# Patient Record
Sex: FEMALE | Race: WHITE | NOT HISPANIC OR LATINO | Employment: OTHER | URBAN - METROPOLITAN AREA
[De-identification: names, ages, dates, MRNs, and addresses within clinical notes are randomized per-mention and may not be internally consistent; named-entity substitution may affect disease eponyms.]

---

## 2023-02-24 LAB
ANION GAP IN SER/PLAS: 16 MMOL/L (ref 10–20)
CALCIUM (MG/DL) IN SER/PLAS: 9.4 MG/DL (ref 8.6–10.3)
CARBON DIOXIDE, TOTAL (MMOL/L) IN SER/PLAS: 27 MMOL/L (ref 21–32)
CHLORIDE (MMOL/L) IN SER/PLAS: 98 MMOL/L (ref 98–107)
CREATININE (MG/DL) IN SER/PLAS: 1.31 MG/DL (ref 0.5–1.05)
GFR FEMALE: 41 ML/MIN/1.73M2
GLUCOSE (MG/DL) IN SER/PLAS: 283 MG/DL (ref 74–99)
POTASSIUM (MMOL/L) IN SER/PLAS: 4.4 MMOL/L (ref 3.5–5.3)
SODIUM (MMOL/L) IN SER/PLAS: 137 MMOL/L (ref 136–145)
THYROTROPIN (MIU/L) IN SER/PLAS BY DETECTION LIMIT <= 0.05 MIU/L: 0.96 MIU/L (ref 0.44–3.98)
UREA NITROGEN (MG/DL) IN SER/PLAS: 24 MG/DL (ref 6–23)

## 2023-02-25 LAB
ESTIMATED AVERAGE GLUCOSE FOR HBA1C: 203 MG/DL
HEMOGLOBIN A1C/HEMOGLOBIN TOTAL IN BLOOD: 8.7 %

## 2023-02-28 LAB
THYROGLOBULIN AB (IU/ML) IN SER/PLAS: <0.9 IU/ML (ref 0–4)
THYROGLOBULIN LC-MS/MS: ABNORMAL NG/ML (ref 1.3–31.8)
THYROGLOBULIN: 0.2 NG/ML (ref 1.3–31.8)

## 2023-05-30 LAB
ANION GAP IN SER/PLAS: 16 MMOL/L (ref 10–20)
CALCIUM (MG/DL) IN SER/PLAS: 9.7 MG/DL (ref 8.6–10.3)
CARBON DIOXIDE, TOTAL (MMOL/L) IN SER/PLAS: 26 MMOL/L (ref 21–32)
CHLORIDE (MMOL/L) IN SER/PLAS: 101 MMOL/L (ref 98–107)
CHOLESTEROL (MG/DL) IN SER/PLAS: 134 MG/DL (ref 0–199)
CHOLESTEROL IN HDL (MG/DL) IN SER/PLAS: 46.1 MG/DL
CHOLESTEROL/HDL RATIO: 2.9
CREATININE (MG/DL) IN SER/PLAS: 1.27 MG/DL (ref 0.5–1.05)
ESTIMATED AVERAGE GLUCOSE FOR HBA1C: 160 MG/DL
GFR FEMALE: 42 ML/MIN/1.73M2
GLUCOSE (MG/DL) IN SER/PLAS: 188 MG/DL (ref 74–99)
HEMOGLOBIN A1C/HEMOGLOBIN TOTAL IN BLOOD: 7.2 %
LDL: 65 MG/DL (ref 0–99)
POTASSIUM (MMOL/L) IN SER/PLAS: 4.7 MMOL/L (ref 3.5–5.3)
SEDIMENTATION RATE, ERYTHROCYTE: 6 MM/H (ref 0–30)
SODIUM (MMOL/L) IN SER/PLAS: 138 MMOL/L (ref 136–145)
TRIGLYCERIDE (MG/DL) IN SER/PLAS: 116 MG/DL (ref 0–149)
UREA NITROGEN (MG/DL) IN SER/PLAS: 27 MG/DL (ref 6–23)
VLDL: 23 MG/DL (ref 0–40)

## 2023-05-31 ENCOUNTER — APPOINTMENT (OUTPATIENT)
Dept: PRIMARY CARE | Facility: CLINIC | Age: 83
End: 2023-05-31
Payer: MEDICARE

## 2023-06-12 LAB — URATE (MG/DL) IN SER/PLAS: 8.8 MG/DL (ref 2.3–6.7)

## 2023-06-21 ENCOUNTER — OFFICE VISIT (OUTPATIENT)
Dept: PRIMARY CARE | Facility: CLINIC | Age: 83
End: 2023-06-21
Payer: MEDICARE

## 2023-06-21 VITALS
HEIGHT: 68 IN | WEIGHT: 272 LBS | HEART RATE: 98 BPM | BODY MASS INDEX: 41.22 KG/M2 | RESPIRATION RATE: 16 BRPM | DIASTOLIC BLOOD PRESSURE: 68 MMHG | SYSTOLIC BLOOD PRESSURE: 124 MMHG | OXYGEN SATURATION: 96 %

## 2023-06-21 DIAGNOSIS — E78.5 HYPERLIPIDEMIA, UNSPECIFIED HYPERLIPIDEMIA TYPE: ICD-10-CM

## 2023-06-21 DIAGNOSIS — E11.9 DIABETES MELLITUS TYPE 2 WITHOUT RETINOPATHY (MULTI): ICD-10-CM

## 2023-06-21 DIAGNOSIS — E79.0 HYPERURICEMIA: ICD-10-CM

## 2023-06-21 DIAGNOSIS — I10 PRIMARY HYPERTENSION: Primary | ICD-10-CM

## 2023-06-21 DIAGNOSIS — I48.19 PERSISTENT ATRIAL FIBRILLATION (MULTI): ICD-10-CM

## 2023-06-21 DIAGNOSIS — E03.9 ACQUIRED HYPOTHYROIDISM: ICD-10-CM

## 2023-06-21 PROBLEM — H02.59 FLOPPY EYELID SYNDROME OF BOTH EYES: Status: ACTIVE | Noted: 2023-06-21

## 2023-06-21 PROBLEM — C73 THYROID CANCER (MULTI): Status: ACTIVE | Noted: 2023-06-21

## 2023-06-21 PROBLEM — K64.4 HEMORRHOIDS, EXTERNAL: Status: ACTIVE | Noted: 2023-06-21

## 2023-06-21 PROBLEM — R09.81 NASAL CONGESTION: Status: ACTIVE | Noted: 2023-06-21

## 2023-06-21 PROBLEM — H52.31 ANISOMETROPIA: Status: ACTIVE | Noted: 2023-06-21

## 2023-06-21 PROBLEM — K62.5 BLEEDING PER RECTUM: Status: ACTIVE | Noted: 2023-06-21

## 2023-06-21 PROBLEM — R06.09 DOE (DYSPNEA ON EXERTION): Status: ACTIVE | Noted: 2023-06-21

## 2023-06-21 PROBLEM — R11.0 DAILY NAUSEA: Status: ACTIVE | Noted: 2023-06-21

## 2023-06-21 PROBLEM — R14.0 ABDOMINAL BLOATING: Status: ACTIVE | Noted: 2023-06-21

## 2023-06-21 PROBLEM — G47.33 SLEEP APNEA, OBSTRUCTIVE: Status: ACTIVE | Noted: 2023-06-21

## 2023-06-21 PROBLEM — R53.1 WEAKNESS GENERALIZED: Status: ACTIVE | Noted: 2023-06-21

## 2023-06-21 PROBLEM — J44.9 CHRONIC OBSTRUCTIVE PULMONARY DISEASE (MULTI): Status: ACTIVE | Noted: 2023-06-21

## 2023-06-21 PROBLEM — H20.9 ANTERIOR UVEITIS: Status: ACTIVE | Noted: 2023-06-21

## 2023-06-21 PROBLEM — R60.9 EDEMA: Status: ACTIVE | Noted: 2023-06-21

## 2023-06-21 PROBLEM — I50.30 (HFPEF) HEART FAILURE WITH PRESERVED EJECTION FRACTION (MULTI): Status: ACTIVE | Noted: 2023-06-21

## 2023-06-21 PROBLEM — I48.91 ATRIAL FIBRILLATION (MULTI): Status: ACTIVE | Noted: 2023-06-21

## 2023-06-21 PROBLEM — K52.9 CHRONIC DIARRHEA OF UNKNOWN ORIGIN: Status: ACTIVE | Noted: 2023-06-21

## 2023-06-21 PROBLEM — H26.9 CATARACT OF RIGHT EYE: Status: ACTIVE | Noted: 2023-06-21

## 2023-06-21 PROBLEM — E66.01 SEVERE OBESITY (BMI >= 40) (MULTI): Status: ACTIVE | Noted: 2023-06-21

## 2023-06-21 PROBLEM — M25.569 JOINT PAIN, KNEE: Status: ACTIVE | Noted: 2023-06-21

## 2023-06-21 PROBLEM — R53.83 FATIGUE: Status: ACTIVE | Noted: 2023-06-21

## 2023-06-21 PROBLEM — I87.399 CHRONIC VENOUS HYPERTENSION WITH COMPLICATION: Status: ACTIVE | Noted: 2023-06-21

## 2023-06-21 PROBLEM — M19.90 ARTHRITIS: Status: ACTIVE | Noted: 2023-06-21

## 2023-06-21 PROBLEM — H18.20 CORNEAL EDEMA OF RIGHT EYE: Status: ACTIVE | Noted: 2023-06-21

## 2023-06-21 PROBLEM — R10.84 GENERALIZED ABDOMINAL PAIN: Status: ACTIVE | Noted: 2023-06-21

## 2023-06-21 PROBLEM — Z96.1 PSEUDOPHAKIA OF BOTH EYES: Status: ACTIVE | Noted: 2023-06-21

## 2023-06-21 PROBLEM — H57.04 PERSISTENT MYDRIASIS: Status: ACTIVE | Noted: 2023-06-21

## 2023-06-21 PROBLEM — R19.8 ALTERNATING CONSTIPATION AND DIARRHEA: Status: ACTIVE | Noted: 2023-06-21

## 2023-06-21 PROBLEM — K64.8 PROLAPSED INTERNAL HEMORRHOIDS: Status: ACTIVE | Noted: 2023-06-21

## 2023-06-21 PROCEDURE — 1159F MED LIST DOCD IN RCRD: CPT | Performed by: INTERNAL MEDICINE

## 2023-06-21 PROCEDURE — 3074F SYST BP LT 130 MM HG: CPT | Performed by: INTERNAL MEDICINE

## 2023-06-21 PROCEDURE — 99213 OFFICE O/P EST LOW 20 MIN: CPT | Performed by: INTERNAL MEDICINE

## 2023-06-21 PROCEDURE — 1036F TOBACCO NON-USER: CPT | Performed by: INTERNAL MEDICINE

## 2023-06-21 PROCEDURE — 3078F DIAST BP <80 MM HG: CPT | Performed by: INTERNAL MEDICINE

## 2023-06-21 RX ORDER — METFORMIN HYDROCHLORIDE 500 MG/1
3 TABLET, EXTENDED RELEASE ORAL DAILY
COMMUNITY
Start: 2013-06-03 | End: 2024-05-28 | Stop reason: SDUPTHER

## 2023-06-21 RX ORDER — BLOOD-GLUCOSE METER
EACH MISCELLANEOUS
COMMUNITY
Start: 2021-03-24 | End: 2024-05-28 | Stop reason: SDUPTHER

## 2023-06-21 RX ORDER — GLIMEPIRIDE 1 MG/1
1 TABLET ORAL DAILY
COMMUNITY
End: 2024-04-09 | Stop reason: SDUPTHER

## 2023-06-21 RX ORDER — SIMVASTATIN 40 MG/1
1 TABLET, FILM COATED ORAL DAILY
COMMUNITY
Start: 2012-10-08 | End: 2023-06-21 | Stop reason: SDUPTHER

## 2023-06-21 RX ORDER — SIMVASTATIN 40 MG/1
40 TABLET, FILM COATED ORAL DAILY
Qty: 90 TABLET | Refills: 3 | Status: SHIPPED | OUTPATIENT
Start: 2023-06-21

## 2023-06-21 RX ORDER — TORSEMIDE 20 MG/1
10 TABLET ORAL DAILY
COMMUNITY
End: 2024-04-30

## 2023-06-21 RX ORDER — LISINOPRIL 20 MG/1
1 TABLET ORAL DAILY
COMMUNITY

## 2023-06-21 RX ORDER — LEVOTHYROXINE SODIUM 150 UG/1
150 TABLET ORAL DAILY
COMMUNITY
Start: 2013-01-31 | End: 2023-09-19 | Stop reason: SDUPTHER

## 2023-06-21 RX ORDER — DICLOFENAC SODIUM 10 MG/G
GEL TOPICAL
COMMUNITY
Start: 2022-09-03 | End: 2023-08-01 | Stop reason: ALTCHOICE

## 2023-06-21 RX ORDER — ALLOPURINOL 100 MG/1
100 TABLET ORAL 2 TIMES DAILY
COMMUNITY
Start: 2023-06-16 | End: 2023-08-01 | Stop reason: ALTCHOICE

## 2023-06-21 RX ORDER — CITALOPRAM 20 MG/1
1 TABLET, FILM COATED ORAL DAILY
COMMUNITY
Start: 2018-03-14 | End: 2023-12-19 | Stop reason: SDUPTHER

## 2023-06-21 RX ORDER — VERAPAMIL HYDROCHLORIDE 240 MG/1
1 TABLET, FILM COATED, EXTENDED RELEASE ORAL DAILY
COMMUNITY
Start: 2012-07-09

## 2023-06-21 RX ORDER — CELECOXIB 200 MG/1
1 CAPSULE ORAL DAILY
COMMUNITY
Start: 2014-11-03 | End: 2023-10-03 | Stop reason: SDUPTHER

## 2023-06-21 RX ORDER — METOPROLOL SUCCINATE 50 MG/1
50 TABLET, EXTENDED RELEASE ORAL DAILY
COMMUNITY
Start: 2013-01-15

## 2023-06-21 RX ORDER — HYDRALAZINE HYDROCHLORIDE 50 MG/1
50 TABLET, FILM COATED ORAL 2 TIMES DAILY
COMMUNITY
End: 2023-06-21 | Stop reason: SDUPTHER

## 2023-06-21 RX ORDER — EXENATIDE 2 MG/.85ML
INJECTION, SUSPENSION, EXTENDED RELEASE SUBCUTANEOUS
COMMUNITY
Start: 2019-10-14 | End: 2023-10-17 | Stop reason: SDUPTHER

## 2023-06-21 RX ORDER — WARFARIN SODIUM 5 MG/1
TABLET ORAL
COMMUNITY
Start: 2019-04-06

## 2023-06-21 RX ORDER — PSYLLIUM SEED
PACKET (EA) ORAL
COMMUNITY

## 2023-06-21 RX ORDER — HYDRALAZINE HYDROCHLORIDE 50 MG/1
50 TABLET, FILM COATED ORAL 2 TIMES DAILY
Qty: 180 TABLET | Refills: 3 | Status: SHIPPED | OUTPATIENT
Start: 2023-06-21

## 2023-06-21 ASSESSMENT — ENCOUNTER SYMPTOMS
VOMITING: 0
CONSTIPATION: 0
NAUSEA: 0
DIARRHEA: 0
MYALGIAS: 0
CHILLS: 0
COUGH: 0
SHORTNESS OF BREATH: 0
DIAPHORESIS: 0
JOINT SWELLING: 0
FEVER: 0

## 2023-06-21 ASSESSMENT — PATIENT HEALTH QUESTIONNAIRE - PHQ9
2. FEELING DOWN, DEPRESSED OR HOPELESS: NOT AT ALL
1. LITTLE INTEREST OR PLEASURE IN DOING THINGS: NOT AT ALL
SUM OF ALL RESPONSES TO PHQ9 QUESTIONS 1 AND 2: 0

## 2023-06-21 NOTE — PATIENT INSTRUCTIONS
REFILLS ARE SENT IN FOR YOU    2.  PLEASE ASK CONGESTIVE HEART FAILURE DOCTOR WHY YOU STOPPED LISINOPRIL, SINCE THIS CAN HELP CONGESTIVE HEART, HELP SLOW KIDNEY ISSUES, AND PREVENT STROKES.    3.  LABS ARE ORDERED AND IN THE COMPUTER FOR BEFORE NEXT VISIT    4.  PLEASE CALL IF OTHER REFILLS ARE NEEDED    5.  I SUSPECT THAT YOUR TOE PAIN IS FROM HAMMERTOE MORE SO THAN GOUT, BUT WE WILL SEE FROM THE INTRA-OPERATIVE TEST WHETHER THERE IS GOUT IN THERE    6.  PLEASE TRY TO GET LAB FOR WARFARIN EVERY OTHER MONTH    7.  4 MONTH FOLLOW UP

## 2023-06-21 NOTE — PROGRESS NOTES
"Subjective   Promise Perry is a 83 y.o. female who presents for NEW PT ESTABLISH   POSSIBLE KNEE REPLACEMENT L KNEE AUGUST   DIABETIC    HX OF THYROID CANCER THYROID REMOVED YRS AGO   PT SAYS PREVIOUS PCP WOULD SEE HER EVERY 4 MONTHS WOULD ORDER LABS PRIOR TO APPT HAS COPY OF  LABS THAT WERE ORDERED   NEEDED ORDER FOR STANDING INR        HPI   DOCTOR RETIRED    NO ACUTE PROBLEMS    HAS TWO CARDIOLOGISTS, ONE CHF, ONE AFIB    DR. UMAÑA FOR CHF/CARD, DR. FRANCISCA LUCIA EP/AFIB, HAVE NO DEVICE    HAS HAD A TOE PROBLEM, VASCULAR DOCTOR DR. 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   Respiratory:  Negative for cough and shortness of breath.    Cardiovascular:  Negative for chest pain and leg swelling.   Gastrointestinal:  Negative for constipation, diarrhea, nausea and vomiting.   Musculoskeletal:  Negative for joint swelling and myalgias.        PER HPI       Objective   /68   Pulse 98   Resp 16   Ht 1.727 m (5' 8\")   Wt 123 kg (272 lb)   SpO2 96%   BMI 41.36 kg/m²     Physical Exam  Vitals reviewed.   Constitutional:       General: She is not in acute distress.     Appearance: She is obese. She is not ill-appearing.   Cardiovascular:      Rate and Rhythm: Normal rate and regular rhythm.      Pulses: Normal pulses.      Heart sounds:      No gallop.   Pulmonary:      Breath sounds: Normal breath sounds. No wheezing, rhonchi or rales.   Abdominal:      General: Abdomen is flat. Bowel sounds are normal.      Palpations: Abdomen is soft.      Tenderness: There is no guarding or rebound.   Musculoskeletal:      Right lower leg: No edema.      Left " lower leg: No edema.      Comments: 5TH TOE COOL TO TOUCH, RED, NO SWELLING, REPORTEDLY A HAMMER TOE?         Assessment/Plan   Problem List Items Addressed This Visit       Atrial fibrillation (CMS/[facility])    Relevant Medications    metoprolol succinate XL (Toprol-XL) 50 mg 24 hr tablet    verapamil SR (Calan-SR) 240 mg ER tablet    Other Relevant Orders    Protime-INR    Diabetes mellitus type 2 without retinopathy (CMS/[facility])    Relevant Orders    Hemoglobin A1C    Hypertension - Primary    Relevant Medications    hydrALAZINE (Apresoline) 50 mg tablet    Hypothyroidism    Relevant Orders    TSH with reflex to Free T4 if abnormal     Other Visit Diagnoses       Hyperlipidemia, unspecified hyperlipidemia type        Relevant Medications    simvastatin (Zocor) 40 mg tablet    Other Relevant Orders    Comprehensive Metabolic Panel    CBC    Hyperuricemia        Relevant Orders    Uric Acid          Patient Instructions    REFILLS ARE SENT IN FOR YOU    2.  PLEASE ASK CONGESTIVE HEART FAILURE DOCTOR WHY YOU STOPPED LISINOPRIL, SINCE THIS CAN HELP CONGESTIVE HEART, HELP SLOW KIDNEY ISSUES, AND PREVENT STROKES.    3.  LABS ARE ORDERED AND IN THE COMPUTER FOR BEFORE NEXT VISIT    4.  PLEASE CALL IF OTHER REFILLS ARE NEEDED    5.  I SUSPECT THAT YOUR TOE PAIN IS FROM HAMMERTOE MORE SO THAN GOUT, BUT WE WILL SEE FROM THE INTRA-OPERATIVE TEST WHETHER THERE IS GOUT IN THERE    6.  PLEASE TRY TO GET LAB FOR WARFARIN EVERY OTHER MONTH    7.  4 MONTH FOLLOW UP

## 2023-06-23 ENCOUNTER — HOSPITAL ENCOUNTER (OUTPATIENT)
Dept: DATA CONVERSION | Facility: HOSPITAL | Age: 83
End: 2023-06-23
Attending: INTERNAL MEDICINE | Admitting: INTERNAL MEDICINE
Payer: MEDICARE

## 2023-06-23 ENCOUNTER — TELEPHONE (OUTPATIENT)
Dept: PRIMARY CARE | Facility: CLINIC | Age: 83
End: 2023-06-23
Payer: MEDICARE

## 2023-06-23 DIAGNOSIS — N18.9 CHRONIC KIDNEY DISEASE, UNSPECIFIED: ICD-10-CM

## 2023-06-23 DIAGNOSIS — Z00.6 ENCOUNTER FOR EXAMINATION FOR NORMAL COMPARISON AND CONTROL IN CLINICAL RESEARCH PROGRAM: ICD-10-CM

## 2023-06-23 LAB
ANION GAP IN SER/PLAS: 14 MMOL/L (ref 10–20)
BASOPHILS (10*3/UL) IN BLOOD BY AUTOMATED COUNT: 0.06 X10E9/L (ref 0–0.1)
BASOPHILS/100 LEUKOCYTES IN BLOOD BY AUTOMATED COUNT: 1 % (ref 0–2)
CALCIUM (MG/DL) IN SER/PLAS: 9.5 MG/DL (ref 8.6–10.6)
CARBON DIOXIDE, TOTAL (MMOL/L) IN SER/PLAS: 27 MMOL/L (ref 21–32)
CHLORIDE (MMOL/L) IN SER/PLAS: 102 MMOL/L (ref 98–107)
CREATININE (MG/DL) IN SER/PLAS: 1.1 MG/DL (ref 0.5–1.05)
EOSINOPHILS (10*3/UL) IN BLOOD BY AUTOMATED COUNT: 0.12 X10E9/L (ref 0–0.4)
EOSINOPHILS/100 LEUKOCYTES IN BLOOD BY AUTOMATED COUNT: 1.9 % (ref 0–6)
ERYTHROCYTE DISTRIBUTION WIDTH (RATIO) BY AUTOMATED COUNT: 13.9 % (ref 11.5–14.5)
ERYTHROCYTE MEAN CORPUSCULAR HEMOGLOBIN CONCENTRATION (G/DL) BY AUTOMATED: 31.4 G/DL (ref 32–36)
ERYTHROCYTE MEAN CORPUSCULAR VOLUME (FL) BY AUTOMATED COUNT: 91 FL (ref 80–100)
ERYTHROCYTES (10*6/UL) IN BLOOD BY AUTOMATED COUNT: 4.23 X10E12/L (ref 4–5.2)
GFR FEMALE: 50 ML/MIN/1.73M2
GLUCOSE (MG/DL) IN SER/PLAS: 223 MG/DL (ref 74–99)
HEMATOCRIT (%) IN BLOOD BY AUTOMATED COUNT: 38.5 % (ref 36–46)
HEMOGLOBIN (G/DL) IN BLOOD: 12.1 G/DL (ref 12–16)
IMMATURE GRANULOCYTES/100 LEUKOCYTES IN BLOOD BY AUTOMATED COUNT: 0.3 % (ref 0–0.9)
LEUKOCYTES (10*3/UL) IN BLOOD BY AUTOMATED COUNT: 6.3 X10E9/L (ref 4.4–11.3)
LYMPHOCYTES (10*3/UL) IN BLOOD BY AUTOMATED COUNT: 0.81 X10E9/L (ref 0.8–3)
LYMPHOCYTES/100 LEUKOCYTES IN BLOOD BY AUTOMATED COUNT: 12.9 % (ref 13–44)
MONOCYTES (10*3/UL) IN BLOOD BY AUTOMATED COUNT: 0.48 X10E9/L (ref 0.05–0.8)
MONOCYTES/100 LEUKOCYTES IN BLOOD BY AUTOMATED COUNT: 7.7 % (ref 2–10)
NEUTROPHILS (10*3/UL) IN BLOOD BY AUTOMATED COUNT: 4.77 X10E9/L (ref 1.6–5.5)
NEUTROPHILS/100 LEUKOCYTES IN BLOOD BY AUTOMATED COUNT: 76.2 % (ref 40–80)
NRBC (PER 100 WBCS) BY AUTOMATED COUNT: 0 /100 WBC (ref 0–0)
PLATELETS (10*3/UL) IN BLOOD AUTOMATED COUNT: 210 X10E9/L (ref 150–450)
POTASSIUM (MMOL/L) IN SER/PLAS: 4.4 MMOL/L (ref 3.5–5.3)
SODIUM (MMOL/L) IN SER/PLAS: 139 MMOL/L (ref 136–145)
UREA NITROGEN (MG/DL) IN SER/PLAS: 23 MG/DL (ref 6–23)

## 2023-06-23 NOTE — TELEPHONE ENCOUNTER
Pt is a newer Pt of Dr. Patino.  He wanted her to find out when she went off Lisinopril and why.  Pt states she stopped the medication in January of 2022.  The reason is because she was experiencing low BP (97/62) and her doctor felt that medication was the least harmful to stop taking.  Pt also reports that she is currently participating in a study for  for chronic kidney disease.  They want her to have Dr. Patino order labs to check the protein loss in her urine.  If you have any questions or need additional information,  please call her. [phone]

## 2023-06-26 DIAGNOSIS — N18.31 STAGE 3A CHRONIC KIDNEY DISEASE (MULTI): Primary | ICD-10-CM

## 2023-07-21 ENCOUNTER — LAB (OUTPATIENT)
Dept: LAB | Facility: LAB | Age: 83
End: 2023-07-21
Payer: MEDICARE

## 2023-07-21 DIAGNOSIS — N18.31 STAGE 3A CHRONIC KIDNEY DISEASE (MULTI): ICD-10-CM

## 2023-07-21 DIAGNOSIS — I48.19 PERSISTENT ATRIAL FIBRILLATION (MULTI): ICD-10-CM

## 2023-07-21 LAB
INR IN PPP BY COAGULATION ASSAY: 2.3 (ref 0.9–1.1)
PROTHROMBIN TIME (PT) IN PPP BY COAGULATION ASSAY: 26.2 SEC (ref 9.8–12.8)

## 2023-07-21 PROCEDURE — 82570 ASSAY OF URINE CREATININE: CPT

## 2023-07-21 PROCEDURE — 85610 PROTHROMBIN TIME: CPT

## 2023-07-21 PROCEDURE — 36415 COLL VENOUS BLD VENIPUNCTURE: CPT

## 2023-07-21 PROCEDURE — 84156 ASSAY OF PROTEIN URINE: CPT

## 2023-07-22 LAB
CREATININE (MG/DL) IN URINE: 230 MG/DL (ref 20–320)
PROTEIN (MG/DL) IN URINE: 27 MG/DL (ref 5–24)
PROTEIN/CREATININE (MG/MG) IN URINE: 0.12 MG/MG CREAT (ref 0–0.17)

## 2023-08-01 ENCOUNTER — OFFICE VISIT (OUTPATIENT)
Dept: PRIMARY CARE | Facility: CLINIC | Age: 83
End: 2023-08-01
Payer: MEDICARE

## 2023-08-01 VITALS
OXYGEN SATURATION: 96 % | HEIGHT: 68 IN | RESPIRATION RATE: 16 BRPM | HEART RATE: 96 BPM | WEIGHT: 273 LBS | SYSTOLIC BLOOD PRESSURE: 130 MMHG | DIASTOLIC BLOOD PRESSURE: 74 MMHG | BODY MASS INDEX: 41.37 KG/M2

## 2023-08-01 DIAGNOSIS — E11.9 DIABETES MELLITUS TYPE 2 WITHOUT RETINOPATHY (MULTI): ICD-10-CM

## 2023-08-01 DIAGNOSIS — Z01.818 PRE-OPERATIVE EXAMINATION FOR INTERNAL MEDICINE: Primary | ICD-10-CM

## 2023-08-01 DIAGNOSIS — I10 PRIMARY HYPERTENSION: ICD-10-CM

## 2023-08-01 DIAGNOSIS — G47.33 SLEEP APNEA, OBSTRUCTIVE: ICD-10-CM

## 2023-08-01 DIAGNOSIS — J44.9 CHRONIC OBSTRUCTIVE PULMONARY DISEASE, UNSPECIFIED COPD TYPE (MULTI): ICD-10-CM

## 2023-08-01 DIAGNOSIS — I48.91 ATRIAL FIBRILLATION, UNSPECIFIED TYPE (MULTI): ICD-10-CM

## 2023-08-01 DIAGNOSIS — I50.30 HEART FAILURE WITH PRESERVED EJECTION FRACTION, UNSPECIFIED HF CHRONICITY (MULTI): ICD-10-CM

## 2023-08-01 DIAGNOSIS — M25.562 ARTHRALGIA OF LEFT KNEE: ICD-10-CM

## 2023-08-01 DIAGNOSIS — E66.01 SEVERE OBESITY (BMI >= 40) (MULTI): ICD-10-CM

## 2023-08-01 PROCEDURE — 1126F AMNT PAIN NOTED NONE PRSNT: CPT | Performed by: INTERNAL MEDICINE

## 2023-08-01 PROCEDURE — 3075F SYST BP GE 130 - 139MM HG: CPT | Performed by: INTERNAL MEDICINE

## 2023-08-01 PROCEDURE — 1036F TOBACCO NON-USER: CPT | Performed by: INTERNAL MEDICINE

## 2023-08-01 PROCEDURE — 1159F MED LIST DOCD IN RCRD: CPT | Performed by: INTERNAL MEDICINE

## 2023-08-01 PROCEDURE — 3078F DIAST BP <80 MM HG: CPT | Performed by: INTERNAL MEDICINE

## 2023-08-01 PROCEDURE — 99214 OFFICE O/P EST MOD 30 MIN: CPT | Performed by: INTERNAL MEDICINE

## 2023-08-01 RX ORDER — TRAMADOL HYDROCHLORIDE 50 MG/1
100 TABLET ORAL EVERY 8 HOURS PRN
Qty: 36 TABLET | Refills: 0 | Status: SHIPPED | OUTPATIENT
Start: 2023-08-01 | End: 2023-08-07

## 2023-08-01 ASSESSMENT — ENCOUNTER SYMPTOMS
DIARRHEA: 0
VOMITING: 0
FEVER: 0
NAUSEA: 0
COUGH: 0
MYALGIAS: 0
SHORTNESS OF BREATH: 0
CONSTIPATION: 0
CHILLS: 0
JOINT SWELLING: 0
DIAPHORESIS: 0

## 2023-08-01 NOTE — PATIENT INSTRUCTIONS
NEED TO TAKE LISINOPRIL, HYDRALAZINE, METOPROLOL, AND VERAPAMIL ON THE DAY OF SURGERY WITH A SIP OF WATER.  ALL OTHER MEDS CAN BE SKIPPED THAT DAY OF SURGERY    2.  STOP CELEBREX NOW IN PREPARATION FOR SURGERY.  A SMALL SCRIPT FOR TRAMADOL PAIN MEDICATION IS ORDERED FOR YOU IN THE EVENT CELEBREX DISCONTINUATION RESULTS IN UNACCEPTABLE PRE-OPERATIVE PAIN.    3.  I RECOMMEND THAT YOU RECEIVE A DUONEB NEBULIZER TREATMENT IN THE PRE-OP HOLDING AREA SO AS TO PREVENT ANY COPD/WHEEZING DURING THE INDUCTION OF ANESTHESIA    4.  DO NOT TAKE ANY GLIMEPIRIDE ON THE DAY OF SURGERY JUST TO BE CAREFUL.    5.  YOU ARE CONSIDERED A MODERATE RISK FOR COMPLICATION FROM THE PROPOSED ORTHOPEDIC SURGERY, BUT I AGREE WITH PROCEEDING IN ORDER TO RESTORE FULL AMBULATORY CAPACITY AND EXERCISE ABILITY.    6.  STOP COUMADIN 5 DAYS PRIOR TO SURGERY AND IT CAN BE RESUMED THE DAY AFTER SURGERY IF OK WITH THE SURGEON    7.  FOLLOW UP ROUTINELY POST OP OR AS NEEDED.    8.  PRE-ADMISSION TESTING TO BE DONE AT THE HOSPITAL, ASK IF THEY NEED AN UPDATED EKG, OR IF THE EKG DONE RECENTLY BY CARDIOLOGY WILL BE ADEQUATE.    **PATIENT IS A HIGHER RISK AIRWAY WITH LARGE NECK, OBESITY, AND KNOWN OBSTRUCTIVE SLEEP APNEA**

## 2023-08-01 NOTE — PROGRESS NOTES
"Subjective   Promise Perry is a 83 y.o. female who presents for med clearance L toal knee Dr. Conte 8/14   PAT on thurs      HPI   Dr. Cardiology DR. VARELA WHO WILL DO CARDIOLOGY CLEARANCE    HEART STATUS HAS BEEN STABLE, DENIES CHEST PAINS    SEE. DR. AGUILERA FOR AFIB    GET SHORT OF BREATH EASILY FROM COPD, AND ASTHMA    DURING COVID WAS GOING TO DOCTOR AT MAIN CAMP[facility] FOR PULMONARY    NOT USING INHALERS CURRENTLY, SOME WHEEZING AND SHORTNESS OF BREATH    PAT TO BE DONE ON THURSDAY.      Review of Systems   Constitutional:  Negative for chills, diaphoresis and fever.   Respiratory:  Negative for cough and shortness of breath.    Cardiovascular:  Negative for chest pain and leg swelling.   Gastrointestinal:  Negative for constipation, diarrhea, nausea and vomiting.   Musculoskeletal:  Negative for joint swelling and myalgias.       Objective   /74   Pulse 96   Resp 16   Ht 1.727 m (5' 8\")   Wt 124 kg (273 lb)   SpO2 96%   BMI 41.51 kg/m²     Physical Exam  Vitals reviewed.   Constitutional:       General: She is not in acute distress.     Appearance: She is obese. She is not ill-appearing.   Cardiovascular:      Rate and Rhythm: Normal rate and regular rhythm.      Pulses: Normal pulses.      Heart sounds:      No gallop.   Pulmonary:      Breath sounds: Normal breath sounds. No wheezing, rhonchi or rales.   Abdominal:      General: Abdomen is flat. Bowel sounds are normal.      Palpations: Abdomen is soft.      Tenderness: There is no guarding or rebound.   Musculoskeletal:      Right lower leg: No edema.      Left lower leg: No edema.         Assessment/Plan   Problem List Items Addressed This Visit       (HFpEF) heart failure with preserved ejection fraction (CMS/HCC)    Atrial fibrillation (CMS/HCC)    Chronic obstructive pulmonary disease (CMS/HCC)    Diabetes mellitus type 2 without retinopathy (CMS/HCC)    Hypertension    Joint pain, knee    Relevant Medications    traMADol (Ultram) 50 mg tablet "    Severe obesity (BMI >= 40) (CMS/[facility])    Sleep apnea, obstructive    Pre-operative examination for internal medicine - Primary     Patient Instructions    NEED TO TAKE LISINOPRIL, HYDRALAZINE, METOPROLOL, AND VERAPAMIL ON THE DAY OF SURGERY WITH A SIP OF WATER.  ALL OTHER MEDS CAN BE SKIPPED THAT DAY OF SURGERY    2.  STOP CELEBREX NOW IN PREPARATION FOR SURGERY.  A SMALL SCRIPT FOR TRAMADOL PAIN MEDICATION IS ORDERED FOR YOU IN THE EVENT CELEBREX DISCONTINUATION RESULTS IN UNACCEPTABLE PRE-OPERATIVE PAIN.    3.  I RECOMMEND THAT YOU RECEIVE A DUONEB NEBULIZER TREATMENT IN THE PRE-OP HOLDING AREA SO AS TO PREVENT ANY COPD/WHEEZING DURING THE INDUCTION OF ANESTHESIA    4.  DO NOT TAKE ANY GLIMEPIRIDE ON THE DAY OF SURGERY JUST TO BE CAREFUL.    5.  YOU ARE CONSIDERED A MODERATE RISK FOR COMPLICATION FROM THE PROPOSED ORTHOPEDIC SURGERY, BUT I AGREE WITH PROCEEDING IN ORDER TO RESTORE FULL AMBULATORY CAPACITY AND EXERCISE ABILITY.    6.  STOP COUMADIN 5 DAYS PRIOR TO SURGERY AND IT CAN BE RESUMED THE DAY AFTER SURGERY IF OK WITH THE SURGEON    7.  FOLLOW UP ROUTINELY POST OP OR AS NEEDED.    8.  PRE-ADMISSION TESTING TO BE DONE AT THE HOSPITAL, ASK IF THEY NEED AN UPDATED EKG, OR IF THE EKG DONE RECENTLY BY CARDIOLOGY WILL BE ADEQUATE.    **PATIENT IS A HIGHER RISK AIRWAY WITH LARGE NECK, OBESITY, AND KNOWN OBSTRUCTIVE SLEEP APNEA**

## 2023-08-03 LAB
ACTIVATED PARTIAL THROMBOPLASTIN TIME IN PPP BY COAGULATION ASSAY: 31 SEC (ref 27–38)
ALANINE AMINOTRANSFERASE (SGPT) (U/L) IN SER/PLAS: 11 U/L (ref 7–45)
ALBUMIN (G/DL) IN SER/PLAS: 4.2 G/DL (ref 3.4–5)
ALKALINE PHOSPHATASE (U/L) IN SER/PLAS: 64 U/L (ref 33–136)
ANION GAP IN SER/PLAS: 13 MMOL/L (ref 10–20)
APPEARANCE, URINE: ABNORMAL
ASPARTATE AMINOTRANSFERASE (SGOT) (U/L) IN SER/PLAS: 13 U/L (ref 9–39)
BASOPHILS (10*3/UL) IN BLOOD BY AUTOMATED COUNT: 0.05 X10E9/L (ref 0–0.1)
BASOPHILS/100 LEUKOCYTES IN BLOOD BY AUTOMATED COUNT: 0.6 % (ref 0–2)
BILIRUBIN TOTAL (MG/DL) IN SER/PLAS: 0.7 MG/DL (ref 0–1.2)
BILIRUBIN, URINE: NEGATIVE
BLOOD, URINE: NEGATIVE
CALCIUM (MG/DL) IN SER/PLAS: 9.6 MG/DL (ref 8.6–10.3)
CARBON DIOXIDE, TOTAL (MMOL/L) IN SER/PLAS: 29 MMOL/L (ref 21–32)
CHLORIDE (MMOL/L) IN SER/PLAS: 99 MMOL/L (ref 98–107)
COLOR, URINE: ABNORMAL
CREATININE (MG/DL) IN SER/PLAS: 1.32 MG/DL (ref 0.5–1.05)
EOSINOPHILS (10*3/UL) IN BLOOD BY AUTOMATED COUNT: 0.18 X10E9/L (ref 0–0.4)
EOSINOPHILS/100 LEUKOCYTES IN BLOOD BY AUTOMATED COUNT: 2.1 % (ref 0–6)
ERYTHROCYTE DISTRIBUTION WIDTH (RATIO) BY AUTOMATED COUNT: 13.9 % (ref 11.5–14.5)
ERYTHROCYTE MEAN CORPUSCULAR HEMOGLOBIN CONCENTRATION (G/DL) BY AUTOMATED: 31.4 G/DL (ref 32–36)
ERYTHROCYTE MEAN CORPUSCULAR VOLUME (FL) BY AUTOMATED COUNT: 91 FL (ref 80–100)
ERYTHROCYTES (10*6/UL) IN BLOOD BY AUTOMATED COUNT: 4.39 X10E12/L (ref 4–5.2)
ESTIMATED AVERAGE GLUCOSE FOR HBA1C: 157 MG/DL
GFR FEMALE: 40 ML/MIN/1.73M2
GLUCOSE (MG/DL) IN SER/PLAS: 216 MG/DL (ref 74–99)
GLUCOSE, URINE: NEGATIVE MG/DL
HEMATOCRIT (%) IN BLOOD BY AUTOMATED COUNT: 39.8 % (ref 36–46)
HEMOGLOBIN (G/DL) IN BLOOD: 12.5 G/DL (ref 12–16)
HEMOGLOBIN A1C/HEMOGLOBIN TOTAL IN BLOOD: 7.1 %
HYALINE CASTS, URINE: ABNORMAL /LPF
IMMATURE GRANULOCYTES/100 LEUKOCYTES IN BLOOD BY AUTOMATED COUNT: 0.5 % (ref 0–0.9)
INR IN PPP BY COAGULATION ASSAY: 1.6 (ref 0.9–1.1)
KETONES, URINE: NEGATIVE MG/DL
LEUKOCYTE ESTERASE, URINE: ABNORMAL
LEUKOCYTES (10*3/UL) IN BLOOD BY AUTOMATED COUNT: 8.5 X10E9/L (ref 4.4–11.3)
LYMPHOCYTES (10*3/UL) IN BLOOD BY AUTOMATED COUNT: 1.35 X10E9/L (ref 0.8–3)
LYMPHOCYTES/100 LEUKOCYTES IN BLOOD BY AUTOMATED COUNT: 16 % (ref 13–44)
MONOCYTES (10*3/UL) IN BLOOD BY AUTOMATED COUNT: 0.75 X10E9/L (ref 0.05–0.8)
MONOCYTES/100 LEUKOCYTES IN BLOOD BY AUTOMATED COUNT: 8.9 % (ref 2–10)
NEUTROPHILS (10*3/UL) IN BLOOD BY AUTOMATED COUNT: 6.09 X10E9/L (ref 1.6–5.5)
NEUTROPHILS/100 LEUKOCYTES IN BLOOD BY AUTOMATED COUNT: 71.9 % (ref 40–80)
NITRITE, URINE: NEGATIVE
NRBC (PER 100 WBCS) BY AUTOMATED COUNT: 0 /100 WBC (ref 0–0)
PH, URINE: 5 (ref 5–8)
PLATELETS (10*3/UL) IN BLOOD AUTOMATED COUNT: 228 X10E9/L (ref 150–450)
POTASSIUM (MMOL/L) IN SER/PLAS: 5 MMOL/L (ref 3.5–5.3)
PROTEIN TOTAL: 6.9 G/DL (ref 6.4–8.2)
PROTEIN, URINE: ABNORMAL MG/DL
PROTHROMBIN TIME (PT) IN PPP BY COAGULATION ASSAY: 18.2 SEC (ref 9.8–12.8)
RBC, URINE: ABNORMAL /HPF (ref 0–5)
SODIUM (MMOL/L) IN SER/PLAS: 136 MMOL/L (ref 136–145)
SPECIFIC GRAVITY, URINE: 1.02 (ref 1–1.03)
SQUAMOUS EPITHELIAL CELLS, URINE: 10 /HPF
UREA NITROGEN (MG/DL) IN SER/PLAS: 27 MG/DL (ref 6–23)
UROBILINOGEN, URINE: <2 MG/DL (ref 0–1.9)
WBC, URINE: ABNORMAL /HPF (ref 0–5)

## 2023-08-06 LAB — URINE CULTURE: NORMAL

## 2023-08-18 ENCOUNTER — NURSING HOME VISIT (OUTPATIENT)
Dept: POST ACUTE CARE | Facility: EXTERNAL LOCATION | Age: 83
End: 2023-08-18
Payer: MEDICARE

## 2023-08-18 DIAGNOSIS — D62 ANEMIA DUE TO ACUTE BLOOD LOSS: ICD-10-CM

## 2023-08-18 DIAGNOSIS — I48.91 ATRIAL FIBRILLATION, UNSPECIFIED TYPE (MULTI): Primary | ICD-10-CM

## 2023-08-18 DIAGNOSIS — G47.33 SLEEP APNEA, OBSTRUCTIVE: ICD-10-CM

## 2023-08-18 DIAGNOSIS — Z96.652 HISTORY OF TOTAL KNEE REPLACEMENT, LEFT: ICD-10-CM

## 2023-08-18 PROBLEM — R14.0 ABDOMINAL BLOATING: Status: RESOLVED | Noted: 2023-06-21 | Resolved: 2023-08-18

## 2023-08-18 PROBLEM — K64.4 HEMORRHOIDS, EXTERNAL: Status: RESOLVED | Noted: 2023-06-21 | Resolved: 2023-08-18

## 2023-08-18 PROBLEM — K62.5 BLEEDING PER RECTUM: Status: RESOLVED | Noted: 2023-06-21 | Resolved: 2023-08-18

## 2023-08-18 PROBLEM — R53.83 FATIGUE: Status: RESOLVED | Noted: 2023-06-21 | Resolved: 2023-08-18

## 2023-08-18 PROBLEM — R11.0 DAILY NAUSEA: Status: RESOLVED | Noted: 2023-06-21 | Resolved: 2023-08-18

## 2023-08-18 PROBLEM — R06.09 DOE (DYSPNEA ON EXERTION): Status: RESOLVED | Noted: 2023-06-21 | Resolved: 2023-08-18

## 2023-08-18 PROBLEM — M25.569 JOINT PAIN, KNEE: Status: RESOLVED | Noted: 2023-06-21 | Resolved: 2023-08-18

## 2023-08-18 PROBLEM — R19.8 ALTERNATING CONSTIPATION AND DIARRHEA: Status: RESOLVED | Noted: 2023-06-21 | Resolved: 2023-08-18

## 2023-08-18 PROBLEM — Z01.818 PRE-OPERATIVE EXAMINATION FOR INTERNAL MEDICINE: Status: RESOLVED | Noted: 2023-08-01 | Resolved: 2023-08-18

## 2023-08-18 PROBLEM — R09.81 NASAL CONGESTION: Status: RESOLVED | Noted: 2023-06-21 | Resolved: 2023-08-18

## 2023-08-18 PROBLEM — R10.84 GENERALIZED ABDOMINAL PAIN: Status: RESOLVED | Noted: 2023-06-21 | Resolved: 2023-08-18

## 2023-08-18 PROBLEM — R60.9 EDEMA: Status: RESOLVED | Noted: 2023-06-21 | Resolved: 2023-08-18

## 2023-08-18 PROBLEM — R53.1 WEAKNESS GENERALIZED: Status: RESOLVED | Noted: 2023-06-21 | Resolved: 2023-08-18

## 2023-08-18 PROCEDURE — 99306 1ST NF CARE HIGH MDM 50: CPT | Performed by: FAMILY MEDICINE

## 2023-08-18 NOTE — LETTER
Patient: Promise Perry  : 1940    Encounter Date: 2023    Visit  Note   Subjective  Promise Perry is a 83 y.o. female who is being seen and evaluated for multiple medical problems. Nursing notes, vital signs, and labs were reviewed in the local facility chart.    HPI this 33-year-old female underwent an elective left total knee replacement on 2023.  There were no complications other than some minor blood loss during the procedure.  Her warfarin was placed on hold and she was put on a Lovenox bridge.  She comes to this facility on 2023 for PT and OT  Objective  There were no vitals taken for this visit.  Physical Exam  Constitutional:       Appearance: Normal appearance.   HENT:      Head: Normocephalic.   Eyes:      Conjunctiva/sclera: Conjunctivae normal.   Cardiovascular:      Rate and Rhythm: Normal rate and regular rhythm.   Pulmonary:      Effort: Pulmonary effort is normal.      Breath sounds: Normal breath sounds.   Musculoskeletal:      Cervical back: Neck supple.      Comments: The left knee incision is covered with a dressing but there is no seepage on the dressing and the surrounding area is pink without bruising or erythema.   Skin:     General: Skin is warm and dry.   Neurological:      Mental Status: She is alert.       Assessment/Plan  This 83-year-old female with progressive severe degenerative osteoarthritis underwent an elective total knee replacement on 2023 by the orthopedic surgeon Dr. Bond.  There were no complications during the surgery.  Her warfarin was held and she has a Lovenox bridge which we will continue until her INR comes back up to 2.0.  We will be checking this daily.  We will follow-up with a CBC in 1 week to ensure stability of her blood counts.  Her wound dressing will be removed per orthopedist orders on 2023 and she will follow-up with her surgeon in 2 weeks for suture removal.  She is weightbearing as tolerated.  She received PT and OT  while here and plans to return home where she lives independently.  Pain is currently being well managed with intermittent oxycodone  Problem List Items Addressed This Visit       Atrial fibrillation (CMS/HCC) - Primary     Rate is currently controlled and she is euvolemic.  She is on a Lovenox bridge while we are resuming her warfarin.  We will continue the Lovenox until her warfarin which is an INR of 2.0 and then will be discontinued.           Sleep apnea, obstructive     She is currently managed with CPAP which we will continue while she is here         History of total knee replacement, left          Electronically Signed By: Nick Barclay MD   8/18/23  5:43 PM

## 2023-08-18 NOTE — ASSESSMENT & PLAN NOTE
This has been therapeutic with Synthroid supplementation.  Her most recent TSH was therapeutic 3 months ago

## 2023-08-18 NOTE — ASSESSMENT & PLAN NOTE
Rate is currently controlled and she is euvolemic.  She is on a Lovenox bridge while we are resuming her warfarin.  We will continue the Lovenox until her warfarin which is an INR of 2.0 and then will be discontinued.

## 2023-08-18 NOTE — PROGRESS NOTES
Visit  Note   Subjective   Promise Perry is a 83 y.o. female who is being seen and evaluated for multiple medical problems. Nursing notes, vital signs, and labs were reviewed in the local facility chart.    HPI this 33-year-old female underwent an elective left total knee replacement on 8/14/2023.  There were no complications other than some minor blood loss during the procedure.  Her warfarin was placed on hold and she was put on a Lovenox bridge.  She comes to this facility on 8/18/2023 for PT and OT  Objective   There were no vitals taken for this visit.  Physical Exam  Constitutional:       Appearance: Normal appearance.   HENT:      Head: Normocephalic.   Eyes:      Conjunctiva/sclera: Conjunctivae normal.   Cardiovascular:      Rate and Rhythm: Normal rate and regular rhythm.   Pulmonary:      Effort: Pulmonary effort is normal.      Breath sounds: Normal breath sounds.   Musculoskeletal:      Cervical back: Neck supple.      Comments: The left knee incision is covered with a dressing but there is no seepage on the dressing and the surrounding area is pink without bruising or erythema.   Skin:     General: Skin is warm and dry.   Neurological:      Mental Status: She is alert.       Assessment/Plan   This 83-year-old female with progressive severe degenerative osteoarthritis underwent an elective total knee replacement on 8/14/2023 by the orthopedic surgeon Dr. Bond.  There were no complications during the surgery.  Her warfarin was held and she has a Lovenox bridge which we will continue until her INR comes back up to 2.0.  We will be checking this daily.  We will follow-up with a CBC in 1 week to ensure stability of her blood counts.  Her wound dressing will be removed per orthopedist orders on 8/20/2023 and she will follow-up with her surgeon in 2 weeks for suture removal.  She is weightbearing as tolerated.  She received PT and OT while here and plans to return home where she lives independently.  Pain  is currently being well managed with intermittent oxycodone  Problem List Items Addressed This Visit       Atrial fibrillation (CMS/HCC) - Primary     Rate is currently controlled and she is euvolemic.  She is on a Lovenox bridge while we are resuming her warfarin.  We will continue the Lovenox until her warfarin which is an INR of 2.0 and then will be discontinued.           Sleep apnea, obstructive     She is currently managed with CPAP which we will continue while she is here         History of total knee replacement, left

## 2023-08-18 NOTE — ASSESSMENT & PLAN NOTE
This is controlled with Bydureon, glimepiride, metformin.  She will be covered with sliding scale insulin during the acute recovery.  This has been well controlled with a recent A1c of 7.1% earlier this month

## 2023-08-18 NOTE — ASSESSMENT & PLAN NOTE
Hemoglobin dropped to 10.5 at the time of discharge which is a mild decrease.  We will check this again in 1 week to ensure it does not continue to drop

## 2023-09-05 RX ORDER — POLYETHYLENE GLYCOL 3350 17 G/17G
17 POWDER, FOR SOLUTION ORAL 2 TIMES DAILY
COMMUNITY
Start: 2023-08-16

## 2023-09-05 RX ORDER — DOCUSATE SODIUM 100 MG/1
CAPSULE, LIQUID FILLED ORAL 2 TIMES DAILY
COMMUNITY
Start: 2023-08-16

## 2023-09-05 RX ORDER — REPAGLINIDE 1 MG/1
TABLET ORAL
COMMUNITY
Start: 2022-12-19 | End: 2023-09-19 | Stop reason: ALTCHOICE

## 2023-09-05 RX ORDER — METHYLPREDNISOLONE 4 MG/1
TABLET ORAL
COMMUNITY
Start: 2023-02-07 | End: 2023-09-19 | Stop reason: ALTCHOICE

## 2023-09-05 RX ORDER — INSULIN LISPRO 100 [IU]/ML
INJECTION, SOLUTION INTRAVENOUS; SUBCUTANEOUS
COMMUNITY
Start: 2023-08-16 | End: 2023-09-19 | Stop reason: ALTCHOICE

## 2023-09-05 RX ORDER — ALLOPURINOL 100 MG/1
1 TABLET ORAL 2 TIMES DAILY
COMMUNITY
Start: 2023-06-13 | End: 2023-11-02 | Stop reason: SDUPTHER

## 2023-09-05 RX ORDER — ACETAMINOPHEN 325 MG/1
TABLET ORAL EVERY 6 HOURS PRN
COMMUNITY
Start: 2019-09-18

## 2023-09-05 RX ORDER — CYCLOBENZAPRINE HCL 10 MG
10 TABLET ORAL 3 TIMES DAILY PRN
COMMUNITY
Start: 2023-08-16 | End: 2023-11-07 | Stop reason: ALTCHOICE

## 2023-09-05 RX ORDER — CHLORHEXIDINE GLUCONATE ORAL RINSE 1.2 MG/ML
SOLUTION DENTAL
COMMUNITY
Start: 2023-07-31 | End: 2023-10-11 | Stop reason: ALTCHOICE

## 2023-09-05 RX ORDER — COVID-19 MOLECULAR TEST ASSAY
KIT MISCELLANEOUS
COMMUNITY
Start: 2022-10-04 | End: 2023-10-11 | Stop reason: ALTCHOICE

## 2023-09-05 RX ORDER — NIRMATRELVIR AND RITONAVIR 150-100 MG
KIT ORAL
COMMUNITY
Start: 2022-10-04 | End: 2023-09-19 | Stop reason: ALTCHOICE

## 2023-09-18 ENCOUNTER — PATIENT OUTREACH (OUTPATIENT)
Dept: CARE COORDINATION | Facility: CLINIC | Age: 83
End: 2023-09-18
Payer: MEDICARE

## 2023-09-18 ENCOUNTER — DOCUMENTATION (OUTPATIENT)
Dept: PRIMARY CARE | Facility: CLINIC | Age: 83
End: 2023-09-18
Payer: MEDICARE

## 2023-09-18 NOTE — PROGRESS NOTES
Discharge Facility: [facility]  Discharge Diagnosis: Status post knee replacement  Admission Date: 8/18/2023  Discharge Date: 9/14/2023    Pt was admitted to [facility] 8/15-8/18/2023    PCP Appointment Date: 9/19/2023 1420    Specialist Appointment Date:   -Cardiology 10/11/2023 1040  -Orthopedics 10/12/2023 1115    Hospital Encounter and Summary: Linked     See discharge assessment below for further details    Engagement  Call Start Time: 1027 (9/18/2023 10:28 AM)    Medications  Medications reviewed with patient/caregiver?: Yes (new prescription reviewed; flexeril) (9/18/2023 10:28 AM)  Is the patient having any side effects they believe may be caused by any medication additions or changes?: No (9/18/2023 10:28 AM)  Does the patient have all medications ordered at discharge?: Yes (9/18/2023 10:28 AM)  Care Management Interventions: No intervention needed (9/18/2023 10:28 AM)  Is the patient taking all medications as directed (includes completed medication regime)?: Yes (9/18/2023 10:28 AM)    Appointments  Does the patient have a primary care provider?: Yes (9/18/2023 10:28 AM)  Care Management Interventions: Verified appointment date/time/provider (9/18/2023 10:28 AM)  Has the patient kept scheduled appointments due by today?: Yes (9/18/2023 10:28 AM)    Self Management  Has home health visited the patient within 72 hours of discharge?: Not applicable (9/18/2023 10:28 AM)    Patient Teaching  Does the patient have access to their discharge instructions?: Yes (9/18/2023 10:28 AM)  What is the patient's perception of their health status since discharge?: Improving (9/18/2023 10:28 AM)  Is the patient/caregiver able to teach back the hierarchy of who to call/visit for symptoms/problems? PCP, Specialist, Home Health nurse, Urgent Care, ED, 911: Yes (9/18/2023 10:28 AM)    Wrap Up  Wrap Up Additional Comments: Pt was admitted to [facility] 8/15-8/18 after having a left total knee arthroplasty on 8/14.  Pt was then discharged to [facility]. Pt discharged from SNF 9/14 back home. Pt reports feeling better since being home. Pt states she is set up to go to outpatient physical therapy at [facility]. Pt reports no questions regarding discharge instructions. Pt discharged with oral flexeril and reports adequate pain control. Pt states she is able to get around well without assistance. Pt scheduled for PCP follow up tomorrow 9/19 1420. Pt encouraged to call if questions or needs arise. (9/18/2023 10:28 AM)  Call End Time: 1035 (9/18/2023 10:28 AM)

## 2023-09-19 ENCOUNTER — OFFICE VISIT (OUTPATIENT)
Dept: PRIMARY CARE | Facility: CLINIC | Age: 83
End: 2023-09-19
Payer: MEDICARE

## 2023-09-19 VITALS
SYSTOLIC BLOOD PRESSURE: 128 MMHG | BODY MASS INDEX: 41.63 KG/M2 | HEART RATE: 86 BPM | DIASTOLIC BLOOD PRESSURE: 74 MMHG | RESPIRATION RATE: 16 BRPM | WEIGHT: 273.8 LBS

## 2023-09-19 DIAGNOSIS — I50.30 HEART FAILURE WITH PRESERVED EJECTION FRACTION, UNSPECIFIED HF CHRONICITY (MULTI): ICD-10-CM

## 2023-09-19 DIAGNOSIS — I48.91 ATRIAL FIBRILLATION, UNSPECIFIED TYPE (MULTI): ICD-10-CM

## 2023-09-19 DIAGNOSIS — E03.9 HYPOTHYROIDISM, UNSPECIFIED TYPE: ICD-10-CM

## 2023-09-19 DIAGNOSIS — I10 PRIMARY HYPERTENSION: ICD-10-CM

## 2023-09-19 DIAGNOSIS — J44.9 CHRONIC OBSTRUCTIVE PULMONARY DISEASE, UNSPECIFIED COPD TYPE (MULTI): ICD-10-CM

## 2023-09-19 DIAGNOSIS — Z96.652 HISTORY OF TOTAL KNEE REPLACEMENT, LEFT: Primary | ICD-10-CM

## 2023-09-19 DIAGNOSIS — E66.01 SEVERE OBESITY (BMI >= 40) (MULTI): ICD-10-CM

## 2023-09-19 PROCEDURE — 1159F MED LIST DOCD IN RCRD: CPT | Performed by: INTERNAL MEDICINE

## 2023-09-19 PROCEDURE — 3078F DIAST BP <80 MM HG: CPT | Performed by: INTERNAL MEDICINE

## 2023-09-19 PROCEDURE — 1125F AMNT PAIN NOTED PAIN PRSNT: CPT | Performed by: INTERNAL MEDICINE

## 2023-09-19 PROCEDURE — 1036F TOBACCO NON-USER: CPT | Performed by: INTERNAL MEDICINE

## 2023-09-19 PROCEDURE — 3074F SYST BP LT 130 MM HG: CPT | Performed by: INTERNAL MEDICINE

## 2023-09-19 PROCEDURE — 99496 TRANSJ CARE MGMT HIGH F2F 7D: CPT | Performed by: INTERNAL MEDICINE

## 2023-09-19 RX ORDER — LEVOTHYROXINE SODIUM 150 UG/1
150 TABLET ORAL DAILY
Qty: 90 TABLET | Refills: 3 | Status: SHIPPED | OUTPATIENT
Start: 2023-09-19 | End: 2024-09-18

## 2023-09-19 NOTE — PATIENT INSTRUCTIONS
REFILL ON THYROID MEDICATION SENT TO [facility]    2.  PLEASE CALL IF YOU NEED REFILLS    3.  FOLLOW UP FOR THE MEDICARE ANNUAL AS PLANNED    4.  YOU APPEAR TO BE RECOVERING VERY WELL FROM SURGERY    5.  THE CARDIAC DANGERS OF CYCLOBENZAPRINE/FLEXERIL ARE GREATEST IN THOSE WHO TAKE IT REGULARLY AND FOR LONG PERIODS.  INTERMITTENT ADMINISTRATION IS UNLIKELY TO RESULT IN ANY SIGNIFICANT CARDIAC CONDUCTION DISTURBANCE

## 2023-09-19 NOTE — PROGRESS NOTES
Subjective   Promise Perry is a 83 y.o. female who presents for Hospital Follow-up (RT KNEE REPLACEMENT, PT ALSO CONCERNED DUE TO PHARMACIST'S WARNING ABOUT TAKING CYCLOBENZAPRINE DUE TO HEART CONDITIONS).    HPI   Take occasional flexeril, PHARMACIST WAS CONCERNED DUE TO CARDIAC HISTORY    SHE HAS BEEN TAKING AS NEEDED FLEXERIL FOR AWHILE RARELY FOR LEG SPASM    HAS AFIB    CHECKING WITH CARDIOLOGIST'S PHARMACIST     GLUCOSES BEEN OK OVERALL.     COUMADIN MONITORED MONTHLY WHEN ABLE MONTHLY  Review of Systems   Constitutional:  Negative for chills, diaphoresis and fever.   Respiratory:  Negative for cough and shortness of breath.    Cardiovascular:  Negative for chest pain and leg swelling.   Gastrointestinal:  Negative for constipation, diarrhea, nausea and vomiting.   Musculoskeletal:  Negative for joint swelling and myalgias.       Objective   /74   Pulse 86   Resp 16   Wt 124 kg (273 lb 12.8 oz)   BMI 41.63 kg/m²     Physical Exam  Vitals reviewed.   Constitutional:       General: She is not in acute distress.     Appearance: She is obese. She is not ill-appearing.   Cardiovascular:      Rate and Rhythm: Normal rate and regular rhythm.      Pulses: Normal pulses.      Heart sounds:      No gallop.   Pulmonary:      Breath sounds: Normal breath sounds. No wheezing, rhonchi or rales.   Abdominal:      General: Abdomen is flat. Bowel sounds are normal.      Palpations: Abdomen is soft.      Tenderness: There is no guarding or rebound.   Musculoskeletal:      Right lower leg: No edema.      Left lower leg: No edema.      Comments: Left arthroplasty incision is closed and dry       Patient Instructions    REFILL ON THYROID MEDICATION SENT TO [facility]    2.  PLEASE CALL IF YOU NEED REFILLS    3.  FOLLOW UP FOR THE MEDICARE ANNUAL AS PLANNED    4.  YOU APPEAR TO BE RECOVERING VERY WELL FROM SURGERY    5.  THE CARDIAC DANGERS OF CYCLOBENZAPRINE/FLEXERIL ARE GREATEST IN THOSE WHO TAKE IT REGULARLY AND FOR LONG  PERIODS.  INTERMITTENT ADMINISTRATION IS UNLIKELY TO RESULT IN ANY SIGNIFICANT CARDIAC CONDUCTION DISTURBANCE        Assessment/Plan   Problem List Items Addressed This Visit       (HFpEF) heart failure with preserved ejection fraction (CMS/HCC)    Atrial fibrillation (CMS/HCC)    Chronic obstructive pulmonary disease (CMS/HCC)    Hypertension    Hypothyroidism - Primary    Relevant Medications    levothyroxine (Synthroid, Levoxyl) 150 mcg tablet    Severe obesity (BMI >= 40) (CMS/HCC)    History of total knee replacement, left

## 2023-09-20 ENCOUNTER — TELEPHONE (OUTPATIENT)
Dept: PRIMARY CARE | Facility: CLINIC | Age: 83
End: 2023-09-20
Payer: MEDICARE

## 2023-09-22 ENCOUNTER — PATIENT OUTREACH (OUTPATIENT)
Dept: CARE COORDINATION | Facility: CLINIC | Age: 83
End: 2023-09-22
Payer: MEDICARE

## 2023-09-22 ASSESSMENT — ENCOUNTER SYMPTOMS
JOINT SWELLING: 0
MYALGIAS: 0
VOMITING: 0
COUGH: 0
CONSTIPATION: 0
SHORTNESS OF BREATH: 0
DIAPHORESIS: 0
CHILLS: 0
NAUSEA: 0
FEVER: 0
DIARRHEA: 0

## 2023-09-22 NOTE — PROGRESS NOTES
Call regarding appt. with PCP on 9/19/2023 after hospitalization.  At time of outreach call the patient feels as if their condition has improved since last visit.  Reviewed the PCP appointment with the pt and addressed any questions or concerns.  Pt reports she is back to work and working from home for a couple weeks. Pt states she is getting around the house better and that she is able to do everything she needs to do. Pt is going to outpt therapy three times a week.  Pt denies any questions, needs, or concerns at this time. Pt encouraged to call if questions arise.

## 2023-10-03 DIAGNOSIS — M19.90 ARTHRITIS: ICD-10-CM

## 2023-10-03 RX ORDER — CELECOXIB 200 MG/1
200 CAPSULE ORAL DAILY
Qty: 90 CAPSULE | Refills: 0 | Status: SHIPPED | OUTPATIENT
Start: 2023-10-03 | End: 2023-12-04

## 2023-10-03 NOTE — TELEPHONE ENCOUNTER
Refill 90 day supply w/Refills  to [facility] MAIL ORDER    -celecoxib (CeleBREX) 200 mg capsule 1XDAY

## 2023-10-10 ENCOUNTER — TELEPHONE (OUTPATIENT)
Dept: PRIMARY CARE | Facility: CLINIC | Age: 83
End: 2023-10-10
Payer: MEDICARE

## 2023-10-11 ENCOUNTER — OFFICE VISIT (OUTPATIENT)
Dept: CARDIOLOGY | Facility: CLINIC | Age: 83
End: 2023-10-11
Payer: MEDICARE

## 2023-10-11 VITALS
HEART RATE: 83 BPM | RESPIRATION RATE: 20 BRPM | SYSTOLIC BLOOD PRESSURE: 143 MMHG | WEIGHT: 275 LBS | DIASTOLIC BLOOD PRESSURE: 82 MMHG | BODY MASS INDEX: 41.68 KG/M2 | HEIGHT: 68 IN

## 2023-10-11 DIAGNOSIS — I87.393 CHRONIC VENOUS HYPERTENSION WITH COMPLICATION INVOLVING BOTH SIDES: Primary | ICD-10-CM

## 2023-10-11 PROCEDURE — 99213 OFFICE O/P EST LOW 20 MIN: CPT | Mod: PO | Performed by: INTERNAL MEDICINE

## 2023-10-11 PROCEDURE — 3077F SYST BP >= 140 MM HG: CPT | Performed by: INTERNAL MEDICINE

## 2023-10-11 PROCEDURE — 3079F DIAST BP 80-89 MM HG: CPT | Performed by: INTERNAL MEDICINE

## 2023-10-11 PROCEDURE — 1126F AMNT PAIN NOTED NONE PRSNT: CPT | Performed by: INTERNAL MEDICINE

## 2023-10-11 PROCEDURE — 1159F MED LIST DOCD IN RCRD: CPT | Performed by: INTERNAL MEDICINE

## 2023-10-11 PROCEDURE — 1036F TOBACCO NON-USER: CPT | Performed by: INTERNAL MEDICINE

## 2023-10-11 PROCEDURE — 99213 OFFICE O/P EST LOW 20 MIN: CPT | Performed by: INTERNAL MEDICINE

## 2023-10-11 ASSESSMENT — PAIN SCALES - GENERAL: PAINLEVEL: 0-NO PAIN

## 2023-10-11 ASSESSMENT — COLUMBIA-SUICIDE SEVERITY RATING SCALE - C-SSRS
6. HAVE YOU EVER DONE ANYTHING, STARTED TO DO ANYTHING, OR PREPARED TO DO ANYTHING TO END YOUR LIFE?: NO
1. IN THE PAST MONTH, HAVE YOU WISHED YOU WERE DEAD OR WISHED YOU COULD GO TO SLEEP AND NOT WAKE UP?: NO
2. HAVE YOU ACTUALLY HAD ANY THOUGHTS OF KILLING YOURSELF?: NO

## 2023-10-11 NOTE — PROGRESS NOTES
"OUTPATIENT FOLLOW-UP -  VASCULAR MEDICINE    DOS: 10/11/23  Last seen:    7/24/23    REQUESTING PHYSICIAN:  Dr. Thomas Patino    REASON FOR FOLLOW-UP:  here for follow up CVHTN     HISTORY OF PRESENT ILLNESS:     82 yo lady here for follow up CVHTN and skin changes. Reports \"got a new knee\". States this was 2 months ago. Did not use any compression for the past 2 month - was not applied during admission or rehab. Still doing PT. Has more swelling now.     REVIEW OF SYSTEMS:     weight is stable  No sores, ulcers, rashes, skin lesions  + edema, no calf pain    PHYSICAL EXAMINATION:   Gen: Appears well, NAD  Ext: 1-2+ brawny edema gerard, nontender  Skin: LDS and hemosiderin gerard  Pulses: WWP  Mood and affect appropriate    ADDITIONAL DATA:   no compression worn today  right calf:  48.0cm  left calf:  49.0cm     ASSESSMENT/PLAN:    here for follow up CVHTN and skin changes- discussed double tubigrip now - once decongested - return to the stocking. Elevate at night. Increase walking. Follow up 6 months - sooner for new issues.     "

## 2023-10-11 NOTE — PATIENT INSTRUCTIONS
ASSESSMENT/PLAN:    here for follow up CVHTN and skin changes- discussed double tubigrip now - once decongested - return to the stocking. Elevate at night. Increase walking. Follow up 6 months - sooner for new issues.

## 2023-10-12 ENCOUNTER — OFFICE VISIT (OUTPATIENT)
Dept: ORTHOPEDIC SURGERY | Facility: CLINIC | Age: 83
End: 2023-10-12
Payer: MEDICARE

## 2023-10-12 DIAGNOSIS — Z96.652 HISTORY OF TOTAL KNEE REPLACEMENT, LEFT: Primary | ICD-10-CM

## 2023-10-12 PROCEDURE — 1036F TOBACCO NON-USER: CPT | Performed by: ORTHOPAEDIC SURGERY

## 2023-10-12 PROCEDURE — 1126F AMNT PAIN NOTED NONE PRSNT: CPT | Performed by: ORTHOPAEDIC SURGERY

## 2023-10-12 PROCEDURE — 3079F DIAST BP 80-89 MM HG: CPT | Performed by: ORTHOPAEDIC SURGERY

## 2023-10-12 PROCEDURE — 3077F SYST BP >= 140 MM HG: CPT | Performed by: ORTHOPAEDIC SURGERY

## 2023-10-12 PROCEDURE — 99024 POSTOP FOLLOW-UP VISIT: CPT | Performed by: ORTHOPAEDIC SURGERY

## 2023-10-12 PROCEDURE — 1159F MED LIST DOCD IN RCRD: CPT | Performed by: ORTHOPAEDIC SURGERY

## 2023-10-12 NOTE — PROGRESS NOTES
History of Present Illness:  Patient returns today endorsing mild pain.  Patient notes improving functional status. She is doing very well with her PT.     Physical Examination:  Well healed incision   ROM: 0-100  No laxity to varus / valgus stress  + Plantar/dorsiflexion  Negative Connie test    Assessment:  Patient status post left total knee arthroplasty, doing well    Plan:  Discussed analgesics.  Reviewed range of motion, strength goals. Recommended PT for an additional month to help build strength and stability.   Discussed activities to avoid  Dental prophylaxis discussed.  Follow-up:  2 months     Alexander Cat PA-C     In a face to face encounter, I evaluated the patient and performed a physical examination, discussed pertinent diagnostic studies if indicated and discussed diagnosis and management strategies with both the patient and physician assistant / nurse practitioner.  I reviewed the PA/NP's note and agree with the documented findings and plan of care.        Enrique Bond MD

## 2023-10-16 DIAGNOSIS — E03.9 HYPOTHYROIDISM, UNSPECIFIED TYPE: ICD-10-CM

## 2023-10-16 DIAGNOSIS — E55.9 MILD VITAMIN D DEFICIENCY: ICD-10-CM

## 2023-10-16 DIAGNOSIS — D50.0 BLOOD LOSS ANEMIA: ICD-10-CM

## 2023-10-16 DIAGNOSIS — I48.20 CHRONIC ATRIAL FIBRILLATION (MULTI): Primary | ICD-10-CM

## 2023-10-16 DIAGNOSIS — E78.5 HYPERLIPIDEMIA, UNSPECIFIED HYPERLIPIDEMIA TYPE: ICD-10-CM

## 2023-10-16 DIAGNOSIS — E79.0 HYPERURICEMIA: ICD-10-CM

## 2023-10-17 ENCOUNTER — TELEPHONE (OUTPATIENT)
Dept: PRIMARY CARE | Facility: CLINIC | Age: 83
End: 2023-10-17
Payer: MEDICARE

## 2023-10-17 DIAGNOSIS — E11.9 DIABETES MELLITUS TYPE 2 WITHOUT RETINOPATHY (MULTI): ICD-10-CM

## 2023-10-17 RX ORDER — EXENATIDE 2 MG/.85ML
1 INJECTION, SUSPENSION, EXTENDED RELEASE SUBCUTANEOUS
Qty: 12 EACH | Refills: 1 | Status: SHIPPED | OUTPATIENT
Start: 2023-10-17 | End: 2024-03-25

## 2023-10-17 NOTE — TELEPHONE ENCOUNTER
FRANSICO NIEVES,  CAN YOU VERIFY THAT NAVIN HAS BEEN TAKING BYDUREON SINCE 2019, AND ASK HER IF SHE HAD A COMPLETE THYROIDECTOMY AS TREATMENT FOR THYROID CANCER?  ALSO IF SHE REMEMBERS WHEN THIS OCCURED AND WHAT TYPE OF CANCER THE THYROID CANCER WAS?  [id number]

## 2023-10-20 NOTE — TELEPHONE ENCOUNTER
Pt called back to advise she had the thyroid surgery on 9/25/2013.  The type of thyroid cancer is unknown.

## 2023-10-25 ENCOUNTER — LAB (OUTPATIENT)
Dept: LAB | Facility: LAB | Age: 83
End: 2023-10-25
Payer: MEDICARE

## 2023-10-25 DIAGNOSIS — I48.20 CHRONIC ATRIAL FIBRILLATION (MULTI): ICD-10-CM

## 2023-10-25 LAB
INR PPP: 3 (ref 0.9–1.1)
PROTHROMBIN TIME: 33.8 SECONDS (ref 9.8–12.8)

## 2023-10-25 PROCEDURE — 36415 COLL VENOUS BLD VENIPUNCTURE: CPT

## 2023-10-25 PROCEDURE — 85610 PROTHROMBIN TIME: CPT

## 2023-10-27 ENCOUNTER — PATIENT OUTREACH (OUTPATIENT)
Dept: CARE COORDINATION | Facility: CLINIC | Age: 83
End: 2023-10-27
Payer: MEDICARE

## 2023-10-27 NOTE — PROGRESS NOTES
Call placed regarding one month post discharge follow up call.  At time of outreach call the patient feels as if their condition has improved since initial visit with PCP or specialist. Pt reports she is on her way to therapy and that she has met all of her goal. Pt states she believes that she has 2 more therapy appts. Pt reports she has been to follow ups with Dr. Blank who is pleased with her progress.  Questions or concerns regarding recovery period addressed at this time.   Reviewed any PCP or specialists progress notes/labs/radiology reports if applicable and addressed any questions or concerns.  Verified next PCP appt 11/7/2023.  Pt denies any needs at this time. Pt encouraged to call if questions or needs arise.

## 2023-11-01 ENCOUNTER — LAB (OUTPATIENT)
Dept: LAB | Facility: LAB | Age: 83
End: 2023-11-01
Payer: MEDICARE

## 2023-11-01 DIAGNOSIS — E55.9 MILD VITAMIN D DEFICIENCY: ICD-10-CM

## 2023-11-01 DIAGNOSIS — E78.5 HYPERLIPIDEMIA, UNSPECIFIED HYPERLIPIDEMIA TYPE: ICD-10-CM

## 2023-11-01 DIAGNOSIS — E03.9 ACQUIRED HYPOTHYROIDISM: ICD-10-CM

## 2023-11-01 DIAGNOSIS — E79.0 HYPERURICEMIA: ICD-10-CM

## 2023-11-01 DIAGNOSIS — E11.9 DIABETES MELLITUS TYPE 2 WITHOUT RETINOPATHY (MULTI): ICD-10-CM

## 2023-11-01 LAB
25(OH)D3 SERPL-MCNC: 19 NG/ML (ref 30–100)
ALBUMIN SERPL BCP-MCNC: 4.4 G/DL (ref 3.4–5)
ALP SERPL-CCNC: 80 U/L (ref 33–136)
ALT SERPL W P-5'-P-CCNC: 10 U/L (ref 7–45)
ANION GAP SERPL CALC-SCNC: 13 MMOL/L (ref 10–20)
AST SERPL W P-5'-P-CCNC: 13 U/L (ref 9–39)
BILIRUB SERPL-MCNC: 0.5 MG/DL (ref 0–1.2)
BUN SERPL-MCNC: 26 MG/DL (ref 6–23)
CALCIUM SERPL-MCNC: 9.3 MG/DL (ref 8.6–10.3)
CHLORIDE SERPL-SCNC: 102 MMOL/L (ref 98–107)
CO2 SERPL-SCNC: 27 MMOL/L (ref 21–32)
CREAT SERPL-MCNC: 1.24 MG/DL (ref 0.5–1.05)
ERYTHROCYTE [DISTWIDTH] IN BLOOD BY AUTOMATED COUNT: 14.3 % (ref 11.5–14.5)
GFR SERPL CREATININE-BSD FRML MDRD: 44 ML/MIN/1.73M*2
GLUCOSE SERPL-MCNC: 163 MG/DL (ref 74–99)
HCT VFR BLD AUTO: 38.7 % (ref 36–46)
HGB BLD-MCNC: 11.6 G/DL (ref 12–16)
MCH RBC QN AUTO: 27.4 PG (ref 26–34)
MCHC RBC AUTO-ENTMCNC: 30 G/DL (ref 32–36)
MCV RBC AUTO: 92 FL (ref 80–100)
NRBC BLD-RTO: 0 /100 WBCS (ref 0–0)
PLATELET # BLD AUTO: 253 X10*3/UL (ref 150–450)
POTASSIUM SERPL-SCNC: 4.1 MMOL/L (ref 3.5–5.3)
PROT SERPL-MCNC: 7.3 G/DL (ref 6.4–8.2)
RBC # BLD AUTO: 4.23 X10*6/UL (ref 4–5.2)
SODIUM SERPL-SCNC: 138 MMOL/L (ref 136–145)
T4 FREE SERPL-MCNC: 1.1 NG/DL (ref 0.61–1.12)
TSH SERPL-ACNC: 4.47 MIU/L (ref 0.44–3.98)
URATE SERPL-MCNC: 8.8 MG/DL (ref 2.3–6.7)
VIT B12 SERPL-MCNC: 113 PG/ML (ref 211–911)
WBC # BLD AUTO: 8.7 X10*3/UL (ref 4.4–11.3)

## 2023-11-01 PROCEDURE — 80053 COMPREHEN METABOLIC PANEL: CPT

## 2023-11-01 PROCEDURE — 85027 COMPLETE CBC AUTOMATED: CPT

## 2023-11-01 PROCEDURE — 83036 HEMOGLOBIN GLYCOSYLATED A1C: CPT

## 2023-11-01 PROCEDURE — 84443 ASSAY THYROID STIM HORMONE: CPT

## 2023-11-01 PROCEDURE — 82306 VITAMIN D 25 HYDROXY: CPT

## 2023-11-01 PROCEDURE — 82607 VITAMIN B-12: CPT

## 2023-11-01 PROCEDURE — 84550 ASSAY OF BLOOD/URIC ACID: CPT

## 2023-11-01 PROCEDURE — 84439 ASSAY OF FREE THYROXINE: CPT

## 2023-11-01 PROCEDURE — 36415 COLL VENOUS BLD VENIPUNCTURE: CPT

## 2023-11-02 DIAGNOSIS — M10.9 GOUT, UNSPECIFIED CAUSE, UNSPECIFIED CHRONICITY, UNSPECIFIED SITE: Primary | ICD-10-CM

## 2023-11-02 DIAGNOSIS — E53.8 VITAMIN B 12 DEFICIENCY: ICD-10-CM

## 2023-11-02 LAB
EST. AVERAGE GLUCOSE BLD GHB EST-MCNC: 140 MG/DL
HBA1C MFR BLD: 6.5 %

## 2023-11-02 RX ORDER — ALLOPURINOL 300 MG/1
300 TABLET ORAL DAILY
Qty: 90 TABLET | Refills: 3 | Status: SHIPPED | OUTPATIENT
Start: 2023-11-02 | End: 2023-11-07 | Stop reason: SDUPTHER

## 2023-11-07 ENCOUNTER — OFFICE VISIT (OUTPATIENT)
Dept: PRIMARY CARE | Facility: CLINIC | Age: 83
End: 2023-11-07
Payer: MEDICARE

## 2023-11-07 VITALS
WEIGHT: 275 LBS | HEART RATE: 83 BPM | RESPIRATION RATE: 14 BRPM | HEIGHT: 68 IN | DIASTOLIC BLOOD PRESSURE: 82 MMHG | SYSTOLIC BLOOD PRESSURE: 130 MMHG | BODY MASS INDEX: 41.68 KG/M2 | OXYGEN SATURATION: 97 %

## 2023-11-07 DIAGNOSIS — E11.9 DIABETES MELLITUS TYPE 2 WITHOUT RETINOPATHY (MULTI): ICD-10-CM

## 2023-11-07 DIAGNOSIS — I48.91 ATRIAL FIBRILLATION, UNSPECIFIED TYPE (MULTI): ICD-10-CM

## 2023-11-07 DIAGNOSIS — Z00.00 ROUTINE GENERAL MEDICAL EXAMINATION AT HEALTH CARE FACILITY: ICD-10-CM

## 2023-11-07 DIAGNOSIS — J45.40 MODERATE PERSISTENT ASTHMA WITHOUT COMPLICATION (HHS-HCC): ICD-10-CM

## 2023-11-07 DIAGNOSIS — M10.9 GOUT, UNSPECIFIED CAUSE, UNSPECIFIED CHRONICITY, UNSPECIFIED SITE: ICD-10-CM

## 2023-11-07 DIAGNOSIS — Z78.0 ASYMPTOMATIC MENOPAUSAL STATE: ICD-10-CM

## 2023-11-07 DIAGNOSIS — Z00.00 MEDICARE ANNUAL WELLNESS VISIT, SUBSEQUENT: Primary | ICD-10-CM

## 2023-11-07 PROCEDURE — 3075F SYST BP GE 130 - 139MM HG: CPT | Performed by: INTERNAL MEDICINE

## 2023-11-07 PROCEDURE — 83036 HEMOGLOBIN GLYCOSYLATED A1C: CPT | Performed by: INTERNAL MEDICINE

## 2023-11-07 PROCEDURE — G0439 PPPS, SUBSEQ VISIT: HCPCS | Performed by: INTERNAL MEDICINE

## 2023-11-07 PROCEDURE — 3079F DIAST BP 80-89 MM HG: CPT | Performed by: INTERNAL MEDICINE

## 2023-11-07 PROCEDURE — 99213 OFFICE O/P EST LOW 20 MIN: CPT | Performed by: INTERNAL MEDICINE

## 2023-11-07 PROCEDURE — 1159F MED LIST DOCD IN RCRD: CPT | Performed by: INTERNAL MEDICINE

## 2023-11-07 PROCEDURE — 1126F AMNT PAIN NOTED NONE PRSNT: CPT | Performed by: INTERNAL MEDICINE

## 2023-11-07 PROCEDURE — 1170F FXNL STATUS ASSESSED: CPT | Performed by: INTERNAL MEDICINE

## 2023-11-07 PROCEDURE — 1036F TOBACCO NON-USER: CPT | Performed by: INTERNAL MEDICINE

## 2023-11-07 RX ORDER — ALBUTEROL SULFATE 90 UG/1
2 AEROSOL, METERED RESPIRATORY (INHALATION) EVERY 4 HOURS PRN
Qty: 6.7 G | Refills: 5 | Status: SHIPPED | OUTPATIENT
Start: 2023-11-07 | End: 2024-02-15 | Stop reason: ALTCHOICE

## 2023-11-07 RX ORDER — COLCHICINE 0.6 MG/1
0.6 TABLET ORAL DAILY
Qty: 90 TABLET | Refills: 3 | Status: SHIPPED | OUTPATIENT
Start: 2023-11-07 | End: 2024-05-28 | Stop reason: SINTOL

## 2023-11-07 RX ORDER — ALLOPURINOL 100 MG/1
200 TABLET ORAL DAILY
Qty: 180 TABLET | Refills: 3 | Status: SHIPPED | OUTPATIENT
Start: 2023-11-07 | End: 2024-05-28 | Stop reason: SINTOL

## 2023-11-07 ASSESSMENT — ENCOUNTER SYMPTOMS
SHORTNESS OF BREATH: 0
JOINT SWELLING: 1
NAUSEA: 0
MYALGIAS: 0
DIARRHEA: 0
FEVER: 0
CHILLS: 0
COUGH: 0
VOMITING: 0
DIAPHORESIS: 0
RHINORRHEA: 1
CONSTIPATION: 0
WHEEZING: 1

## 2023-11-07 ASSESSMENT — PATIENT HEALTH QUESTIONNAIRE - PHQ9
1. LITTLE INTEREST OR PLEASURE IN DOING THINGS: NOT AT ALL
SUM OF ALL RESPONSES TO PHQ9 QUESTIONS 1 AND 2: 0
2. FEELING DOWN, DEPRESSED OR HOPELESS: NOT AT ALL
2. FEELING DOWN, DEPRESSED OR HOPELESS: NOT AT ALL
1. LITTLE INTEREST OR PLEASURE IN DOING THINGS: NOT AT ALL
SUM OF ALL RESPONSES TO PHQ9 QUESTIONS 1 AND 2: 0

## 2023-11-07 ASSESSMENT — ACTIVITIES OF DAILY LIVING (ADL)
BATHING: INDEPENDENT
MANAGING_FINANCES: INDEPENDENT
DRESSING: INDEPENDENT
GROCERY_SHOPPING: INDEPENDENT
DOING_HOUSEWORK: INDEPENDENT
TAKING_MEDICATION: INDEPENDENT

## 2023-11-07 NOTE — PATIENT INSTRUCTIONS
" YOU CAN WAIT TO GET SHINGRIX IMMUNIZATIONS IN A FEW WEEKS ALONG WITH PREVNAR-20 PNEUMONIA IMMUNIZATION    2.  I SUSPECT THAT THE TRAVELLING ARTHRITIS YOU ARE EXPERIENCING IS ACTUALLY A NASTY FORM OF GOUT CALLED \"INTERCRITICAL\" GOUT - IT KEEPS COMING BACK ALMOST CONSTANTLY    3.  THE WHITE GROWTH UNDER THE SKIN ON YOUR LEFT MIDDLE FINGER IS A \"TOPHUS\" OR GROWTH OF PURE URIC ACID THAT CAN BE SEEN IN PEOPLE WHO HAVE SUFFERED FROM GOUT FOR A LONG TIME    4.  RECOMMEND START COLCHICINE 0.6 MG ONCE DAILY FOR 2 WEEKS, THEN START ALLOPURINOL 100MG TAKE 2 PZI=828 MG ONCE DAILY, AND STAY ON BOTH MEDS.    5.  AFTER BEING ON ALLOPURINOL AND COLCHICINE FOR 8 WEEKS, WILL RECHECK LABS TO MAKE SURE THEY AREN'T ADVERSELY AFFECTING THE KIDNEYS    6.  VITAMIND LEVEL IS LOW, RECOMMEND YOU START VITAMIN D3 5000 IU DAILY FOR 3 MONTHS, THEN GO DOWN TO 2000 IU DAILY PERMANENTLY THEREAFTER    7.  VITAMIN B12 IS VERY LOW, RECOMMEND YOU START VITAMIN B12 1000 MCG DAILY ORAL SUPPLEMENT.  IN 8 WEEKS, WILL RECHECK THE VITAMIN B12 LEVEL TO MAKE SURE IT COMES UP.  IF IT DOESN'T COME UP WITH THE PILL, THEN YOU WILL NEED INJECTION THERAPY MONTHLY    8.  YOU MAY TRY FLONASE NASAL SPRAY OVER THE COUNTER FOR A FEW MONTHS TO SEE IF THIS CAN HELP THE CHRONIC NOSE DRIPPING.  IF IT DOESN'T HELP, THEN JUST STOP THE FLONASE    9.  ALBUTEROL INHALER IS SENT TO PHARMACY FOR AS NEEDED USE FOR ASTHMA UNTIL YOU CAN GET TO SEE DR. MCGARRY    10 FOLLOW UP 3 MONTHS OR AS NEEDED    11.  YOU ARE OTHERWISE CAUGHT UP FROM A HEALTH SCREENING STANDPOINT FOR MEDICARE.    12 .  A BONE DENSITY TEST IS ORDERED DUE TO LOW VITAMIN D LEVEL, LAST ONE WAS IN 2019  "

## 2023-11-07 NOTE — PROGRESS NOTES
"Subjective   Reason for Visit: Promise Perry is an 83 y.o. female here for a Medicare Wellness visit.    HAD FLU SHOT AND COVID BOOSTER   ? PNEUMO AND SHINGLES PHARMACIST ADVISED NEEDED BUT TO WAIT SINCE SHE JUST HAD THE OTHER 2     Past Medical, Surgical, and Family History reviewed and updated in chart.    Reviewed all medications by prescribing practitioner or clinical pharmacist (such as prescriptions, OTCs, herbal therapies and supplements) and documented in the medical record.    HPI  ARTHRITIS ISSUES    MOVING AROUND HER HANDS, HAS HAD HOT SWOLLEN JOINTS AND THEY MOVE AROUND.    IT REALLY HURTS HER IN HER FOOT, INITIALLY THOUGHT IT WAS A BONE ISSUE, BUT WAS TOLD AFTER MRI THAT IT WAS ARTHRITIS    HAVE A RUNNY NOSE, CLEAR WATER, ALL THE TIME    HAS AN APPOINTMENT WITH DR. MALGORZATA PETERSON FOR ASTHMA/COPD, WANT INHALER TO USE AS NEEDED UNTIL CAN SEE HIM    LABS REVIEWED WITH PT, LOW VITD, VERY LOW B12, HIGH URIC ACID    STOPPED ALLOPURINOL AWHILE AGO BECAUSE DIDN'T THINK IT WAS DOING ANYTHING, WAS GIVEN BY ANOTHER PROVIDER      Patient Care Team:  Thomas Patino MD as PCP - General (Internal Medicine)  Chris Patel MD as PCP [facility] [place] [place] [facility] [facility]  Cecilia Lee RN as Care Manager (Case Management)     Review of Systems   Constitutional:  Negative for chills, diaphoresis and fever.   HENT:  Positive for rhinorrhea.    Respiratory:  Positive for wheezing. Negative for cough and shortness of breath.    Cardiovascular:  Negative for chest pain and leg swelling.   Gastrointestinal:  Negative for constipation, diarrhea, nausea and vomiting.   Musculoskeletal:  Positive for joint swelling. Negative for myalgias.        TRAVELLING ARTHRITIS       Objective   Vitals:  /82   Pulse 83   Resp 14   Ht 1.727 m (5' 8\")   Wt 125 kg (275 lb)   SpO2 97%   BMI 41.81 kg/m²       Physical Exam  Vitals reviewed.   Constitutional:       General: She is not in acute distress.     Appearance: She is obese. She " "is not ill-appearing.   Cardiovascular:      Rate and Rhythm: Normal rate and regular rhythm.      Pulses: Normal pulses.      Heart sounds:      No gallop.   Pulmonary:      Breath sounds: Normal breath sounds. No wheezing, rhonchi or rales.   Abdominal:      General: Abdomen is flat. Bowel sounds are normal.      Palpations: Abdomen is soft.      Tenderness: There is no guarding or rebound.   Musculoskeletal:      Right lower leg: No edema.      Left lower leg: No edema.      Comments: Left arthroplasty incision is closed and dry  THERE IS A TOPHUS ON THE MEDIAL ASPECT OF LEFT 3RD PIP OF HAND  RED AND TENDER BILATERAL 1ST AND 5TH MTP JOINTS         Assessment/Plan   Problem List Items Addressed This Visit       Atrial fibrillation (CMS/[facility])    Relevant Orders    Protime-INR    Diabetes mellitus type 2 without retinopathy (CMS/[facility])    Relevant Orders    POCT glycosylated hemoglobin (Hb A1C) manually resulted    Medicare annual wellness visit, subsequent - Primary     Other Visit Diagnoses       Asymptomatic menopausal state        Relevant Orders    XR DEXA bone density    Routine general medical examination at health care facility        Moderate persistent asthma without complication        Relevant Medications    albuterol (Proventil HFA) 90 mcg/actuation inhaler    Gout, unspecified cause, unspecified chronicity, unspecified site        Relevant Medications    allopurinol (Zyloprim) 100 mg tablet    colchicine 0.6 mg tablet          Patient Instructions    YOU CAN WAIT TO GET SHINGRIX IMMUNIZATIONS IN A FEW WEEKS ALONG WITH PREVNAR-20 PNEUMONIA IMMUNIZATION    2.  I SUSPECT THAT THE TRAVELLING ARTHRITIS YOU ARE EXPERIENCING IS ACTUALLY A NASTY FORM OF GOUT CALLED \"INTERCRITICAL\" GOUT - IT KEEPS COMING BACK ALMOST CONSTANTLY    3.  THE WHITE GROWTH UNDER THE SKIN ON YOUR LEFT MIDDLE FINGER IS A \"TOPHUS\" OR GROWTH OF PURE URIC ACID THAT CAN BE SEEN IN PEOPLE WHO HAVE SUFFERED FROM GOUT FOR A LONG TIME    4.  " RECOMMEND START COLCHICINE 0.6 MG ONCE DAILY FOR 2 WEEKS, THEN START ALLOPURINOL 100MG TAKE 2 UJE=703 MG ONCE DAILY, AND STAY ON BOTH MEDS.    5.  AFTER BEING ON ALLOPURINOL AND COLCHICINE FOR 8 WEEKS, WILL RECHECK LABS TO MAKE SURE THEY AREN'T ADVERSELY AFFECTING THE KIDNEYS    6.  VITAMIND LEVEL IS LOW, RECOMMEND YOU START VITAMIN D3 5000 IU DAILY FOR 3 MONTHS, THEN GO DOWN TO 2000 IU DAILY PERMANENTLY THEREAFTER    7.  VITAMIN B12 IS VERY LOW, RECOMMEND YOU START VITAMIN B12 1000 MCG DAILY ORAL SUPPLEMENT.  IN 8 WEEKS, WILL RECHECK THE VITAMIN B12 LEVEL TO MAKE SURE IT COMES UP.  IF IT DOESN'T COME UP WITH THE PILL, THEN YOU WILL NEED INJECTION THERAPY MONTHLY    8.  YOU MAY TRY FLONASE NASAL SPRAY OVER THE COUNTER FOR A FEW MONTHS TO SEE IF THIS CAN HELP THE CHRONIC NOSE DRIPPING.  IF IT DOESN'T HELP, THEN JUST STOP THE FLONASE    9.  ALBUTEROL INHALER IS SENT TO PHARMACY FOR AS NEEDED USE FOR ASTHMA UNTIL YOU CAN GET TO SEE DR. MCGARRY    10 FOLLOW UP 3 MONTHS OR AS NEEDED    11.  YOU ARE OTHERWISE CAUGHT UP FROM A HEALTH SCREENING STANDPOINT FOR MEDICARE.    12 .  A BONE DENSITY TEST IS ORDERED DUE TO LOW VITAMIN D LEVEL, LAST ONE WAS IN 2019

## 2023-11-13 ENCOUNTER — ANCILLARY PROCEDURE (OUTPATIENT)
Dept: RADIOLOGY | Facility: CLINIC | Age: 83
End: 2023-11-13
Payer: MEDICARE

## 2023-11-13 DIAGNOSIS — Z78.0 ASYMPTOMATIC MENOPAUSAL STATE: ICD-10-CM

## 2023-11-14 ENCOUNTER — APPOINTMENT (OUTPATIENT)
Dept: RADIOLOGY | Facility: CLINIC | Age: 83
End: 2023-11-14
Payer: MEDICARE

## 2023-11-15 ENCOUNTER — ANCILLARY PROCEDURE (OUTPATIENT)
Dept: RADIOLOGY | Facility: CLINIC | Age: 83
End: 2023-11-15
Payer: MEDICARE

## 2023-11-15 PROCEDURE — 77080 DXA BONE DENSITY AXIAL: CPT

## 2023-11-15 PROCEDURE — 77080 DXA BONE DENSITY AXIAL: CPT | Performed by: STUDENT IN AN ORGANIZED HEALTH CARE EDUCATION/TRAINING PROGRAM

## 2023-12-04 DIAGNOSIS — M19.90 ARTHRITIS: ICD-10-CM

## 2023-12-04 RX ORDER — CELECOXIB 200 MG/1
200 CAPSULE ORAL DAILY
Qty: 90 CAPSULE | Refills: 3 | Status: SHIPPED | OUTPATIENT
Start: 2023-12-04

## 2023-12-08 ENCOUNTER — PATIENT OUTREACH (OUTPATIENT)
Dept: CARE COORDINATION | Facility: CLINIC | Age: 83
End: 2023-12-08
Payer: MEDICARE

## 2023-12-08 NOTE — PROGRESS NOTES
Call placed regarding 90 day post discharge follow up call.  At time of outreach call the patient feels as if their condition has improved since initial visit with PCP or specialist. Pt reports she is moving around well and has no discomfort. Pt states she has all of her medication.   Pt denies any questions, needs, or concerns at this time. Pt encouraged to call if questions arise.    -Verified next PCP appt 2/13/2024 1300

## 2023-12-14 ENCOUNTER — OFFICE VISIT (OUTPATIENT)
Dept: ORTHOPEDIC SURGERY | Facility: CLINIC | Age: 83
End: 2023-12-14
Payer: MEDICARE

## 2023-12-14 DIAGNOSIS — Z96.652 S/P TOTAL KNEE ARTHROPLASTY, LEFT: Primary | ICD-10-CM

## 2023-12-14 PROCEDURE — 1126F AMNT PAIN NOTED NONE PRSNT: CPT | Performed by: ORTHOPAEDIC SURGERY

## 2023-12-14 PROCEDURE — 99213 OFFICE O/P EST LOW 20 MIN: CPT | Performed by: ORTHOPAEDIC SURGERY

## 2023-12-14 PROCEDURE — 1036F TOBACCO NON-USER: CPT | Performed by: ORTHOPAEDIC SURGERY

## 2023-12-14 PROCEDURE — 1159F MED LIST DOCD IN RCRD: CPT | Performed by: ORTHOPAEDIC SURGERY

## 2023-12-14 NOTE — PROGRESS NOTES
History of Present Illness:  Patient returns today endorsing minimal pain.  Patient notes improving functional status. Overall she is doing very well, PT has discharged her. She had a wonderful experience at [facility] rehab facility after her TKA and states she would highly recommend their services.     Physical Examination:  Well healed incision   ROM: 0-115  No laxity to varus / valgus stress  + Plantar/dorsiflexion  Negative Connie test    Assessment:  Patient status post left total knee arthroplasty, doing well    Plan:  Discussed improvement in strength, swelling over the course of the first year post op.  Discussed return to activities and activities to avoid.  Dental prophylaxis discussed.  Follow-up:  Not needed at this time    Alexander Cat PA-C      In a face to face encounter, I evaluated the patient and performed a physical examination, discussed pertinent diagnostic studies if indicated and discussed diagnosis and management strategies with both the patient and physician assistant / nurse practitioner.  I reviewed the PA/NP's note and agree with the documented findings and plan of care.        Enrique Bond MD'

## 2023-12-19 ENCOUNTER — TELEPHONE (OUTPATIENT)
Dept: PRIMARY CARE | Facility: CLINIC | Age: 83
End: 2023-12-19
Payer: MEDICARE

## 2023-12-19 DIAGNOSIS — F41.9 ANXIETY AND DEPRESSION: ICD-10-CM

## 2023-12-19 DIAGNOSIS — F32.A ANXIETY AND DEPRESSION: ICD-10-CM

## 2023-12-19 RX ORDER — CITALOPRAM 20 MG/1
20 TABLET, FILM COATED ORAL DAILY
Qty: 90 TABLET | Refills: 3 | Status: SHIPPED | OUTPATIENT
Start: 2023-12-19 | End: 2024-12-18

## 2023-12-19 NOTE — TELEPHONE ENCOUNTER
Refill 90 day supply w/Refills  to [facility]    -citalopram (CeleXA) 20 mg tablet 1XDAY

## 2024-01-04 ENCOUNTER — OFFICE VISIT (OUTPATIENT)
Dept: PULMONOLOGY | Facility: CLINIC | Age: 84
End: 2024-01-04
Payer: MEDICARE

## 2024-01-04 VITALS
WEIGHT: 260.8 LBS | HEIGHT: 68 IN | TEMPERATURE: 97.3 F | DIASTOLIC BLOOD PRESSURE: 74 MMHG | BODY MASS INDEX: 39.53 KG/M2 | SYSTOLIC BLOOD PRESSURE: 133 MMHG | OXYGEN SATURATION: 94 % | HEART RATE: 102 BPM | RESPIRATION RATE: 16 BRPM

## 2024-01-04 DIAGNOSIS — J42 CHRONIC BRONCHITIS, UNSPECIFIED CHRONIC BRONCHITIS TYPE (MULTI): ICD-10-CM

## 2024-01-04 DIAGNOSIS — E66.01 MORBID OBESITY (MULTI): ICD-10-CM

## 2024-01-04 DIAGNOSIS — R06.09 DYSPNEA ON EXERTION: Primary | ICD-10-CM

## 2024-01-04 DIAGNOSIS — R53.81 PHYSICAL DECONDITIONING: ICD-10-CM

## 2024-01-04 PROCEDURE — 1126F AMNT PAIN NOTED NONE PRSNT: CPT | Performed by: INTERNAL MEDICINE

## 2024-01-04 PROCEDURE — 99204 OFFICE O/P NEW MOD 45 MIN: CPT | Performed by: INTERNAL MEDICINE

## 2024-01-04 PROCEDURE — 1036F TOBACCO NON-USER: CPT | Performed by: INTERNAL MEDICINE

## 2024-01-04 PROCEDURE — 3075F SYST BP GE 130 - 139MM HG: CPT | Performed by: INTERNAL MEDICINE

## 2024-01-04 PROCEDURE — 1159F MED LIST DOCD IN RCRD: CPT | Performed by: INTERNAL MEDICINE

## 2024-01-04 PROCEDURE — 3078F DIAST BP <80 MM HG: CPT | Performed by: INTERNAL MEDICINE

## 2024-01-04 ASSESSMENT — ENCOUNTER SYMPTOMS
LOSS OF SENSATION IN FEET: 0
OCCASIONAL FEELINGS OF UNSTEADINESS: 1
DEPRESSION: 0

## 2024-01-04 ASSESSMENT — COLUMBIA-SUICIDE SEVERITY RATING SCALE - C-SSRS
1. IN THE PAST MONTH, HAVE YOU WISHED YOU WERE DEAD OR WISHED YOU COULD GO TO SLEEP AND NOT WAKE UP?: NO
6. HAVE YOU EVER DONE ANYTHING, STARTED TO DO ANYTHING, OR PREPARED TO DO ANYTHING TO END YOUR LIFE?: NO
2. HAVE YOU ACTUALLY HAD ANY THOUGHTS OF KILLING YOURSELF?: NO

## 2024-01-04 ASSESSMENT — PATIENT HEALTH QUESTIONNAIRE - PHQ9
1. LITTLE INTEREST OR PLEASURE IN DOING THINGS: NOT AT ALL
SUM OF ALL RESPONSES TO PHQ9 QUESTIONS 1 AND 2: 0
2. FEELING DOWN, DEPRESSED OR HOPELESS: NOT AT ALL

## 2024-01-04 NOTE — PROGRESS NOTES
Department of Medicine  Division of Pulmonary, Critical Care, and Sleep Medicine  Consultation  [facility] - [address], [address]    I was asked by Dr. Patino, to evaluate Ms. Promise Perry for chronic dyspnea on exertion. I have independently interviewed and examined the patient in the office and reviewed available records.    Physician HPI (1/4/2024):  Ms. Perry is a pleasant 83-year-old female with past medical history significant for chronic atrial fibrillation on Coumadin, HFpEF, obesity, reported history of COPD, lower extremity venous insufficiency and obstructive sleep apnea on CPAP who presented to the office today for evaluation of chronic dyspnea on exertion.    Promise reports shortness of breath with mild to moderate exertions around the home over the past year.  She described it as a feeling that chest is heavy and she is running out of air that she had to stop.  It would occur after walking for short distance such as walking from the parking garage to the office today.  Shortness of breath is not associated with chest pain, palpitation or diaphoresis.  She reports intermittent wheezing mainly in the morning with clear sputum production.  No orthopnea or PND.  She denies shortness of breath at rest.  She has had an excellent compliance with CPAP for sleep apnea at night.  She is following up with vascular medicine for lower extremity venous insufficiency.  She underwent total knee replacement in August 2023 for severe osteoarthritis.  She reports potential need of total knee replacement in the right knee as well in the near future.  She used to [place] [facility] where [place] was told to have asthma and COPD as she reports.  Records were not available to review today.    Promise denies past history of asthma in childhood or recurrent sinopulmonary infections.  She is a former smoker of half a pack of day of cigarettes between age [age].  No high risk occupational exposures  otherwise.  No family history of chronic pulmonary disease.  She is to have a dog at home.  No birds exposures or farm work.    Immunizations:  Immunization History   Administered Date(s) Administered    Flu vaccine, quadrivalent, high-dose, preservative free, age 65y+ (FLUZONE) 10/04/2021, 10/05/2023    Influenza, High Dose Seasonal, Preservative Free 12/06/2016, 10/18/2017, 09/07/2018    Influenza, Seasonal, Quadrivalent, Adjuvanted 11/17/2022    Influenza, Unspecified 09/09/2010, 10/08/2012, 09/09/2013, 09/08/2014    Influenza, seasonal, injectable 11/01/2015, 12/01/2016, 09/01/2017    Influenza, seasonal, injectable, preservative free 11/21/2015, 09/16/2020    Influenza, trivalent, adjuvanted 09/10/2019    Pfizer COVID-19 vaccine, Fall 2023, 12 years and older, (30mcg/0.3mL) 10/05/2023    Pfizer COVID-19 vaccine, bivalent, age 12 years and older (30 mcg/0.3 mL) 11/17/2022    Pfizer Gray Cap SARS-CoV-2 04/01/2022    Pfizer Purple Cap SARS-CoV-2 02/02/2021, 02/23/2021, 11/03/2021    Pneumococcal conjugate vaccine, 13-valent (PREVNAR 13) 09/10/2019    Pneumococcal polysaccharide vaccine, 23-valent, age 2 years and older (PNEUMOVAX 23) 09/16/2020    RSV, 60 Years And Older (AREXVY) 10/05/2023       Past Medical History:  Past Medical History:   Diagnosis Date    Chronic kidney disease, unspecified 07/21/2013    Chronic kidney disease    Chronic obstructive pulmonary disease, unspecified (CMS/HCC) 04/16/2018    Chronic obstructive pulmonary disease    Chronic obstructive pulmonary disease, unspecified (CMS/HCC) 06/18/2018    Chronic obstructive pulmonary disease    Chronic venous hypertension (idiopathic) with other complications of unspecified lower extremity 04/16/2018    Chronic venous hypertension with complication    Chronic venous hypertension (idiopathic) with other complications of unspecified lower extremity 06/18/2018    Chronic venous hypertension with complication    Diverticulosis of intestine, part  unspecified, without perforation or abscess without bleeding 07/21/2013    Diverticulosis    Edema, unspecified 04/16/2018    Edema    Edema, unspecified 06/18/2018    Edema    Essential (primary) hypertension 12/17/2022    Hypertension    Essential (primary) hypertension 07/21/2013    Hypertension    Gastro-esophageal reflux disease without esophagitis 07/21/2013    Esophageal reflux    Gastrointestinal hemorrhage, unspecified 07/21/2013    Gastrointestinal bleeding    Hyperlipidemia, unspecified 07/21/2013    Hyperlipidemia    Hypothyroidism, unspecified 04/16/2018    Hypothyroidism    Hypothyroidism, unspecified 06/18/2018    Hypothyroidism    Malignant neoplasm of thyroid gland (CMS/[facility]) 12/16/2022    Thyroid cancer    Obstructive sleep apnea (adult) (pediatric) 04/16/2018    Sleep apnea, obstructive    Obstructive sleep apnea (adult) (pediatric) 06/18/2018    Sleep apnea, obstructive    Other disorders of electrolyte and fluid balance, not elsewhere classified 04/16/2018    Electrolyte and fluid disorder    Other disorders of electrolyte and fluid balance, not elsewhere classified 06/18/2018    Electrolyte and fluid disorder    Other fatigue 04/16/2018    Fatigue    Other fatigue 06/18/2018    Fatigue    Other forms of dyspnea 04/16/2018    Dyspnea on effort    Other forms of dyspnea 06/18/2018    Dyspnea on effort    Pain in unspecified knee 06/18/2018    Joint pain, knee    Personal history of other specified conditions 04/10/2017    History of fatigue    Unspecified abdominal hernia without obstruction or gangrene     Hernia    Unspecified atrial fibrillation (CMS/[facility]) 12/16/2022    Atrial fibrillation    Unspecified diastolic (congestive) heart failure (CMS/[facility]) 10/18/2022    (HFpEF) heart failure with preserved ejection fraction    Unspecified osteoarthritis, unspecified site 04/16/2018    Arthritis    Unspecified osteoarthritis, unspecified site 06/18/2018    Arthritis       Past Surgical History:  Past  Surgical History:   Procedure Laterality Date    CATARACT EXTRACTION  03/12/2018    Cataract Extraction    COLONOSCOPY  08/28/2014    Complete Colonoscopy    CT ANGIO CORONARY ART WITH HEARTFLOW IF SCORE >30%  8/15/2017    CT HEART CORONARY ANGIOGRAM 8/15/2017 [facility]CILLARY LEGACY    ESOPHAGOGASTRODUODENOSCOPY  08/28/2014    Diagnostic Esophagogastroduodenoscopy    INCISIONAL BREAST BIOPSY  09/25/2013    Incisional Breast Biopsy    TOTAL THYROIDECTOMY  09/25/2013    Thyroid Surgery Total Thyroidectomy       Family History:  No family history on file.    Social History:  Social History     Tobacco Use    Smoking status: Former     Types: Cigarettes    Smokeless tobacco: Never   Substance Use Topics    Alcohol use: Not Currently     Comment: OCC WINE    Drug use: Not Currently        Occupational & Environmental History:   Did office work. Retired recently.  No high risk occupational exposures.     Medications:  Current Outpatient Medications   Medication Instructions    acetaminophen (Tylenol) 325 mg tablet oral, Every 6 hours PRN    albuterol (Proventil HFA) 90 mcg/actuation inhaler 2 puffs, inhalation, Every 4 hours PRN    allopurinol (ZYLOPRIM) 200 mg, oral, Daily    AmaryL 1 mg tablet 1 tablet, oral, Daily    blood sugar diagnostic (OneTouch Verio test strips) strip Daily RT    celecoxib (CELEBREX) 200 mg, oral, Daily    citalopram (CELEXA) 20 mg, oral, Daily    colchicine 0.6 mg, oral, Daily    docusate sodium (Colace) 100 mg capsule oral, 2 times daily    exenatide microspheres (Bydureon BCise) 2 mg/0.85 mL auto-injector 1 Syringe, subcutaneous, Weekly, once a week    hydrALAZINE (APRESOLINE) 50 mg, oral, 2 times daily    levothyroxine (SYNTHROID, LEVOXYL) 150 mcg, oral, Daily, Take 1 tablet daily for 6 days then 1/2 tab on 7th day    lisinopril 20 mg tablet 1 tablet, oral, Daily    metFORMIN XR (Glucophage-XR) 500 mg 24 hr tablet 3 tablets, oral, Daily    metoprolol succinate XL (Toprol-XL) 50 mg 24 hr tablet  "oral    polyethylene glycol (GLYCOLAX, MIRALAX) 17 g, oral, 2 times daily    psyllium (Metamucil, sugar,) powder oral    simvastatin (ZOCOR) 40 mg, oral, Daily    torsemide (DEMADEX) 10 mg, oral, Daily    verapamil SR (Calan-SR) 240 mg ER tablet 1 tablet, oral, Daily    warfarin (Coumadin) 5 mg tablet oral, 1 tab daily 1/2 tab sat none on sun         Drug Allergies/Intolerances:  Allergies   Allergen Reactions    Ceclor [Cefaclor] Unknown    Codeine Nausea Only    Lisinopril Other     severve hypotension    Losartan Unknown    Quinidine Unknown    Erythromycin Rash        Visit Vitals  Ht 1.727 m (5' 8\")   Wt 118 kg (260 lb 12.8 oz)   BMI 39.65 kg/m²   Smoking Status Former   BSA 2.38 m²        Physical Exam  Vitals and nursing note reviewed.   Constitutional:       General: She is not in acute distress.     Appearance: Normal appearance. She is obese.   HENT:      Head: Normocephalic and atraumatic.      Nose: Nose normal.      Mouth/Throat:      Mouth: Mucous membranes are moist.   Eyes:      Conjunctiva/sclera: Conjunctivae normal.   Cardiovascular:      Rate and Rhythm: Normal rate. Rhythm irregular.      Comments: LE edema bilaterally.   Pulmonary:      Effort: Pulmonary effort is normal. No respiratory distress.      Breath sounds: Normal breath sounds. No stridor. No wheezing or rhonchi.   Musculoskeletal:      Cervical back: Neck supple.   Skin:     General: Skin is warm and dry.      Coloration: Skin is not jaundiced.   Neurological:      General: No focal deficit present.      Mental Status: She is alert and oriented to person, place, and time. Mental status is at baseline.   Psychiatric:         Mood and Affect: Mood normal.         Behavior: Behavior normal.         Judgment: Judgment normal.            Chest Radiograph     XR chest 2 view 08/03/2023    Narrative  Interpreted By:  GUERDA WESLEY MD  MRN: [id number]  Patient Name: NAVIN CHUNG    STUDY:  TH CHEST 2 VIEW PA AND LAT;  8/3/2023 2:47 " pm    INDICATION:  copd/chf; pre-op .    COMPARISON:  Chest radiograph 12/07/2018    ACCESSION NUMBER(S):  [id number]    ORDERING CLINICIAN:  LINSEY HOOKS    FINDINGS:  PA and lateral radiographs of the chest were provided.    DEVICES:  None    CARDIOMEDIASTINAL SILHOUETTE:  Cardiomediastinal silhouette is stable in size and  configuration.Atherosclerotic calcifications of the aortic arch.    LUNGS:  Mild bilateral interstitial thickening in the perihilar and mid to  lower lung zones. Stable right-greater-than-left hilar prominence. No  pneumothorax. No pleural effusion. Left lower lobe subsegmental  atelectasis.    BONES:  No acute osseous changes.    Impression  1.  Findings compatible with vascular congestion.      Echocardiogram     Echocardiogram     Narrative  [facility], [address] [place] [address]  Tel [phone] and Fax [phone]    TRANSTHORACIC ECHOCARDIOGRAM REPORT      Patient Name:     NAVINCONNOR CHUNG Reading Physician:   [id number] Jewel Rodriguez MD  Study Date:       11/8/2022       Referring Physician: MAKAYLA LOFTON  MRN/PID:          [id number]        PCP:  Accession/Order#: [id number]    Department Location: [facility]  YOB: 1940       Fellow:  Gender:           F               Nurse:  Admit Date:                       Sonographer:         France Levin [facility]  Admission Status: Outpatient      Additional Staff:  Height:           172.72 cm       CC Report to:  Weight:           122.47 kg       Study Type:          Echocardiogram  BSA:              2.32 m2  Blood Pressure: 119 /68 mmHg    Diagnosis/ICD: I50.30-Unspecified diastolic (congestive) heart failure (CHF)  Indication:    HFpEF, HASKINS  Procedure/CPT: Echo Complete w Full [id number]    Patient History:  Pertinent History: HFpEF, HASKINS, COPD, ANGI, chronic A-fib.    Study Detail: The following Echo studies were performed: 2D, M-Mode, Doppler and  color flow.      PHYSICIAN INTERPRETATION:  Left  Ventricle: The left ventricular systolic function is normal, with an estimated ejection fraction of 55-60%. The patient is in atrial fibrillation which may influence the estimate of left ventricular function and transvalvular flows. There are no regional wall motion abnormalities. The left ventricular cavity size is normal. The left ventricular septal wall thickness is mildly increased. Left ventricular diastolic filling was indeterminate.  Left Atrium: The left atrium is severely dilated.  Right Ventricle: The right ventricle is normal in size. There is normal right ventricular global systolic function.  Right Atrium: The right atrium is moderately to severely dilated.  Aortic Valve: The aortic valve is trileaflet. There is mild aortic valve cusp calcification. There is trivial aortic valve regurgitation. The peak instantaneous gradient of the aortic valve is 5.7 mmHg. The mean gradient of the aortic valve is 3.9 mmHg.  Mitral Valve: The mitral valve is normal in structure. There is mild mitral annular calcification. There is trace to mild mitral valve regurgitation.  Tricuspid Valve: The tricuspid valve is structurally normal. There is mild tricuspid regurgitation. The Doppler estimated RVSP is mildly elevated at 36.6 mmHg.  Pulmonic Valve: The pulmonic valve is structurally normal. There is physiologic pulmonic valve regurgitation.  Pericardium: There is a small pericardial effusion.  Aorta: The aortic root is normal. There is mild dilatation of the ascending aorta.  Systemic Veins: The inferior vena cava appears to be of normal size. There is IVC inspiratory collapse greater than 50%.  In comparison to the previous echocardiogram(s): Compared with study from 10/22/2021, no significant change.      CONCLUSIONS:  1. Left ventricular systolic function is normal with a 55-60% estimated ejection fraction.  2. The left atrium is severely dilated.  3. The right atrium is moderately to severely dilated.  4. Mildly  elevated RVSP.  5. The patient is in atrial fibrillation which may influence the estimate of left ventricular function and transvalvular flows.    QUANTITATIVE DATA SUMMARY:  2D MEASUREMENTS:  Normal Ranges:  Ao Root d:     3.40 cm   (2.0-3.7cm)  LAs:           4.98 cm   (2.7-4.0cm)  IVSd:          1.18 cm   (0.6-1.1cm)  LVPWd:         0.99 cm   (0.6-1.1cm)  LVIDd:         5.04 cm   (3.9-5.9cm)  LVIDs:         2.91 cm  LV Mass Index: 88.7 g/m2  LV % FS        42.3 %    LA VOLUME:  Normal Ranges:  LA Vol A4C:        133.6 ml   (22+/-6mL/m2)  LA Vol A2C:        135.4 ml  LA Vol BP:         139.2 ml  LA Vol Index A4C:  57.5ml/m2  LA Vol Index A2C:  58.3 ml/m2  LA Vol Index BP:   59.9 ml/m2  LA Area A4C:       33.4 cm2  LA Area A2C:       34.8 cm2  LA Major Axis A4C: 7.1 cm  LA Major Axis A2C: 7.6 cm  LA Vol A4C:        127.7 ml  LA Vol A2C:        127.5 ml    AORTA MEASUREMENTS:  Normal Ranges:  Asc Ao, d: 3.80 cm (2.1-3.4cm)    LV SYSTOLIC FUNCTION BY 2D PLANIMETRY (MOD):  Normal Ranges:  EF-A4C View: 55.7 % (>=55%)  EF-A2C View: 47.5 %  EF-Biplane:  52.1 %    LV DIASTOLIC FUNCTION:  Normal Ranges:  MV Peak E:  1.09 m/s (0.7-1.2 m/s)  MV e'       0.11 m/s (>8.0)  E/e' Ratio: 10.35    (<8.0)    AORTIC VALVE:  Normal Ranges:  AoV Vmax:                1.19 m/s (<=1.7m/s)  AoV Peak P.7 mmHg (<20mmHg)  AoV Mean PG:             3.9 mmHg (1.7-11.5mmHg)  LVOT Max Blane:            0.88 m/s (<=1.1m/s)  AoV VTI:                 23.25 cm (18-25cm)  LVOT VTI:                14.47 cm  LVOT Diameter:           2.14 cm  (1.8-2.4cm)  AoV Area, VTI:           2.24 cm2 (2.5-5.5cm2)  AoV Area,Vmax:           2.66 cm2 (2.5-4.5cm2)  AoV Dimensionless Index: 0.62    RIGHT VENTRICLE:  RV 1   3.8 cm  RV 2   3.3 cm  RV 3   7.4 cm  TAPSE: 20.0 mm  RV s'  0.10 m/s    TRICUSPID VALVE/RVSP:  Normal Ranges:  Peak TR Velocity: 2.90 m/s  RV Syst Pressure: 36.6 mmHg (< 30mmHg)  IVC Diam:         2.10 cm    AORTA:  Asc Ao Diam 3.83  cm      [id number] Jewel Rodriguez MD  Electronically signed on 11/8/2022 at 12:24:29 PM      Chest CT Scan     No results found for this or any previous visit from the past 365 days.       Laboratory Studies     Lab Results   Component Value Date    WBC 8.7 11/01/2023    HGB 11.6 (L) 11/01/2023    HCT 38.7 11/01/2023    MCV 92 11/01/2023     11/01/2023      Lab Results   Component Value Date    GLUCOSE 163 (H) 11/01/2023    CALCIUM 9.3 11/01/2023     11/01/2023    K 4.1 11/01/2023    CO2 27 11/01/2023     11/01/2023    BUN 26 (H) 11/01/2023    CREATININE 1.24 (H) 11/01/2023      Lab Results   Component Value Date    ALT 10 11/01/2023    AST 13 11/01/2023    ALKPHOS 80 11/01/2023    BILITOT 0.5 11/01/2023        Protime   Date/Time Value Ref Range Status   10/25/2023 05:26 PM 33.8 (H) 9.8 - 12.8 seconds Final     INR   Date/Time Value Ref Range Status   10/25/2023 05:26 PM 3.0 (H) 0.9 - 1.1 Final       Assessment and Plan / Recommendations     Ms. Perry is a pleasant 83-year-old female with:    Chronic dyspnea on exertion - likely multifactorial in the setting of:  Deconditioning; Morbid obesity, LE edema/venous insufficiency, Osteoarthritis   Chronic Afib and HFpEF  COPD  Mild anemia    I reviewed her most recent CXR (August 2023), CT scan of chest (2013), and Echocardiogram (2022). No clear parenchymal lung disease noted on previous chest imaging. Lungs were clear on auscultation today.     Recommend:  -- CT scan of chest w/o Contrast  -- Complete PFTs with pre- and post bronchodilators spirometry  -- Use Albuterol as needed for now  -- Regular exercises  -- F/U in 1-2 months to review work up results.       Please excuse any misspellings or unintended errors related to the Dragon speech recognition software used to dictate this note.    Sharad Soto MD    01/04/2024

## 2024-01-15 ENCOUNTER — OFFICE VISIT (OUTPATIENT)
Dept: OPHTHALMOLOGY | Facility: CLINIC | Age: 84
End: 2024-01-15
Payer: MEDICARE

## 2024-01-15 ENCOUNTER — HOSPITAL ENCOUNTER (OUTPATIENT)
Dept: RADIOLOGY | Facility: HOSPITAL | Age: 84
Discharge: HOME | End: 2024-01-15
Payer: MEDICARE

## 2024-01-15 DIAGNOSIS — E11.9 CONTROLLED TYPE 2 DIABETES MELLITUS WITHOUT COMPLICATION, WITHOUT LONG-TERM CURRENT USE OF INSULIN (MULTI): Primary | ICD-10-CM

## 2024-01-15 DIAGNOSIS — Z96.1 PSEUDOPHAKIA OF BOTH EYES: ICD-10-CM

## 2024-01-15 DIAGNOSIS — H57.04 PERSISTENT MYDRIASIS: ICD-10-CM

## 2024-01-15 DIAGNOSIS — R06.09 DYSPNEA ON EXERTION: ICD-10-CM

## 2024-01-15 DIAGNOSIS — H35.3190 AGE-RELATED MACULAR DEGENERATION WITH CENTRAL GEOGRAPHIC ATROPHY: ICD-10-CM

## 2024-01-15 PROCEDURE — 71250 CT THORAX DX C-: CPT | Performed by: RADIOLOGY

## 2024-01-15 PROCEDURE — 99214 OFFICE O/P EST MOD 30 MIN: CPT | Performed by: OPHTHALMOLOGY

## 2024-01-15 PROCEDURE — 92134 CPTRZ OPH DX IMG PST SGM RTA: CPT | Mod: BILATERAL PROCEDURE | Performed by: OPHTHALMOLOGY

## 2024-01-15 PROCEDURE — 71250 CT THORAX DX C-: CPT

## 2024-01-15 ASSESSMENT — CONF VISUAL FIELD
OS_INFERIOR_NASAL_RESTRICTION: 0
OD_INFERIOR_NASAL_RESTRICTION: 0
OS_INFERIOR_TEMPORAL_RESTRICTION: 0
OD_SUPERIOR_NASAL_RESTRICTION: 0
OD_SUPERIOR_TEMPORAL_RESTRICTION: 0
OS_NORMAL: 1
OD_NORMAL: 1
METHOD: COUNTING FINGERS
OS_SUPERIOR_TEMPORAL_RESTRICTION: 0
OD_INFERIOR_TEMPORAL_RESTRICTION: 0
OS_SUPERIOR_NASAL_RESTRICTION: 0

## 2024-01-15 ASSESSMENT — REFRACTION_MANIFEST
OS_SPHERE: -0.25
OD_CYLINDER: -4.25
OD_ADD: +2.50
OS_CYLINDER: -3.50
OD_AXIS: 080
OS_ADD: +2.50
METHOD_AUTOREFRACTION: 1
OS_CYLINDER: -3.50
OS_AXIS: 082
OD_SPHERE: +2.00
OD_SPHERE: +1.00
OD_CYLINDER: -2.50
OD_ADD: +2.75
OD_AXIS: 080
OS_SPHERE: -0.50
OS_ADD: +2.75
OS_AXIS: 082

## 2024-01-15 ASSESSMENT — REFRACTION_WEARINGRX
OS_SPHERE: +0.25
SPECS_TYPE: LINE BI-FOCAL
OD_CYLINDER: -3.50
OD_ADD: +2.50
OS_AXIS: 080
OD_SPHERE: +1.25
OS_ADD: +2.50
OD_AXIS: 100
OS_CYLINDER: -3.75

## 2024-01-15 ASSESSMENT — CUP TO DISC RATIO
OD_RATIO: .3
OS_RATIO: .3

## 2024-01-15 ASSESSMENT — EXTERNAL EXAM - LEFT EYE: OS_EXAM: NORMAL

## 2024-01-15 ASSESSMENT — VISUAL ACUITY
METHOD: SNELLEN - LINEAR
OS_CC: 20/40-2
OS_CC: J1
OD_CC: J7
OD_CC: 20/60-1
CORRECTION_TYPE: GLASSES

## 2024-01-15 ASSESSMENT — PACHYMETRY
OS_CT(UM): 609
OD_CT(UM): 599

## 2024-01-15 ASSESSMENT — SLIT LAMP EXAM - LIDS
COMMENTS: 3+ DERMATOCHALASIS
COMMENTS: 3+ DERMATOCHALASIS

## 2024-01-15 ASSESSMENT — EXTERNAL EXAM - RIGHT EYE: OD_EXAM: NORMAL

## 2024-01-15 ASSESSMENT — TONOMETRY
OS_IOP_MMHG: 13
OD_IOP_MMHG: 12
IOP_METHOD: TONOPEN

## 2024-01-15 ASSESSMENT — ENCOUNTER SYMPTOMS: EYES NEGATIVE: 1

## 2024-01-15 NOTE — PROGRESS NOTES
Diabetes mellitus without ophthalmic manifestations~E11.9   No DR OU   Encouraged good blood sugar and blood pressure control         Persistent mydriasis~H57.04   Stable   Pt is asymptomatic          Dermatochalasis of right upper eyelid~H02.831   Dermatochalasis of left upper eyelid~H02.834  Evaluated previously by Dr. 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

## 2024-01-24 ENCOUNTER — LAB (OUTPATIENT)
Dept: LAB | Facility: LAB | Age: 84
End: 2024-01-24
Payer: MEDICARE

## 2024-01-24 ENCOUNTER — OFFICE VISIT (OUTPATIENT)
Dept: PRIMARY CARE | Facility: CLINIC | Age: 84
End: 2024-01-24
Payer: MEDICARE

## 2024-01-24 VITALS
SYSTOLIC BLOOD PRESSURE: 138 MMHG | HEIGHT: 68 IN | WEIGHT: 255.5 LBS | DIASTOLIC BLOOD PRESSURE: 62 MMHG | RESPIRATION RATE: 16 BRPM | OXYGEN SATURATION: 97 % | TEMPERATURE: 97.3 F | BODY MASS INDEX: 38.72 KG/M2 | HEART RATE: 82 BPM

## 2024-01-24 DIAGNOSIS — E11.9 CONTROLLED TYPE 2 DIABETES MELLITUS WITHOUT COMPLICATION, WITHOUT LONG-TERM CURRENT USE OF INSULIN (MULTI): ICD-10-CM

## 2024-01-24 DIAGNOSIS — R06.2 WHEEZING: ICD-10-CM

## 2024-01-24 DIAGNOSIS — I10 PRIMARY HYPERTENSION: Primary | ICD-10-CM

## 2024-01-24 DIAGNOSIS — E53.8 VITAMIN B 12 DEFICIENCY: ICD-10-CM

## 2024-01-24 DIAGNOSIS — R05.1 ACUTE COUGH: ICD-10-CM

## 2024-01-24 DIAGNOSIS — I48.91 ATRIAL FIBRILLATION, UNSPECIFIED TYPE (MULTI): ICD-10-CM

## 2024-01-24 DIAGNOSIS — M10.9 GOUT, UNSPECIFIED CAUSE, UNSPECIFIED CHRONICITY, UNSPECIFIED SITE: ICD-10-CM

## 2024-01-24 DIAGNOSIS — N18.32 STAGE 3B CHRONIC KIDNEY DISEASE (MULTI): ICD-10-CM

## 2024-01-24 DIAGNOSIS — E03.9 HYPOTHYROIDISM, UNSPECIFIED TYPE: ICD-10-CM

## 2024-01-24 DIAGNOSIS — G47.33 SLEEP APNEA, OBSTRUCTIVE: ICD-10-CM

## 2024-01-24 LAB
ALBUMIN SERPL BCP-MCNC: 4.1 G/DL (ref 3.4–5)
ALP SERPL-CCNC: 76 U/L (ref 33–136)
ALT SERPL W P-5'-P-CCNC: 13 U/L (ref 7–45)
ANION GAP SERPL CALC-SCNC: 15 MMOL/L (ref 10–20)
AST SERPL W P-5'-P-CCNC: 16 U/L (ref 9–39)
BILIRUB SERPL-MCNC: 0.7 MG/DL (ref 0–1.2)
BUN SERPL-MCNC: 24 MG/DL (ref 6–23)
CALCIUM SERPL-MCNC: 9.3 MG/DL (ref 8.6–10.3)
CHLORIDE SERPL-SCNC: 100 MMOL/L (ref 98–107)
CO2 SERPL-SCNC: 27 MMOL/L (ref 21–32)
CREAT SERPL-MCNC: 1.41 MG/DL (ref 0.5–1.05)
EGFRCR SERPLBLD CKD-EPI 2021: 37 ML/MIN/1.73M*2
ERYTHROCYTE [DISTWIDTH] IN BLOOD BY AUTOMATED COUNT: 17 % (ref 11.5–14.5)
GLUCOSE SERPL-MCNC: 200 MG/DL (ref 74–99)
HCT VFR BLD AUTO: 38.8 % (ref 36–46)
HGB BLD-MCNC: 11.8 G/DL (ref 12–16)
INR PPP: 3 (ref 0.9–1.1)
MCH RBC QN AUTO: 27.5 PG (ref 26–34)
MCHC RBC AUTO-ENTMCNC: 30.4 G/DL (ref 32–36)
MCV RBC AUTO: 90 FL (ref 80–100)
NRBC BLD-RTO: 0 /100 WBCS (ref 0–0)
PLATELET # BLD AUTO: 209 X10*3/UL (ref 150–450)
POTASSIUM SERPL-SCNC: 4.5 MMOL/L (ref 3.5–5.3)
PROT SERPL-MCNC: 6.7 G/DL (ref 6.4–8.2)
PROTHROMBIN TIME: 34.6 SECONDS (ref 9.8–12.8)
RBC # BLD AUTO: 4.29 X10*6/UL (ref 4–5.2)
SODIUM SERPL-SCNC: 137 MMOL/L (ref 136–145)
URATE SERPL-MCNC: 6.6 MG/DL (ref 2.3–6.7)
VIT B12 SERPL-MCNC: 1008 PG/ML (ref 211–911)
WBC # BLD AUTO: 6.4 X10*3/UL (ref 4.4–11.3)

## 2024-01-24 PROCEDURE — 84550 ASSAY OF BLOOD/URIC ACID: CPT

## 2024-01-24 PROCEDURE — 1159F MED LIST DOCD IN RCRD: CPT | Performed by: NURSE PRACTITIONER

## 2024-01-24 PROCEDURE — 82607 VITAMIN B-12: CPT

## 2024-01-24 PROCEDURE — 85610 PROTHROMBIN TIME: CPT

## 2024-01-24 PROCEDURE — 1126F AMNT PAIN NOTED NONE PRSNT: CPT | Performed by: NURSE PRACTITIONER

## 2024-01-24 PROCEDURE — 36415 COLL VENOUS BLD VENIPUNCTURE: CPT

## 2024-01-24 PROCEDURE — 99213 OFFICE O/P EST LOW 20 MIN: CPT | Performed by: NURSE PRACTITIONER

## 2024-01-24 PROCEDURE — 85027 COMPLETE CBC AUTOMATED: CPT

## 2024-01-24 PROCEDURE — 1160F RVW MEDS BY RX/DR IN RCRD: CPT | Performed by: NURSE PRACTITIONER

## 2024-01-24 PROCEDURE — 80053 COMPREHEN METABOLIC PANEL: CPT

## 2024-01-24 PROCEDURE — 3078F DIAST BP <80 MM HG: CPT | Performed by: NURSE PRACTITIONER

## 2024-01-24 PROCEDURE — 3075F SYST BP GE 130 - 139MM HG: CPT | Performed by: NURSE PRACTITIONER

## 2024-01-24 PROCEDURE — 1036F TOBACCO NON-USER: CPT | Performed by: NURSE PRACTITIONER

## 2024-01-24 PROCEDURE — 87637 SARSCOV2&INF A&B&RSV AMP PRB: CPT

## 2024-01-24 PROCEDURE — 1157F ADVNC CARE PLAN IN RCRD: CPT | Performed by: NURSE PRACTITIONER

## 2024-01-24 RX ORDER — AZITHROMYCIN 250 MG/1
TABLET, FILM COATED ORAL
Qty: 6 TABLET | Refills: 0 | Status: SHIPPED | OUTPATIENT
Start: 2024-01-24 | End: 2024-01-26 | Stop reason: ALTCHOICE

## 2024-01-24 RX ORDER — PREDNISONE 20 MG/1
40 TABLET ORAL DAILY
Qty: 10 TABLET | Refills: 0 | Status: SHIPPED | OUTPATIENT
Start: 2024-01-24 | End: 2024-01-29

## 2024-01-24 ASSESSMENT — ENCOUNTER SYMPTOMS
SINUS PRESSURE: 1
SINUS PAIN: 1
COUGH: 1

## 2024-01-24 NOTE — PROGRESS NOTES
"Subjective   Promise Perry is a 83 y.o. female who presents for Sick Visit.  Patient stated feeling sick since last Wednesday,had no fever, patient stated had pneumonia in the past has been a while. Reports she needs to update.  Patient stated has Covid and RSV vaccine. 1-2 months ago got the vaccine   Patient stated it feels more of bronchitis than a sinus infection.  Sees Dr. Fraser for shortness of breath pulmonary.         Review of Systems   HENT:  Positive for congestion, sinus pressure and sinus pain.    Respiratory:  Positive for cough.    All other systems reviewed and are negative.      Objective   Physical Exam  Vitals reviewed.   HENT:      Head: Normocephalic.   Cardiovascular:      Rate and Rhythm: Normal rate and regular rhythm.      Pulses: Normal pulses.      Heart sounds: Normal heart sounds.   Pulmonary:      Effort: Pulmonary effort is normal.      Breath sounds: Examination of the right-upper field reveals wheezing. Examination of the left-upper field reveals wheezing. Examination of the right-middle field reveals wheezing. Examination of the left-middle field reveals wheezing. Examination of the right-lower field reveals wheezing. Examination of the left-lower field reveals wheezing. Wheezing present.   Neurological:      General: No focal deficit present.      Mental Status: She is alert and oriented to person, place, and time. Mental status is at baseline.       /62 (BP Location: Left arm, Patient Position: Sitting)   Pulse 82   Temp 36.3 °C (97.3 °F)   Resp 16   Ht 1.727 m (5' 8\")   Wt 116 kg (255 lb 8 oz)   SpO2 97%   BMI 38.85 kg/m²       Assessment/Plan   Problem List Items Addressed This Visit       Atrial fibrillation (CMS/HCC)     Takes coumadin due to afib          Controlled type 2 diabetes mellitus without complication, without long-term current use of insulin (CMS/[facility])    Hypertension - Primary     Bp today normal on lisinopril, hydralazine, torsemide         " Hypothyroidism     Thyroid cancer s/pthyroidectomy   On levothyroxine         Sleep apnea, obstructive     Using CPAP regularly           Other Visit Diagnoses       Acute cough        Relevant Medications    predniSONE (Deltasone) 20 mg tablet    Other Relevant Orders    CBC (Completed)    Sars-CoV-2 and Influenza A/B PCR (Completed)    RSV PCR (Completed)    Wheezing        Relevant Medications    predniSONE (Deltasone) 20 mg tablet    Other Relevant Orders    Sars-CoV-2 and Influenza A/B PCR (Completed)    RSV PCR (Completed)    Stage 3b chronic kidney disease (CMS/HCC)        Relevant Orders    Basic metabolic panel

## 2024-01-25 LAB
FLUAV RNA RESP QL NAA+PROBE: NOT DETECTED
FLUBV RNA RESP QL NAA+PROBE: NOT DETECTED
RSV RNA RESP QL NAA+PROBE: NOT DETECTED
SARS-COV-2 RNA RESP QL NAA+PROBE: NOT DETECTED

## 2024-01-26 ENCOUNTER — APPOINTMENT (OUTPATIENT)
Dept: RESPIRATORY THERAPY | Facility: HOSPITAL | Age: 84
End: 2024-01-26
Payer: MEDICARE

## 2024-01-26 ENCOUNTER — TELEPHONE (OUTPATIENT)
Dept: PRIMARY CARE | Facility: CLINIC | Age: 84
End: 2024-01-26
Payer: MEDICARE

## 2024-01-26 DIAGNOSIS — I48.11 LONGSTANDING PERSISTENT ATRIAL FIBRILLATION (MULTI): ICD-10-CM

## 2024-01-26 DIAGNOSIS — J44.9 CHRONIC OBSTRUCTIVE PULMONARY DISEASE, UNSPECIFIED COPD TYPE (MULTI): Primary | ICD-10-CM

## 2024-01-26 DIAGNOSIS — J44.9 CHRONIC OBSTRUCTIVE PULMONARY DISEASE, UNSPECIFIED COPD TYPE (MULTI): ICD-10-CM

## 2024-01-26 RX ORDER — DOXYCYCLINE 100 MG/1
100 CAPSULE ORAL 2 TIMES DAILY
Qty: 14 CAPSULE | Refills: 0 | OUTPATIENT
Start: 2024-01-26 | End: 2024-02-02

## 2024-01-26 RX ORDER — DOXYCYCLINE 100 MG/1
100 CAPSULE ORAL 2 TIMES DAILY
Qty: 14 CAPSULE | Refills: 0 | Status: SHIPPED | OUTPATIENT
Start: 2024-01-26 | End: 2024-02-13 | Stop reason: ALTCHOICE

## 2024-01-26 RX ORDER — DOXYCYCLINE 100 MG/1
100 CAPSULE ORAL 2 TIMES DAILY
Qty: 14 CAPSULE | Refills: 0 | Status: SHIPPED | OUTPATIENT
Start: 2024-01-26 | End: 2024-01-26 | Stop reason: SDUPTHER

## 2024-01-26 NOTE — TELEPHONE ENCOUNTER
Barby from [facility] called for verification and clarification for the  Z-Pack was prescribed on 1/23.    Patient has on record Macrolide and Erythromycin allergies.    Patient does not recall the reaction she had with the allergic reactions.    Pharmacy needs to know if this was the intended medication before filling.    Please call her at [phone]

## 2024-02-06 ENCOUNTER — TELEPHONE (OUTPATIENT)
Dept: PRIMARY CARE | Facility: CLINIC | Age: 84
End: 2024-02-06
Payer: MEDICARE

## 2024-02-06 DIAGNOSIS — N18.32 STAGE 3B CHRONIC KIDNEY DISEASE (MULTI): ICD-10-CM

## 2024-02-06 DIAGNOSIS — I48.91 ATRIAL FIBRILLATION, UNSPECIFIED TYPE (MULTI): Primary | ICD-10-CM

## 2024-02-06 DIAGNOSIS — D64.9 ANEMIA, UNSPECIFIED TYPE: ICD-10-CM

## 2024-02-06 DIAGNOSIS — E11.9 CONTROLLED TYPE 2 DIABETES MELLITUS WITHOUT COMPLICATION, WITHOUT LONG-TERM CURRENT USE OF INSULIN (MULTI): ICD-10-CM

## 2024-02-11 ASSESSMENT — ENCOUNTER SYMPTOMS
HYPERTENSION: 1
SWEATS: 1
SHORTNESS OF BREATH: 1

## 2024-02-13 ENCOUNTER — OFFICE VISIT (OUTPATIENT)
Dept: PRIMARY CARE | Facility: CLINIC | Age: 84
End: 2024-02-13
Payer: MEDICARE

## 2024-02-13 VITALS
HEART RATE: 99 BPM | BODY MASS INDEX: 39.86 KG/M2 | RESPIRATION RATE: 14 BRPM | OXYGEN SATURATION: 96 % | DIASTOLIC BLOOD PRESSURE: 72 MMHG | WEIGHT: 263 LBS | HEIGHT: 68 IN | SYSTOLIC BLOOD PRESSURE: 134 MMHG

## 2024-02-13 DIAGNOSIS — L25.9 CONTACT DERMATITIS, UNSPECIFIED CONTACT DERMATITIS TYPE, UNSPECIFIED TRIGGER: Primary | ICD-10-CM

## 2024-02-13 DIAGNOSIS — E11.9 CONTROLLED TYPE 2 DIABETES MELLITUS WITHOUT COMPLICATION, WITHOUT LONG-TERM CURRENT USE OF INSULIN (MULTI): ICD-10-CM

## 2024-02-13 DIAGNOSIS — I73.9 PERIPHERAL VASCULAR DISEASE OF FOOT (CMS-HCC): ICD-10-CM

## 2024-02-13 DIAGNOSIS — I50.30 HEART FAILURE WITH PRESERVED EJECTION FRACTION, UNSPECIFIED HF CHRONICITY (MULTI): ICD-10-CM

## 2024-02-13 DIAGNOSIS — E11.22 TYPE 2 DIABETES MELLITUS WITH CHRONIC KIDNEY DISEASE, WITHOUT LONG-TERM CURRENT USE OF INSULIN, UNSPECIFIED CKD STAGE (MULTI): ICD-10-CM

## 2024-02-13 LAB — POC HEMOGLOBIN A1C: 6.3 % (ref 4.2–6.5)

## 2024-02-13 PROCEDURE — 1036F TOBACCO NON-USER: CPT | Performed by: INTERNAL MEDICINE

## 2024-02-13 PROCEDURE — 3075F SYST BP GE 130 - 139MM HG: CPT | Performed by: INTERNAL MEDICINE

## 2024-02-13 PROCEDURE — 1157F ADVNC CARE PLAN IN RCRD: CPT | Performed by: INTERNAL MEDICINE

## 2024-02-13 PROCEDURE — 3078F DIAST BP <80 MM HG: CPT | Performed by: INTERNAL MEDICINE

## 2024-02-13 PROCEDURE — 1126F AMNT PAIN NOTED NONE PRSNT: CPT | Performed by: INTERNAL MEDICINE

## 2024-02-13 PROCEDURE — 83036 HEMOGLOBIN GLYCOSYLATED A1C: CPT | Performed by: INTERNAL MEDICINE

## 2024-02-13 PROCEDURE — 1160F RVW MEDS BY RX/DR IN RCRD: CPT | Performed by: INTERNAL MEDICINE

## 2024-02-13 PROCEDURE — 99214 OFFICE O/P EST MOD 30 MIN: CPT | Performed by: INTERNAL MEDICINE

## 2024-02-13 PROCEDURE — 1159F MED LIST DOCD IN RCRD: CPT | Performed by: INTERNAL MEDICINE

## 2024-02-13 RX ORDER — TRIAMCINOLONE ACETONIDE 1 MG/G
CREAM TOPICAL 2 TIMES DAILY
Qty: 80 G | Refills: 1 | Status: SHIPPED | OUTPATIENT
Start: 2024-02-13

## 2024-02-13 ASSESSMENT — ENCOUNTER SYMPTOMS
HYPERTENSION: 1
SWEATS: 1

## 2024-02-13 NOTE — PROGRESS NOTES
"Subjective   Promise Perry is a 83 y.o. female who presents for follow up       Hypertension  This is a chronic problem. The current episode started more than 1 year ago. The problem has been waxing and waning since onset. The problem is controlled. Associated symptoms include malaise/fatigue, peripheral edema and sweats. Pertinent negatives include no chest pain or shortness of breath. Risk factors for coronary artery disease include diabetes mellitus, family history, obesity and sedentary lifestyle. There are no compliance problems.         Review of Systems   Constitutional:  Positive for malaise/fatigue. Negative for chills, diaphoresis and fever.   Respiratory:  Negative for cough and shortness of breath.    Cardiovascular:  Negative for chest pain and leg swelling.   Gastrointestinal:  Negative for constipation, diarrhea, nausea and vomiting.   Musculoskeletal:  Negative for joint swelling and myalgias.   Skin:  Positive for rash.     TRIED TO MAKE APPT WITH DERMATOLOGIST, IN JUNE, HAVE A RASH NOT ITCHY ON DISTAL ARMS.    STARTED VOLUNTEERING IN [employer], PLAY WITH THE PUPPIES AND CATS.  THE ONLY NEW THING.  NO NEW MEDS.  NOT EEN ALLERGIC TO ANIMALS BEFORE    SAW NP FOR BRONCHITIS, TESTED FOR EVERYTHING COVID/RSV, AND EVERYTHING WAS NEGATIVE.  WAS GIVEN SCRIPT FOR PREDNISONE AND ANTIBIOTIC, BUT WRONG ANTIBIOTIC.  FINALLY GOT THE CORRECT ABX, BUT BY THAT TIME FELT BETTER, SO DIDN'T TAKE THE DOXYCYCLINE.    DR. MCGARRY DID CT SCAN OF LUNGS, PLAN SUPPOSEDLY FOR FOLLOW UP IN 3 MONTHS TO TRACK POTENTIAL INFECTIOUS OR INFLAMMATORY NODULES      Objective   /72   Pulse 99   Resp 14   Ht 1.727 m (5' 8\")   Wt 119 kg (263 lb)   SpO2 96%   BMI 39.99 kg/m²     Physical Exam  Vitals reviewed.   Constitutional:       General: She is not in acute distress.     Appearance: She is obese. She is not ill-appearing.   Cardiovascular:      Rate and Rhythm: Normal rate and regular rhythm.      Pulses: Normal pulses. "      Heart sounds:      No gallop.   Pulmonary:      Breath sounds: Normal breath sounds. No wheezing, rhonchi or rales.   Abdominal:      General: Abdomen is flat. Bowel sounds are normal.      Palpations: Abdomen is soft.      Tenderness: There is no guarding or rebound.   Musculoskeletal:      Right lower leg: No edema.      Left lower leg: No edema.      Comments: Left arthroplasty incision is closed and dry  THERE IS A TOPHUS ON THE MEDIAL ASPECT OF LEFT 3RD PIP OF HAND  RED AND TENDER BILATERAL 1ST AND 5TH MTP JOINTS   Skin:     Findings: Rash (ERYTHEMATOUS PAPULAR RASH LMITED ONLY TO DISTAL ARMS/FOREARMS, NOT PRESENT ANYWHERE ELSE) present.         Assessment/Plan   Problem List Items Addressed This Visit       (HFpEF) heart failure with preserved ejection fraction (CMS/[facility])     STABLE WITH CURRENT MEDS         Controlled type 2 diabetes mellitus without complication, without long-term current use of insulin (CMS/[facility])    Relevant Orders    POCT glycosylated hemoglobin (Hb A1C) manually resulted    Peripheral vascular disease of foot (CMS/[facility])     STABLE AT PRESENT         Type 2 diabetes mellitus with chronic kidney disease, without long-term current use of insulin, unspecified CKD stage (CMS/[facility])     MONITOR WITH LABS          Other Visit Diagnoses       Contact dermatitis, unspecified contact dermatitis type, unspecified trigger    -  Primary    Relevant Medications    triamcinolone (Kenalog) 0.1 % cream          Patient Instructions    CT SCAN CHEST REVIEWED HAS A FEW SMALL NODULES THAT WILL NEED FOLLOW UP WITH FUTURE CT AS DIRECTED BY DR. MCGARRY.  THERE WERE COMMENT ABOUT A FEW KIDNEY CYSTS THAT COULD BE FOLLOWED UP IN THE SAME SCAN PER DR. MCGARRY    2.  A1C TODAY IS 6.3% = EXCELLENT DIABETIC CONTROL, CONTINUE PRESENT MEDS    3.  TRIAMCINOLONE CREAM TO BE APPLIED TO THE ARM RASH AS NEEDED.  PLEASE TRY EFFORTS TO AVOID CONTACT WITH THE ANIMALS AT THE SHELTER, IT IS POSSIBLE YOU ARE REACTING TO SOMETHING FROM THE  SHELTER ON THEIR FUR RATHER THAN THE ANIMALS THEMSELVES    4.  LABS INCLUDING PT/INR ARE ORDERED FOR 3RD WEEK IN FEBRUARY    5.  FOLLOW UP 3 MONTHS OR AS NEEDED.    6.  PLEASE CALL IF REFILLS ARE NEEDED.    7.  VITAMIN B12 LEVEL CAME UP NICELY WITH B12 PILL, YOU CAN REDUCE YOUR B12 SUPPLEMENT  MICROGRAM DAILY    8.  URIC ACID CAME DOWN FROM 8.8 TO 6.6 WITH LOWER DOSE ALLOPURINOL, RECOMMEND CONTINUE CURRENT DOSE ALLOPURINOL FOR NOW

## 2024-02-13 NOTE — PATIENT INSTRUCTIONS
CT SCAN CHEST REVIEWED HAS A FEW SMALL NODULES THAT WILL NEED FOLLOW UP WITH FUTURE CT AS DIRECTED BY DR. MCGARRY.  THERE WERE COMMENT ABOUT A FEW KIDNEY CYSTS THAT COULD BE FOLLOWED UP IN THE SAME SCAN PER DR. MCGARRY    2.  A1C TODAY IS 6.3% = EXCELLENT DIABETIC CONTROL, CONTINUE PRESENT MEDS    3.  TRIAMCINOLONE CREAM TO BE APPLIED TO THE ARM RASH AS NEEDED.  PLEASE TRY EFFORTS TO AVOID CONTACT WITH THE ANIMALS AT THE SHELTER, IT IS POSSIBLE YOU ARE REACTING TO SOMETHING FROM THE SHELTER ON THEIR FUR RATHER THAN THE ANIMALS THEMSELVES    4.  LABS INCLUDING PT/INR ARE ORDERED FOR 3RD WEEK IN FEBRUARY    5.  FOLLOW UP 3 MONTHS OR AS NEEDED.    6.  PLEASE CALL IF REFILLS ARE NEEDED.    7.  VITAMIN B12 LEVEL CAME UP NICELY WITH B12 PILL, YOU CAN REDUCE YOUR B12 SUPPLEMENT  MICROGRAM DAILY    8.  URIC ACID CAME DOWN FROM 8.8 TO 6.6 WITH LOWER DOSE ALLOPURINOL, RECOMMEND CONTINUE CURRENT DOSE ALLOPURINOL FOR NOW

## 2024-02-15 PROBLEM — C73 THYROID CANCER (MULTI): Status: RESOLVED | Noted: 2023-06-21 | Resolved: 2024-02-15

## 2024-02-15 PROBLEM — E11.22 TYPE 2 DIABETES MELLITUS WITH CHRONIC KIDNEY DISEASE, WITHOUT LONG-TERM CURRENT USE OF INSULIN, UNSPECIFIED CKD STAGE (MULTI): Status: ACTIVE | Noted: 2024-02-15

## 2024-02-15 PROBLEM — I73.9 PERIPHERAL VASCULAR DISEASE OF FOOT (CMS-HCC): Status: ACTIVE | Noted: 2024-02-15

## 2024-02-15 ASSESSMENT — ENCOUNTER SYMPTOMS
SHORTNESS OF BREATH: 0
FEVER: 0
DIAPHORESIS: 0
NAUSEA: 0
JOINT SWELLING: 0
VOMITING: 0
CONSTIPATION: 0
CHILLS: 0
COUGH: 0
MYALGIAS: 0
DIARRHEA: 0

## 2024-02-23 ENCOUNTER — HOSPITAL ENCOUNTER (OUTPATIENT)
Dept: RESPIRATORY THERAPY | Facility: HOSPITAL | Age: 84
Discharge: HOME | End: 2024-02-23
Payer: MEDICARE

## 2024-02-23 DIAGNOSIS — R06.09 DYSPNEA ON EXERTION: ICD-10-CM

## 2024-02-23 PROCEDURE — 94726 PLETHYSMOGRAPHY LUNG VOLUMES: CPT

## 2024-02-23 PROCEDURE — 94729 DIFFUSING CAPACITY: CPT | Performed by: INTERNAL MEDICINE

## 2024-02-23 PROCEDURE — 94060 EVALUATION OF WHEEZING: CPT | Performed by: INTERNAL MEDICINE

## 2024-02-23 PROCEDURE — 94726 PLETHYSMOGRAPHY LUNG VOLUMES: CPT | Performed by: INTERNAL MEDICINE

## 2024-02-25 LAB
MGC ASCENT PFT - FEV1 - POST: 1.33
MGC ASCENT PFT - FEV1 - PRE: 1.31
MGC ASCENT PFT - FEV1 - PREDICTED: 2.19
MGC ASCENT PFT - FVC - POST: 2.09
MGC ASCENT PFT - FVC - PRE: 1.93
MGC ASCENT PFT - FVC - PREDICTED: 2.94

## 2024-03-18 ENCOUNTER — OFFICE VISIT (OUTPATIENT)
Dept: PULMONOLOGY | Facility: CLINIC | Age: 84
End: 2024-03-18
Payer: MEDICARE

## 2024-03-18 VITALS
BODY MASS INDEX: 38.19 KG/M2 | RESPIRATION RATE: 16 BRPM | WEIGHT: 252 LBS | HEART RATE: 99 BPM | TEMPERATURE: 97.5 F | SYSTOLIC BLOOD PRESSURE: 123 MMHG | DIASTOLIC BLOOD PRESSURE: 73 MMHG | HEIGHT: 68 IN | OXYGEN SATURATION: 95 %

## 2024-03-18 DIAGNOSIS — R91.8 LUNG NODULES: Primary | ICD-10-CM

## 2024-03-18 DIAGNOSIS — R09.82 POST-NASAL DRIP: ICD-10-CM

## 2024-03-18 DIAGNOSIS — R05.3 CHRONIC COUGH: ICD-10-CM

## 2024-03-18 PROCEDURE — 1157F ADVNC CARE PLAN IN RCRD: CPT | Performed by: INTERNAL MEDICINE

## 2024-03-18 PROCEDURE — 3074F SYST BP LT 130 MM HG: CPT | Performed by: INTERNAL MEDICINE

## 2024-03-18 PROCEDURE — 1036F TOBACCO NON-USER: CPT | Performed by: INTERNAL MEDICINE

## 2024-03-18 PROCEDURE — 99214 OFFICE O/P EST MOD 30 MIN: CPT | Performed by: INTERNAL MEDICINE

## 2024-03-18 PROCEDURE — 3078F DIAST BP <80 MM HG: CPT | Performed by: INTERNAL MEDICINE

## 2024-03-18 PROCEDURE — 1159F MED LIST DOCD IN RCRD: CPT | Performed by: INTERNAL MEDICINE

## 2024-03-18 PROCEDURE — 1160F RVW MEDS BY RX/DR IN RCRD: CPT | Performed by: INTERNAL MEDICINE

## 2024-03-18 RX ORDER — FLUTICASONE PROPIONATE 50 MCG
2 SPRAY, SUSPENSION (ML) NASAL DAILY
Qty: 16 G | Refills: 2 | Status: SHIPPED | OUTPATIENT
Start: 2024-03-18 | End: 2024-03-18 | Stop reason: SDUPTHER

## 2024-03-18 RX ORDER — FLUTICASONE PROPIONATE 50 MCG
2 SPRAY, SUSPENSION (ML) NASAL DAILY
Qty: 16 G | Refills: 2 | Status: SHIPPED | OUTPATIENT
Start: 2024-03-18 | End: 2024-09-14

## 2024-03-18 RX ORDER — GUAIFENESIN 600 MG/1
1200 TABLET, EXTENDED RELEASE ORAL 2 TIMES DAILY
Qty: 60 TABLET | Refills: 1 | Status: SHIPPED | OUTPATIENT
Start: 2024-03-18 | End: 2024-03-18 | Stop reason: SDUPTHER

## 2024-03-18 RX ORDER — GUAIFENESIN 600 MG/1
1200 TABLET, EXTENDED RELEASE ORAL 2 TIMES DAILY
Qty: 60 TABLET | Refills: 1 | Status: SHIPPED | OUTPATIENT
Start: 2024-03-18 | End: 2024-05-30 | Stop reason: ALTCHOICE

## 2024-03-18 NOTE — PROGRESS NOTES
Department of Medicine  Division of Pulmonary, Critical Care, and Sleep Medicine  Consultation  [facility] - [address], [address]    I was asked by Dr. Patino, to evaluate Ms. Promise Perry for chronic dyspnea on exertion. I have independently interviewed and examined the patient in the office and reviewed available records.    Physician HPI:  Ms. Perry is a pleasant 83-year-old female with past medical history significant for chronic atrial fibrillation on Coumadin, HFpEF, obesity, reported history of COPD, lower extremity venous insufficiency and obstructive sleep apnea on CPAP who presented to the office today for evaluation of chronic dyspnea on exertion.    Promise reports shortness of breath with mild to moderate exertions around the home over the past year.  She described it as a feeling that chest is heavy and she is running out of air that she had to stop.  It would occur after walking for short distance such as walking from the parking garage to the office today.  Shortness of breath is not associated with chest pain, palpitation or diaphoresis.  She reports intermittent wheezing mainly in the morning with clear sputum production.  No orthopnea or PND.  She denies shortness of breath at rest.  She has had an excellent compliance with CPAP for sleep apnea at night.  She is following up with vascular medicine for lower extremity venous insufficiency.  She underwent total knee replacement in August 2023 for severe osteoarthritis.  She reports potential need of total knee replacement in the right knee as well in the near future.  She used to follow-up with pulmonary clinic at [facility] where she was told to have asthma and COPD as she reports.  Records were not available to review today.    Promise denies past history of asthma in childhood or recurrent sinopulmonary infections.  She is a former smoker of half a pack of day of cigarettes between age [age].  No high risk occupational exposures otherwise.  No  family history of chronic pulmonary disease.  She is to have a dog at home.  No birds exposures or farm work.    Follow up 03/18/2024:  Promise presented to the office today for follow-up.  Reports chronic cough with signs of postnasal drip.   CT scan of chest revealed multiple nodular opacities. She reports having acute bronchitis around the time CT was done. No acute fevers now or purulent sputum production.     PFTs - no airflow obstruction. No restriction with TLC 87% of predicted.  DLCO 94% of predicted.  Immunizations:  Immunization History   Administered Date(s) Administered    Flu vaccine, quadrivalent, high-dose, preservative free, age 65y+ (FLUZONE) 10/04/2021, 10/05/2023    Influenza, High Dose Seasonal, Preservative Free 12/06/2016, 10/18/2017, 09/07/2018    Influenza, Seasonal, Quadrivalent, Adjuvanted 11/17/2022    Influenza, Unspecified 09/09/2010, 10/08/2012, 09/09/2013, 09/08/2014    Influenza, seasonal, injectable 11/01/2015, 12/01/2016, 09/01/2017    Influenza, seasonal, injectable, preservative free 11/21/2015, 09/16/2020    Influenza, trivalent, adjuvanted 09/10/2019    Pfizer COVID-19 vaccine, Fall 2023, 12 years and older, (30mcg/0.3mL) 10/05/2023    Pfizer COVID-19 vaccine, bivalent, age 12 years and older (30 mcg/0.3 mL) 11/17/2022    Pfizer Gray Cap SARS-CoV-2 04/01/2022    Pfizer Purple Cap SARS-CoV-2 02/02/2021, 02/23/2021, 11/03/2021    Pneumococcal conjugate vaccine, 13-valent (PREVNAR 13) 09/10/2019    Pneumococcal polysaccharide vaccine, 23-valent, age 2 years and older (PNEUMOVAX 23) 09/16/2020    RSV, 60 Years And Older (AREXVY) 10/05/2023       Past Medical History:  Past Medical History:   Diagnosis Date    Arthritis     Asthma     CHF (congestive heart failure) (CMS/[facility])     Chronic kidney disease, unspecified 07/21/2013    Chronic kidney disease    Chronic obstructive pulmonary disease, unspecified (CMS/[facility]) 04/16/2018    Chronic obstructive pulmonary disease    Chronic  obstructive pulmonary disease, unspecified (CMS/[facility]) 06/18/2018    Chronic obstructive pulmonary disease    Chronic venous hypertension (idiopathic) with other complications of unspecified lower extremity 04/16/2018    Chronic venous hypertension with complication    Chronic venous hypertension (idiopathic) with other complications of unspecified lower extremity 06/18/2018    Chronic venous hypertension with complication    Diabetes mellitus (CMS/[facility])     Diverticulosis of intestine, part unspecified, without perforation or abscess without bleeding 07/21/2013    Diverticulosis    Edema, unspecified 04/16/2018    Edema    Edema, unspecified 06/18/2018    Edema    Essential (primary) hypertension 12/17/2022    Hypertension    Essential (primary) hypertension 07/21/2013    Hypertension    Gastro-esophageal reflux disease without esophagitis 07/21/2013    Esophageal reflux    Gastrointestinal hemorrhage, unspecified 07/21/2013    Gastrointestinal bleeding    Hyperlipidemia, unspecified 07/21/2013    Hyperlipidemia    Hypothyroidism, unspecified 04/16/2018    Hypothyroidism    Hypothyroidism, unspecified 06/18/2018    Hypothyroidism    Malignant neoplasm of thyroid gland (CMS/[facility]) 12/16/2022    Thyroid cancer    Obesity     Obstructive sleep apnea (adult) (pediatric) 04/16/2018    Sleep apnea, obstructive    Obstructive sleep apnea (adult) (pediatric) 06/18/2018    Sleep apnea, obstructive    Other disorders of electrolyte and fluid balance, not elsewhere classified 04/16/2018    Electrolyte and fluid disorder    Other disorders of electrolyte and fluid balance, not elsewhere classified 06/18/2018    Electrolyte and fluid disorder    Other fatigue 04/16/2018    Fatigue    Other fatigue 06/18/2018    Fatigue    Other forms of dyspnea 04/16/2018    Dyspnea on effort    Other forms of dyspnea 06/18/2018    Dyspnea on effort    Pain in unspecified knee 06/18/2018    Joint pain, knee    Personal history of other specified  conditions 04/10/2017    History of fatigue    Pneumonia     Unspecified abdominal hernia without obstruction or gangrene     Hernia    Unspecified atrial fibrillation (CMS/[facility]) 2022    Atrial fibrillation    Unspecified diastolic (congestive) heart failure (CMS/[facility]) 10/18/2022    (HFpEF) heart failure with preserved ejection fraction    Unspecified osteoarthritis, unspecified site 2018    Arthritis    Unspecified osteoarthritis, unspecified site 2018    Arthritis       Past Surgical History:  Past Surgical History:   Procedure Laterality Date    CATARACT EXTRACTION  2018    Cataract Extraction    COLONOSCOPY  2014    Complete Colonoscopy    CT ANGIO CORONARY ART WITH HEARTFLOW IF SCORE >30%  08/15/2017    CT HEART CORONARY ANGIOGRAM 8/15/2017 [facility] ANCILLARY LEGACY    ESOPHAGOGASTRODUODENOSCOPY  2014    Diagnostic Esophagogastroduodenoscopy    INCISIONAL BREAST BIOPSY  2013    Incisional Breast Biopsy    JOINT REPLACEMENT      TOTAL THYROIDECTOMY  2013    Thyroid Surgery Total Thyroidectomy       Family History:  Family History   Problem Relation Name Age of Onset    Hypertension Mother Esther Perry     Heart disease Mother Esther Perry     Cancer Father Pankaj Perry        Social History:  Social History     Tobacco Use    Smoking status: Former     Packs/day: 0.50     Years: 10.00     Additional pack years: 0.00     Total pack years: 5.00     Types: Cigarettes     Start date: 1957     Quit date: 1965     Years since quittin.7    Smokeless tobacco: Never   Substance Use Topics    Alcohol use: Yes     Alcohol/week: 2.0 standard drinks of alcohol     Types: 2 Glasses of wine per week     Comment: Occasionally on weekend    Drug use: Not Currently        Occupational & Environmental History:   Did office work. Retired recently.  No high risk occupational exposures.     Medications:  Current Outpatient Medications   Medication Instructions     acetaminophen (Tylenol) 325 mg tablet oral, Every 6 hours PRN    allopurinol (ZYLOPRIM) 200 mg, oral, Daily    AmaryL 1 mg tablet 1 tablet, oral, Daily    blood sugar diagnostic (OneTouch Verio test strips) strip Daily RT    celecoxib (CELEBREX) 200 mg, oral, Daily    citalopram (CELEXA) 20 mg, oral, Daily    colchicine 0.6 mg, oral, Daily    docusate sodium (Colace) 100 mg capsule oral, 2 times daily    exenatide microspheres (Bydureon BCise) 2 mg/0.85 mL auto-injector 1 Syringe, subcutaneous, Weekly, once a week    fluticasone (Flonase) 50 mcg/actuation nasal spray 2 sprays, Each Nostril, Daily, Shake gently. Before first use, prime pump. After use, clean tip and replace cap.    guaiFENesin (MUCINEX) 1,200 mg, oral, 2 times daily, Do not crush, chew, or split.    hydrALAZINE (APRESOLINE) 50 mg, oral, 2 times daily    levothyroxine (SYNTHROID, LEVOXYL) 150 mcg, oral, Daily, Take 1 tablet daily for 6 days then 1/2 tab on 7th day    lisinopril 20 mg tablet 1 tablet, oral, Daily    metFORMIN XR (Glucophage-XR) 500 mg 24 hr tablet 3 tablets, oral, Daily    metoprolol succinate XL (Toprol-XL) 50 mg 24 hr tablet oral    polyethylene glycol (GLYCOLAX, MIRALAX) 17 g, oral, 2 times daily    psyllium (Metamucil, sugar,) powder oral    simvastatin (ZOCOR) 40 mg, oral, Daily    torsemide (DEMADEX) 10 mg, oral, Daily    triamcinolone (Kenalog) 0.1 % cream Topical, 2 times daily, Apply to affected area 1-2 times daily as needed. Avoid face and groin.    verapamil SR (Calan-SR) 240 mg ER tablet 1 tablet, oral, Daily    warfarin (Coumadin) 5 mg tablet oral, 1 tab daily 1/2 tab sat none on sun         Drug Allergies/Intolerances:  Allergies   Allergen Reactions    Ceclor [Cefaclor] Unknown    Codeine Nausea Only    Lisinopril Other     severve hypotension    Losartan Unknown    Quinidine Unknown    Allopurinol Rash    Colchicine Rash    Erythromycin Rash        Visit Vitals  /73 (BP Location: Left arm, Patient Position:  "Sitting, BP Cuff Size: Large adult)   Pulse 99   Temp 36.4 °C (97.5 °F) (Temporal)   Resp 16   Ht 1.727 m (5' 8\")   Wt 114 kg (252 lb)   SpO2 95%   BMI 38.32 kg/m²   OB Status Postmenopausal   Smoking Status Former   BSA 2.34 m²        Physical Exam  Vitals and nursing note reviewed.   Constitutional:       General: She is not in acute distress.     Appearance: Normal appearance. She is obese.   HENT:      Head: Normocephalic and atraumatic.      Nose: Nose normal.      Mouth/Throat:      Mouth: Mucous membranes are moist.   Eyes:      Conjunctiva/sclera: Conjunctivae normal.   Cardiovascular:      Rate and Rhythm: Normal rate. Rhythm irregular.      Comments: LE edema bilaterally.   Pulmonary:      Effort: Pulmonary effort is normal. No respiratory distress.      Breath sounds: Normal breath sounds. No stridor. No wheezing or rhonchi.   Musculoskeletal:      Cervical back: Neck supple.   Skin:     General: Skin is warm and dry.      Coloration: Skin is not jaundiced.   Neurological:      General: No focal deficit present.      Mental Status: She is alert and oriented to person, place, and time. Mental status is at baseline.   Psychiatric:         Mood and Affect: Mood normal.         Behavior: Behavior normal.         Judgment: Judgment normal.            Chest Radiograph     XR chest 2 view 08/03/2023    Narrative  Interpreted By:  GUERDA WESLEY MD  MRN: [id number]  Patient Name: NAVIN CHUNG    STUDY:   CHEST 2 VIEW PA AND LAT;  8/3/2023 2:47 pm    INDICATION:  copd/chf; pre-op .    COMPARISON:  Chest radiograph 12/07/2018    ACCESSION NUMBER(S):  [id number]    ORDERING CLINICIAN:  LINSEY HOOKS    FINDINGS:  PA and lateral radiographs of the chest were provided.    DEVICES:  None    CARDIOMEDIASTINAL SILHOUETTE:  Cardiomediastinal silhouette is stable in size and  configuration.Atherosclerotic calcifications of the aortic arch.    LUNGS:  Mild bilateral interstitial thickening in the perihilar and mid " to  lower lung zones. Stable right-greater-than-left hilar prominence. No  pneumothorax. No pleural effusion. Left lower lobe subsegmental  atelectasis.    BONES:  No acute osseous changes.    Impression  1.  Findings compatible with vascular congestion.      Echocardiogram     Echocardiogram     Narrative  [facility], [address] [place] [address], [place] [address]  Tel [phone] and Fax [phone]    TRANSTHORACIC ECHOCARDIOGRAM REPORT      Patient Name:     NAVIN Glasgow Physician:   [id number] Jewel Rodriguez MD  Study Date:       11/8/2022       Referring Physician: MAKAYLA LOFTON  MRN/PID:          [id number]        PCP:  Accession/Order#: [id number]    Department Location: [facility]  YOB: 1940       Fellow:  Gender:           F               Nurse:  Admit Date:                       Sonographer:         France Levin [facility]  Admission Status: Outpatient      Additional Staff:  Height:           172.72 cm       CC Report to:  Weight:           122.47 kg       Study Type:          Echocardiogram  BSA:              2.32 m2  Blood Pressure: 119 /68 mmHg    Diagnosis/ICD: I50.30-Unspecified diastolic (congestive) heart failure (CHF)  Indication:    HFpEF, HASKINS  Procedure/CPT: Echo Complete w Full [id number]    Patient History:  Pertinent History: HFpEF, HASKINS, COPD, ANGI, chronic A-fib.    Study Detail: The following Echo studies were performed: 2D, M-Mode, Doppler and  color flow.      PHYSICIAN INTERPRETATION:  Left Ventricle: The left ventricular systolic function is normal, with an estimated ejection fraction of 55-60%. The patient is in atrial fibrillation which may influence the estimate of left ventricular function and transvalvular flows. There are no regional wall motion abnormalities. The left ventricular cavity size is normal. The left ventricular septal wall thickness is mildly increased. Left ventricular diastolic filling was indeterminate.  Left Atrium: The left atrium is  severely dilated.  Right Ventricle: The right ventricle is normal in size. There is normal right ventricular global systolic function.  Right Atrium: The right atrium is moderately to severely dilated.  Aortic Valve: The aortic valve is trileaflet. There is mild aortic valve cusp calcification. There is trivial aortic valve regurgitation. The peak instantaneous gradient of the aortic valve is 5.7 mmHg. The mean gradient of the aortic valve is 3.9 mmHg.  Mitral Valve: The mitral valve is normal in structure. There is mild mitral annular calcification. There is trace to mild mitral valve regurgitation.  Tricuspid Valve: The tricuspid valve is structurally normal. There is mild tricuspid regurgitation. The Doppler estimated RVSP is mildly elevated at 36.6 mmHg.  Pulmonic Valve: The pulmonic valve is structurally normal. There is physiologic pulmonic valve regurgitation.  Pericardium: There is a small pericardial effusion.  Aorta: The aortic root is normal. There is mild dilatation of the ascending aorta.  Systemic Veins: The inferior vena cava appears to be of normal size. There is IVC inspiratory collapse greater than 50%.  In comparison to the previous echocardiogram(s): Compared with study from 10/22/2021, no significant change.      CONCLUSIONS:  1. Left ventricular systolic function is normal with a 55-60% estimated ejection fraction.  2. The left atrium is severely dilated.  3. The right atrium is moderately to severely dilated.  4. Mildly elevated RVSP.  5. The patient is in atrial fibrillation which may influence the estimate of left ventricular function and transvalvular flows.    QUANTITATIVE DATA SUMMARY:  2D MEASUREMENTS:  Normal Ranges:  Ao Root d:     3.40 cm   (2.0-3.7cm)  LAs:           4.98 cm   (2.7-4.0cm)  IVSd:          1.18 cm   (0.6-1.1cm)  LVPWd:         0.99 cm   (0.6-1.1cm)  LVIDd:         5.04 cm   (3.9-5.9cm)  LVIDs:         2.91 cm  LV Mass Index: 88.7 g/m2  LV % FS        42.3 %    LA  VOLUME:  Normal Ranges:  LA Vol A4C:        133.6 ml   (22+/-6mL/m2)  LA Vol A2C:        135.4 ml  LA Vol BP:         139.2 ml  LA Vol Index A4C:  57.5ml/m2  LA Vol Index A2C:  58.3 ml/m2  LA Vol Index BP:   59.9 ml/m2  LA Area A4C:       33.4 cm2  LA Area A2C:       34.8 cm2  LA Major Axis A4C: 7.1 cm  LA Major Axis A2C: 7.6 cm  LA Vol A4C:        127.7 ml  LA Vol A2C:        127.5 ml    AORTA MEASUREMENTS:  Normal Ranges:  Asc Ao, d: 3.80 cm (2.1-3.4cm)    LV SYSTOLIC FUNCTION BY 2D PLANIMETRY (MOD):  Normal Ranges:  EF-A4C View: 55.7 % (>=55%)  EF-A2C View: 47.5 %  EF-Biplane:  52.1 %    LV DIASTOLIC FUNCTION:  Normal Ranges:  MV Peak E:  1.09 m/s (0.7-1.2 m/s)  MV e'       0.11 m/s (>8.0)  E/e' Ratio: 10.35    (<8.0)    AORTIC VALVE:  Normal Ranges:  AoV Vmax:                1.19 m/s (<=1.7m/s)  AoV Peak P.7 mmHg (<20mmHg)  AoV Mean PG:             3.9 mmHg (1.7-11.5mmHg)  LVOT Max Blane:            0.88 m/s (<=1.1m/s)  AoV VTI:                 23.25 cm (18-25cm)  LVOT VTI:                14.47 cm  LVOT Diameter:           2.14 cm  (1.8-2.4cm)  AoV Area, VTI:           2.24 cm2 (2.5-5.5cm2)  AoV Area,Vmax:           2.66 cm2 (2.5-4.5cm2)  AoV Dimensionless Index: 0.62    RIGHT VENTRICLE:  RV 1   3.8 cm  RV 2   3.3 cm  RV 3   7.4 cm  TAPSE: 20.0 mm  RV s'  0.10 m/s    TRICUSPID VALVE/RVSP:  Normal Ranges:  Peak TR Velocity: 2.90 m/s  RV Syst Pressure: 36.6 mmHg (< 30mmHg)  IVC Diam:         2.10 cm    AORTA:  Asc Ao Diam 3.83 cm      [id number] Jewel Rodriguez MD  Electronically signed on 2022 at 12:24:29 PM      Chest CT Scan     No results found for this or any previous visit from the past 365 days.       Laboratory Studies     Lab Results   Component Value Date    WBC 6.4 2024    HGB 11.8 (L) 2024    HCT 38.8 2024    MCV 90 2024     2024      Lab Results   Component Value Date    GLUCOSE 200 (H) 2024    CALCIUM 9.3 2024     2024    K 4.5  01/24/2024    CO2 27 01/24/2024     01/24/2024    BUN 24 (H) 01/24/2024    CREATININE 1.41 (H) 01/24/2024      Lab Results   Component Value Date    ALT 13 01/24/2024    AST 16 01/24/2024    ALKPHOS 76 01/24/2024    BILITOT 0.7 01/24/2024        Protime   Date/Time Value Ref Range Status   01/24/2024 03:39 PM 34.6 (H) 9.8 - 12.8 seconds Final     INR   Date/Time Value Ref Range Status   01/24/2024 03:39 PM 3.0 (H) 0.9 - 1.1 Final       Assessment and Plan / Recommendations     Ms. Perry is a pleasant 83-year-old female with:    Chronic dyspnea on exertion:  Most likely related to deconditioning in setting of morbid obesity, osteoarthritis and chronic lower extremity venous insufficiency.    2.   Lung nodules:  Felt to be inflammatory in nature with mucus impactions.  We discussed today to use Mucinex as needed.  Will repeat CT scan of chest in 3 months from the most recent scan to assure stability.    3.   Post nasal drip:  Use Flonase.    F/U after CT scan is done.       Please excuse any misspellings or unintended errors related to the Dragon speech recognition software used to dictate this note.    Sharad Soto MD

## 2024-03-23 DIAGNOSIS — E11.9 DIABETES MELLITUS TYPE 2 WITHOUT RETINOPATHY (MULTI): ICD-10-CM

## 2024-03-25 RX ORDER — EXENATIDE 2 MG/.85ML
INJECTION, SUSPENSION, EXTENDED RELEASE SUBCUTANEOUS
Qty: 10.2 ML | Refills: 3 | Status: SHIPPED | OUTPATIENT
Start: 2024-03-25

## 2024-03-30 ENCOUNTER — LAB REQUISITION (OUTPATIENT)
Dept: LAB | Facility: HOSPITAL | Age: 84
End: 2024-03-30
Payer: MEDICARE

## 2024-03-30 DIAGNOSIS — N39.0 URINARY TRACT INFECTION, SITE NOT SPECIFIED: ICD-10-CM

## 2024-03-30 PROCEDURE — 87086 URINE CULTURE/COLONY COUNT: CPT

## 2024-03-30 PROCEDURE — 87186 SC STD MICRODIL/AGAR DIL: CPT

## 2024-04-02 LAB — BACTERIA UR CULT: ABNORMAL

## 2024-04-04 ENCOUNTER — HOSPITAL ENCOUNTER (OUTPATIENT)
Dept: RADIOLOGY | Facility: HOSPITAL | Age: 84
Discharge: HOME | End: 2024-04-04
Payer: MEDICARE

## 2024-04-04 DIAGNOSIS — R91.8 LUNG NODULES: ICD-10-CM

## 2024-04-04 PROCEDURE — 71250 CT THORAX DX C-: CPT | Performed by: RADIOLOGY

## 2024-04-04 PROCEDURE — 71250 CT THORAX DX C-: CPT

## 2024-04-09 DIAGNOSIS — E11.9 CONTROLLED TYPE 2 DIABETES MELLITUS WITHOUT COMPLICATION, WITHOUT LONG-TERM CURRENT USE OF INSULIN (MULTI): Primary | ICD-10-CM

## 2024-04-09 RX ORDER — GLIMEPIRIDE 1 MG/1
1 TABLET ORAL DAILY
Qty: 90 TABLET | Refills: 3 | Status: SHIPPED | OUTPATIENT
Start: 2024-04-09 | End: 2025-04-09

## 2024-04-10 ENCOUNTER — OFFICE VISIT (OUTPATIENT)
Dept: CARDIOLOGY | Facility: CLINIC | Age: 84
End: 2024-04-10
Payer: MEDICARE

## 2024-04-10 VITALS
HEIGHT: 68 IN | OXYGEN SATURATION: 94 % | HEART RATE: 63 BPM | SYSTOLIC BLOOD PRESSURE: 112 MMHG | BODY MASS INDEX: 38.65 KG/M2 | DIASTOLIC BLOOD PRESSURE: 67 MMHG | WEIGHT: 255 LBS

## 2024-04-10 VITALS
DIASTOLIC BLOOD PRESSURE: 80 MMHG | BODY MASS INDEX: 38.49 KG/M2 | HEIGHT: 68 IN | SYSTOLIC BLOOD PRESSURE: 135 MMHG | WEIGHT: 254 LBS | RESPIRATION RATE: 16 BRPM | HEART RATE: 90 BPM

## 2024-04-10 DIAGNOSIS — R06.09 DYSPNEA ON EXERTION: ICD-10-CM

## 2024-04-10 DIAGNOSIS — I87.393 CHRONIC VENOUS HYPERTENSION WITH COMPLICATION INVOLVING BOTH SIDES: Primary | ICD-10-CM

## 2024-04-10 DIAGNOSIS — I48.19 PERSISTENT ATRIAL FIBRILLATION (MULTI): ICD-10-CM

## 2024-04-10 PROCEDURE — 1126F AMNT PAIN NOTED NONE PRSNT: CPT | Performed by: INTERNAL MEDICINE

## 2024-04-10 PROCEDURE — 3078F DIAST BP <80 MM HG: CPT | Performed by: INTERNAL MEDICINE

## 2024-04-10 PROCEDURE — 99213 OFFICE O/P EST LOW 20 MIN: CPT | Performed by: INTERNAL MEDICINE

## 2024-04-10 PROCEDURE — 1160F RVW MEDS BY RX/DR IN RCRD: CPT | Performed by: INTERNAL MEDICINE

## 2024-04-10 PROCEDURE — 93005 ELECTROCARDIOGRAM TRACING: CPT | Performed by: INTERNAL MEDICINE

## 2024-04-10 PROCEDURE — 3079F DIAST BP 80-89 MM HG: CPT | Performed by: INTERNAL MEDICINE

## 2024-04-10 PROCEDURE — 99214 OFFICE O/P EST MOD 30 MIN: CPT | Performed by: INTERNAL MEDICINE

## 2024-04-10 PROCEDURE — 1036F TOBACCO NON-USER: CPT | Performed by: INTERNAL MEDICINE

## 2024-04-10 PROCEDURE — 1159F MED LIST DOCD IN RCRD: CPT | Performed by: INTERNAL MEDICINE

## 2024-04-10 PROCEDURE — 3075F SYST BP GE 130 - 139MM HG: CPT | Performed by: INTERNAL MEDICINE

## 2024-04-10 PROCEDURE — 3074F SYST BP LT 130 MM HG: CPT | Performed by: INTERNAL MEDICINE

## 2024-04-10 PROCEDURE — 1157F ADVNC CARE PLAN IN RCRD: CPT | Performed by: INTERNAL MEDICINE

## 2024-04-10 PROCEDURE — 93010 ELECTROCARDIOGRAM REPORT: CPT | Performed by: INTERNAL MEDICINE

## 2024-04-10 RX ORDER — FEXOFENADINE HYDROCHLORIDE 180 MG/1
180 TABLET, FILM COATED ORAL DAILY
COMMUNITY
Start: 2024-02-16

## 2024-04-10 RX ORDER — FLUTICASONE PROPIONATE AND SALMETEROL 250; 50 UG/1; UG/1
POWDER RESPIRATORY (INHALATION) EVERY 12 HOURS
COMMUNITY
Start: 2018-05-07

## 2024-04-10 ASSESSMENT — ENCOUNTER SYMPTOMS: DEPRESSION: 0

## 2024-04-10 ASSESSMENT — CHA2DS2 SCORE
CHF OR LEFT VENTRICULAR DYSFUNCTION: YES
VASCULAR DISEASE: NO
CHA2D2S VASC SCORE: 6
AGE IN YEARS: 75+
PRIOR STROKE OR TIA OR THROMBOEMBOLISM: NO
SEX: FEMALE
HYPERTENSION: YES
DIABETES: YES

## 2024-04-10 ASSESSMENT — PATIENT HEALTH QUESTIONNAIRE - PHQ9
2. FEELING DOWN, DEPRESSED OR HOPELESS: NOT AT ALL
SUM OF ALL RESPONSES TO PHQ9 QUESTIONS 1 AND 2: 0
1. LITTLE INTEREST OR PLEASURE IN DOING THINGS: NOT AT ALL

## 2024-04-10 ASSESSMENT — COLUMBIA-SUICIDE SEVERITY RATING SCALE - C-SSRS
6. HAVE YOU EVER DONE ANYTHING, STARTED TO DO ANYTHING, OR PREPARED TO DO ANYTHING TO END YOUR LIFE?: NO
2. HAVE YOU ACTUALLY HAD ANY THOUGHTS OF KILLING YOURSELF?: NO
1. IN THE PAST MONTH, HAVE YOU WISHED YOU WERE DEAD OR WISHED YOU COULD GO TO SLEEP AND NOT WAKE UP?: NO

## 2024-04-10 ASSESSMENT — PAIN SCALES - GENERAL
PAINLEVEL: 0-NO PAIN
PAINLEVEL: 0-NO PAIN

## 2024-04-10 NOTE — PATIENT INSTRUCTIONS
It was nice to see you today!  We discussed that you would have an echo and then a heart monitor placed.  After we review the results we can decide about next steps.  It may be time to consider a more definitive procedure to control the heart rate - AVN ablation and pacemaker.      We discussed that you would wear the heart monitor for 14 days.  After 24 hours it can go in the shower. Please push the button when  you have your typical symptoms. In 14 days remove the monitor and return to a [employer] drop off.  We will then call you with results and next steps in about 7-10 days after return.  You should do all of your normal activities while you are wearing the monitor.   Please call if any questions [phone].

## 2024-04-10 NOTE — PROGRESS NOTES
OUTPATIENT FOLLOW-UP -  VASCULAR MEDICINE    DOS:  4/10/24  Last seen:    10/11/23    REQUESTING PHYSICIAN:  Dr. Thomas Patino    REASON FOR FOLLOW-UP:  here for follow up CVHTN    HISTORY OF PRESENT ILLNESS:     84 yo lady here for follow up CVHTN. Legs are doing well. Has a new dog. Retired this year, too. 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 Exercising at the [employer] center. Has not given up the scooter. Using the compression.     REVIEW OF SYSTEMS:     weight is decreased 20# - reports not hungry  No sores, ulcers, rashes, +skin lesions  No CP, + chest pressure  No cough, +SOB  + edema, no calf pain  Having some dizzyness - mostly in the morning    PHYSICAL EXAMINATION:   Gen: Appears well, NAD  Chest: CTA  CVS: irregular without murmur or gallop  Ext: 1+ edema, nontender  Skin: mild LDS and hemosiderin staining gerard  Mood and affect appropriate    ADDITIONAL DATA:   15-20mmHg compression worn  right calf:  46.5cm left calf:   46.0cm     ASSESSMENT/PLAN:    here for follow up CVHTN - legs are doing OK. Continue the skin care. Continue the compression. Elevate at night/. Walk 3 minutes every hour. Follow up 6 months - sooner for new problems.

## 2024-04-10 NOTE — PATIENT INSTRUCTIONS
ASSESSMENT/PLAN:    here for follow up CVHTN - legs are doing OK. Continue the skin care. Continue the compression. Elevate at night/. Walk 3 minutes every hour. Follow up 6 months - sooner for new problems.

## 2024-04-10 NOTE — PROGRESS NOTES
Returns for follow up visit for  persistent atrial fibrillation        History Of Present Illness:    Promise Perry is a 83 y.o. year old female patient  today reports some chest heaviness. She notices this during the day more than at night. It does not awaken her. It is unrelated to activity.  Activity brings on shortness of breath but the chest heaviness is unrelated.  Pulse rates have been running high.  Over the last month she has been feeling more chest heaviness. Feels more limited with exertion.       82 yo who developed atrial fibrillation in the mid 1990's. She underwent attempt at external and then internal cardioversion. Sinus rhythm was maintained for only 1 day. So has been in atrial fibrillation for the last 20 years at least.      Her main complaint is of lack of energy. She saw Dr Michael Yun in February and had a resting heart rate of 108 so the metoprolol was increased.      She did have a CT angio in August 2017 for chest pressure but although heavy calcification no significant stenosis.   She is on a rate control strategy.     She recently retired. She volunteers at the [facility]. She got a new dog.        Sleep apnea - CPAP regular user  HFpEF; HTN; chronic venous hypertension; COPD;DMII; hypothyroidism    Past Medical History:  Past Medical History:   Diagnosis Date    Arthritis     Asthma     CHF (congestive heart failure) (CMS/HCC)     Chronic kidney disease, unspecified 07/21/2013    Chronic kidney disease    Chronic obstructive pulmonary disease, unspecified (CMS/HCC) 04/16/2018    Chronic obstructive pulmonary disease    Chronic obstructive pulmonary disease, unspecified (CMS/HCC) 06/18/2018    Chronic obstructive pulmonary disease    Chronic venous hypertension (idiopathic) with other complications of unspecified lower extremity 04/16/2018    Chronic venous hypertension with complication    Chronic venous hypertension (idiopathic) with other complications of unspecified lower extremity 06/18/2018     Chronic venous hypertension with complication    Diabetes mellitus (CMS/[facility])     Diverticulosis of intestine, part unspecified, without perforation or abscess without bleeding 07/21/2013    Diverticulosis    Edema, unspecified 04/16/2018    Edema    Edema, unspecified 06/18/2018    Edema    Essential (primary) hypertension 12/17/2022    Hypertension    Essential (primary) hypertension 07/21/2013    Hypertension    Gastro-esophageal reflux disease without esophagitis 07/21/2013    Esophageal reflux    Gastrointestinal hemorrhage, unspecified 07/21/2013    Gastrointestinal bleeding    Hyperlipidemia, unspecified 07/21/2013    Hyperlipidemia    Hypothyroidism, unspecified 04/16/2018    Hypothyroidism    Hypothyroidism, unspecified 06/18/2018    Hypothyroidism    Malignant neoplasm of thyroid gland (CMS/[facility]) 12/16/2022    Thyroid cancer    Obesity     Obstructive sleep apnea (adult) (pediatric) 04/16/2018    Sleep apnea, obstructive    Obstructive sleep apnea (adult) (pediatric) 06/18/2018    Sleep apnea, obstructive    Other disorders of electrolyte and fluid balance, not elsewhere classified 04/16/2018    Electrolyte and fluid disorder    Other disorders of electrolyte and fluid balance, not elsewhere classified 06/18/2018    Electrolyte and fluid disorder    Other fatigue 04/16/2018    Fatigue    Other fatigue 06/18/2018    Fatigue    Other forms of dyspnea 04/16/2018    Dyspnea on effort    Other forms of dyspnea 06/18/2018    Dyspnea on effort    Pain in unspecified knee 06/18/2018    Joint pain, knee    Personal history of other specified conditions 04/10/2017    History of fatigue    Pneumonia     Unspecified abdominal hernia without obstruction or gangrene     Hernia    Unspecified atrial fibrillation (CMS/[facility]) 12/16/2022    Atrial fibrillation    Unspecified diastolic (congestive) heart failure (CMS/[facility]) 10/18/2022    (HFpEF) heart failure with preserved ejection fraction    Unspecified osteoarthritis,  unspecified site 04/16/2018    Arthritis    Unspecified osteoarthritis, unspecified site 06/18/2018    Arthritis   PFTS- restrictive lung disease    Past Surgical History:  Past Surgical History:   Procedure Laterality Date    CATARACT EXTRACTION  03/12/2018    Cataract Extraction    COLONOSCOPY  08/28/2014    Complete Colonoscopy    CT ANGIO CORONARY ART WITH HEARTFLOW IF SCORE >30%  08/15/2017    [place] HEART [place]ONARY [place] [facility] [place]    ESOPHAGOGASTRODUODENOSCOPY  08/28/2014    Diagnostic Esophagogastroduodenoscopy    INCISIONAL BREAST BIOPSY  09/25/2013    Incisional Breast Biopsy    JOINT REPLACEMENT      TOTAL THYROIDECTOMY  09/25/2013    Thyroid Surgery Total Thyroidectomy        Family History:  Family History   Problem Relation Name Age of Onset    Hypertension Mother Esther Perry     Heart disease Mother Esther Perry     Cancer Father Pankaj Perry          Allergies:  Allergies   Allergen Reactions    Ceclor [Cefaclor] Unknown    Codeine Nausea Only    Lisinopril Other     severve hypotension    Losartan Unknown    Quinidine Unknown    Allopurinol Rash    Colchicine Rash    Erythromycin Rash        Outpatient Medications:  Current Outpatient Medications   Medication Instructions    acetaminophen (Tylenol) 325 mg tablet oral, Every 6 hours PRN    allopurinol (ZYLOPRIM) 200 mg, oral, Daily    AmaryL 1 mg, oral, Daily    blood sugar diagnostic (OneTouch Verio test strips) strip Daily RT    Bydureon BCise 2 mg/0.85 mL auto-injector subcutaneous, Once Weekly, Inject contents of (1) pen subcutaneously once a week as directed.    celecoxib (CELEBREX) 200 mg, oral, Daily    citalopram (CELEXA) 20 mg, oral, Daily    colchicine 0.6 mg, oral, Daily    docusate sodium (Colace) 100 mg capsule oral, 2 times daily    fluticasone (Flonase) 50 mcg/actuation nasal spray 2 sprays, Each Nostril, Daily, Shake gently. Before first use, prime pump. After use, clean tip and replace cap.    guaiFENesin  "(MUCINEX) 1,200 mg, oral, 2 times daily, Do not crush, chew, or split.    hydrALAZINE (APRESOLINE) 50 mg, oral, 2 times daily    levothyroxine (SYNTHROID, LEVOXYL) 150 mcg, oral, Daily, Take 1 tablet daily for 6 days then 1/2 tab on 7th day    lisinopril 20 mg tablet 1 tablet, oral, Daily    metFORMIN XR (Glucophage-XR) 500 mg 24 hr tablet 3 tablets, oral, Daily    metoprolol succinate XL (Toprol-XL) 50 mg 24 hr tablet oral    polyethylene glycol (GLYCOLAX, MIRALAX) 17 g, oral, 2 times daily    psyllium (Metamucil, sugar,) powder oral    simvastatin (ZOCOR) 40 mg, oral, Daily    torsemide (DEMADEX) 10 mg, oral, Daily    triamcinolone (Kenalog) 0.1 % cream Topical, 2 times daily, Apply to affected area 1-2 times daily as needed. Avoid face and groin.    verapamil SR (Calan-SR) 240 mg ER tablet 1 tablet, oral, Daily    warfarin (Coumadin) 5 mg tablet oral, 1 tab daily 1/2 tab sat none on sun          Last Recorded Vitals:      9/19/2023     2:21 PM 10/11/2023    10:39 AM 11/7/2023     2:31 PM 1/4/2024     9:02 AM 1/24/2024     2:33 PM 2/13/2024     1:04 PM 3/18/2024     8:27 AM   Vitals   Systolic 128 143 130 133 138 134 123   Diastolic 74 82 82 74 62 72 73   Heart Rate 86 83 83 102 82 99 99   Temp    36.3 °C (97.3 °F) 36.3 °C (97.3 °F)  36.4 °C (97.5 °F)   Resp 16 20 14 16 16 14 16   Height (in)  1.727 m (5' 8\") 1.727 m (5' 8\") 1.727 m (5' 8\") 1.727 m (5' 8\") 1.727 m (5' 8\") 1.727 m (5' 8\")   Weight (lb) 273.8 275 275 260.8 255.5 263 252   BMI 41.63 kg/m2 41.81 kg/m2 41.81 kg/m2 39.65 kg/m2 38.85 kg/m2 39.99 kg/m2 38.32 kg/m2   BSA (m2) 2.44 m2 2.45 m2 2.45 m2 2.38 m2 2.36 m2 2.39 m2 2.34 m2   Visit Report Report Report Report Report Report Report Report        Physical Exam:  Physical Exam  Constitutional:       Appearance: Normal appearance. She is obese.   HENT:      Head: Normocephalic.      Nose: Nose normal.   Neck:      Vascular: No carotid bruit.   Cardiovascular:      Rate and Rhythm: Normal rate. Rhythm " irregular.      Heart sounds: Murmur heard.      No friction rub. No gallop.   Pulmonary:      Breath sounds: Normal breath sounds. No wheezing, rhonchi or rales.   Musculoskeletal:         General: Normal range of motion.      Right lower leg: Edema present.      Left lower leg: Edema present.   Skin:     General: Skin is warm and dry.   Neurological:      General: No focal deficit present.      Mental Status: She is alert and oriented to person, place, and time.   Psychiatric:         Mood and Affect: Mood normal.         Behavior: Behavior normal.        Systolic murmur soft LUSB       Last Cardiology Tests:  ECG:    Today: Atrial fibrillation VR 80 bpm anteroseptal infarct age undetermined inferolaeral TW abnormlaity     Cardiac Monitor:  4/2023    Atrial fibrillation VR  bpm with average 89 bpm       Cardiac Imaging:  Narrative & Impression   Interpreted By:  Néstor Gonzales and Marshall Colin   STUDY:  CT CHEST WO IV CONTRAST;  4/4/2024 2:36 pm      INDICATION:  Signs/Symptoms:f/u lung nodules.      COMPARISON:  CT chest dated 01/15/2024 and 01/08/2013.      ACCESSION NUMBER(S):  [id number]      ORDERING CLINICIAN:  MICHELLE SUAREZ      TECHNIQUE:  Helical data acquisition of the chest was obtained  without IV  contrast material.  Images were reformatted in axial, coronal, and  sagittal planes.      FINDINGS:  LUNGS AND AIRWAYS:  The trachea and central airways are patent. No endobronchial lesion.      Stable size of an irregular solid nodule within the right upper lobe  measuring 1 cm (series 3, image 92). This nodule was new on most  recent prior exam dated 01/15/2024. Interval decreased thickness of a  linear branching opacity within the superior aspect of the right  lower lobe which measures up to 0.5 cm in thickness, previously  measuring up to 0.9 cm and likely reflecting chronic distal bronchial  mucous plugging (image 106). New adjacent 0.3 cm pulmonary nodules  within the right lower  lobe (image 162). Stable size of a 0.5 cm  pleural-based nodule within the right upper lobe (image 92). Stable  size of a 0.3 cm right middle lobe pulmonary nodule (image 141).      There are new ground-glass airspace opacities within the bilateral  upper lobes with subtle tree-in-bud nodularity, better seen on the  right side for instance as seen on series 3, image 84. No new focal  consolidation, pleural effusion, or pneumothorax.      MEDIASTINUM AND DARRELL, LOWER NECK AND AXILLA:  The visualized thyroid gland is within normal limits.      No evidence of thoracic lymphadenopathy by CT criteria.      Esophagus appears within normal limits as seen.      HEART AND VESSELS:  The thoracic aorta is of normal course and caliber with mild vascular  calcifications. 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      There is again dilatation of the main pulmonary artery measuring up  to 4.1 cm in diameter.      Severe coronary artery calcifications are seen. The study is not  optimized for evaluation of coronary arteries.      There is again biatrial cardiac enlargement.      No evidence of pericardial effusion.      UPPER ABDOMEN:  The visualized subdiaphragmatic structures demonstrate no remarkable  findings.      CHEST WALL AND OSSEOUS STRUCTURES:  The chest wall soft tissues are unremarkable. No acute osseous  abnormalities or suspicious osseous lesions mild discogenic  degenerative changes throughout the thoracic spine are similar when  compared to prior exams.      IMPRESSION:  1.  Stable size of an irregular solid nodule within the right upper  lobe. Interval decreased thickness of a linear branching opacity  within the superior aspect of the right lower lobe likely reflecting  resolving chronic distal bronchial mucous plugging. New ground-glass  airspace opacities of the bilateral upper lobes with tree-in-bud  nodularity as well as new 0.3 cm nodular opacities within the right  lower lobe.  These constellation of findings are most compatible with  a waxing and waning infectious/inflammatory bronchiolitis. Recommend  additional short-term CT chest follow-up in 3 months for  re-evaluation.  2. Mild dilatation of the main pulmonary artery again visualized.  Correlate with history of pulmonary hypertension.  3. Biatrial cardiac enlargement.  4. Severe coronary artery calcifications.      I personally reviewed the images/study and I agree with the resident  findings as stated. This study was interpreted at [facility], [place], [place].      MACRO:  None      Signed by: Néstor Gonzales 4/6/2024 2:10 PM  Dictation workstation:   [id number]       Lab review: I have Chemistry CMP:   Lab Results   Component Value Date    ALBUMIN 4.1 01/24/2024    CALCIUM 9.3 01/24/2024    CO2 27 01/24/2024    CREATININE 1.41 (H) 01/24/2024    GLUCOSE 200 (H) 01/24/2024    BILITOT 0.7 01/24/2024    PROT 6.7 01/24/2024    ALT 13 01/24/2024    AST 16 01/24/2024    ALKPHOS 76 01/24/2024   , Chemistry BMP   Lab Results   Component Value Date    GLUCOSE 200 (H) 01/24/2024    CALCIUM 9.3 01/24/2024    CO2 27 01/24/2024    CREATININE 1.41 (H) 01/24/2024   , and CBC:  Lab Results   Component Value Date    WBC 6.4 01/24/2024    RBC 4.29 01/24/2024    HGB 11.8 (L) 01/24/2024    HCT 38.8 01/24/2024    MCV 90 01/24/2024    MCH 27.5 01/24/2024    MCHC 30.4 (L) 01/24/2024    RDW 17.0 (H) 01/24/2024    NRBC 0.0 01/24/2024       Assessment/Plan   Problem List Items Addressed This Visit             ICD-10-CM    Atrial fibrillation (CMS/HCC) I48.91    Relevant Orders    ECG 12 lead (Clinic Performed)    Transthoracic Echo (TTE) Complete    Holter Or Event Cardiac Monitor    B-Type Natriuretic Peptide     Other Visit Diagnoses         Codes    Dyspnea on exertion     R06.09    Relevant Orders    B-Type Natriuretic Peptide          She has significant limitation due to exertional shortness of breath and now chest  heaviness. She will repeat echo and wear a monitor. She has extensive CA calcification on CT of chest and will see Dr Estrada in about a month.    We will consider if she should have AVN and pacemaker for heart rate control and withdrawal of verapamil/metoprolol.    Mariana De La Vega MD

## 2024-04-19 LAB
ATRIAL RATE: 61 BPM
Q ONSET: 225 MS
QRS COUNT: 13 BEATS
QRS DURATION: 88 MS
QT INTERVAL: 384 MS
QTC CALCULATION(BAZETT): 442 MS
QTC FREDERICIA: 423 MS
R AXIS: 101 DEGREES
T AXIS: 234 DEGREES
T OFFSET: 417 MS
VENTRICULAR RATE: 80 BPM

## 2024-04-24 ENCOUNTER — HOSPITAL ENCOUNTER (OUTPATIENT)
Dept: CARDIOLOGY | Facility: CLINIC | Age: 84
Discharge: HOME | End: 2024-04-24
Payer: MEDICARE

## 2024-04-24 DIAGNOSIS — I48.91 UNSPECIFIED ATRIAL FIBRILLATION (MULTI): ICD-10-CM

## 2024-04-24 DIAGNOSIS — I48.19 PERSISTENT ATRIAL FIBRILLATION (MULTI): ICD-10-CM

## 2024-04-24 PROCEDURE — 93306 TTE W/DOPPLER COMPLETE: CPT | Performed by: STUDENT IN AN ORGANIZED HEALTH CARE EDUCATION/TRAINING PROGRAM

## 2024-04-24 PROCEDURE — 93306 TTE W/DOPPLER COMPLETE: CPT

## 2024-04-24 PROCEDURE — 93246 EXT ECG>7D<15D RECORDING: CPT

## 2024-04-25 LAB
AORTIC VALVE MEAN GRADIENT: 3 MMHG
AORTIC VALVE PEAK VELOCITY: 1.4 M/S
AV PEAK GRADIENT: 7.8 MMHG
AVA (PEAK VEL): 2.03 CM2
AVA (VTI): 2.62 CM2
EJECTION FRACTION APICAL 4 CHAMBER: 58.1
LEFT ATRIUM VOLUME AREA LENGTH INDEX BSA: 49.3 ML/M2
LEFT VENTRICLE INTERNAL DIMENSION DIASTOLE: 5.06 CM (ref 3.5–6)
LEFT VENTRICULAR OUTFLOW TRACT DIAMETER: 2.16 CM
LV EJECTION FRACTION BIPLANE: 65 %
MITRAL VALVE E/E' RATIO: 12.61
RIGHT VENTRICLE FREE WALL PEAK S': 10 CM/S
RIGHT VENTRICLE PEAK SYSTOLIC PRESSURE: 33.1 MMHG
TRICUSPID ANNULAR PLANE SYSTOLIC EXCURSION: 1.4 CM

## 2024-04-30 DIAGNOSIS — R06.09 DYSPNEA ON EXERTION: Primary | ICD-10-CM

## 2024-04-30 RX ORDER — TORSEMIDE 20 MG/1
10 TABLET ORAL DAILY
Qty: 45 TABLET | Refills: 11 | Status: SHIPPED | OUTPATIENT
Start: 2024-04-30

## 2024-05-23 ENCOUNTER — LAB (OUTPATIENT)
Dept: LAB | Facility: LAB | Age: 84
End: 2024-05-23
Payer: MEDICARE

## 2024-05-23 DIAGNOSIS — I48.19 PERSISTENT ATRIAL FIBRILLATION (MULTI): ICD-10-CM

## 2024-05-23 DIAGNOSIS — I48.91 ATRIAL FIBRILLATION, UNSPECIFIED TYPE (MULTI): ICD-10-CM

## 2024-05-23 DIAGNOSIS — D64.9 ANEMIA, UNSPECIFIED TYPE: ICD-10-CM

## 2024-05-23 DIAGNOSIS — N18.32 STAGE 3B CHRONIC KIDNEY DISEASE (MULTI): ICD-10-CM

## 2024-05-23 DIAGNOSIS — R06.09 DYSPNEA ON EXERTION: ICD-10-CM

## 2024-05-23 LAB
ANION GAP SERPL CALC-SCNC: 13 MMOL/L (ref 10–20)
BNP SERPL-MCNC: 463 PG/ML (ref 0–99)
BUN SERPL-MCNC: 32 MG/DL (ref 6–23)
CALCIUM SERPL-MCNC: 9.5 MG/DL (ref 8.6–10.3)
CHLORIDE SERPL-SCNC: 101 MMOL/L (ref 98–107)
CO2 SERPL-SCNC: 29 MMOL/L (ref 21–32)
CREAT SERPL-MCNC: 1.47 MG/DL (ref 0.5–1.05)
EGFRCR SERPLBLD CKD-EPI 2021: 35 ML/MIN/1.73M*2
ERYTHROCYTE [DISTWIDTH] IN BLOOD BY AUTOMATED COUNT: 14.6 % (ref 11.5–14.5)
GLUCOSE SERPL-MCNC: 240 MG/DL (ref 74–99)
HCT VFR BLD AUTO: 38.4 % (ref 36–46)
HGB BLD-MCNC: 12.1 G/DL (ref 12–16)
INR PPP: 2.4 (ref 0.9–1.1)
MCH RBC QN AUTO: 28.8 PG (ref 26–34)
MCHC RBC AUTO-ENTMCNC: 31.5 G/DL (ref 32–36)
MCV RBC AUTO: 91 FL (ref 80–100)
NRBC BLD-RTO: 0 /100 WBCS (ref 0–0)
PLATELET # BLD AUTO: 220 X10*3/UL (ref 150–450)
POTASSIUM SERPL-SCNC: 4.7 MMOL/L (ref 3.5–5.3)
PROTHROMBIN TIME: 27.1 SECONDS (ref 9.8–12.8)
RBC # BLD AUTO: 4.2 X10*6/UL (ref 4–5.2)
SODIUM SERPL-SCNC: 138 MMOL/L (ref 136–145)
WBC # BLD AUTO: 7 X10*3/UL (ref 4.4–11.3)

## 2024-05-28 ENCOUNTER — OFFICE VISIT (OUTPATIENT)
Dept: PRIMARY CARE | Facility: CLINIC | Age: 84
End: 2024-05-28
Payer: MEDICARE

## 2024-05-28 VITALS
WEIGHT: 247 LBS | HEIGHT: 68 IN | SYSTOLIC BLOOD PRESSURE: 104 MMHG | HEART RATE: 94 BPM | RESPIRATION RATE: 16 BRPM | BODY MASS INDEX: 37.44 KG/M2 | OXYGEN SATURATION: 96 % | DIASTOLIC BLOOD PRESSURE: 64 MMHG

## 2024-05-28 DIAGNOSIS — E11.22 TYPE 2 DIABETES MELLITUS WITH CHRONIC KIDNEY DISEASE, WITHOUT LONG-TERM CURRENT USE OF INSULIN, UNSPECIFIED CKD STAGE (MULTI): Primary | ICD-10-CM

## 2024-05-28 DIAGNOSIS — E55.9 VITAMIN D DEFICIENCY: ICD-10-CM

## 2024-05-28 DIAGNOSIS — I50.30 HEART FAILURE WITH PRESERVED EJECTION FRACTION, UNSPECIFIED HF CHRONICITY (MULTI): ICD-10-CM

## 2024-05-28 DIAGNOSIS — I48.91 ATRIAL FIBRILLATION, UNSPECIFIED TYPE (MULTI): ICD-10-CM

## 2024-05-28 DIAGNOSIS — J44.9 CHRONIC OBSTRUCTIVE PULMONARY DISEASE, UNSPECIFIED COPD TYPE (MULTI): ICD-10-CM

## 2024-05-28 LAB — POC HEMOGLOBIN A1C: 6.3 % (ref 4.2–6.5)

## 2024-05-28 RX ORDER — BLOOD-GLUCOSE METER
1 EACH MISCELLANEOUS
Qty: 100 STRIP | Refills: 3 | Status: SHIPPED | OUTPATIENT
Start: 2024-05-28

## 2024-05-28 RX ORDER — METFORMIN HYDROCHLORIDE 500 MG/1
1500 TABLET, EXTENDED RELEASE ORAL DAILY
Qty: 270 TABLET | Refills: 3 | Status: SHIPPED | OUTPATIENT
Start: 2024-05-28

## 2024-05-28 ASSESSMENT — PATIENT HEALTH QUESTIONNAIRE - PHQ9
SUM OF ALL RESPONSES TO PHQ9 QUESTIONS 1 AND 2: 0
1. LITTLE INTEREST OR PLEASURE IN DOING THINGS: NOT AT ALL
2. FEELING DOWN, DEPRESSED OR HOPELESS: NOT AT ALL

## 2024-05-28 NOTE — PATIENT INSTRUCTIONS
A1C TODAY IS 6.3% = EXCELLENT DIABETIC CONTROL.  GOAL IS < 7.0%    2.  ONGOING DIET/EXERCISE/WEIGHT MANAGEMENT FOR ONGOING DIABETES CONTROL    3.  ECHOCARDIOGRAM RESULTS APPEAR UNCHANGED FROM BEFORE AND DENOTE GOOD LEFT VENTRICLE PUMP FUNCTION.  THE HOLTER MONITOR RESULT WILL THEREFORE LET DOCTOR KNOW WHETHER AV BENITEZ ABLATION AND PACEMAKER PLACEMENT IS INDICATED TO KEEP THE HEART SLOWER, AND POTENTIALLY OBVIATE THE NEED FOR RATE-SLOWING MEDICATIONS SOME OF WHICH ARE ASSOCIATED  WITH FATIGUE    4.  IT IS MOST LIKELY ALLOPURINOL THAT CAUSED YOUR RASH, SO I AGREE WITH STOPPING IT AND COLCHICINE FOR NOW.  IF IN THE FUTURE, YOUR BONE SPURS ARE CORRECTED, BUT YET YOU STILL HAVE WHAT MAY BE CONSIDERED GOUT PAINS, THEN WE MIGHT ENTERTAIN TAKING A REPLACEMENT FOR ALLOPURINOL = FEBUXOSTAT/ULORIC IN AN EFFORT TO LOWER URIC ACID AGAIN    5.  LABS LOOK GOOD ACTUALLY    6.  FOLLOW UP 3-4 MONTHS OR AS NEEDED    7.  LABS ARE ORDERED FOR PRIOR TO NEXT VISIT

## 2024-05-28 NOTE — PROGRESS NOTES
"Subjective   Promise Perry is a 84 y.o. female who presents for FOLLOW UP     HPI   LAST TIME HERE HAD A RASH ON THE ARMS.  GAVE CREAM BUT KEPT GETTING WORSE AND SPREADING.    WENT TO [facility], DID A PUNCH BIOPSY, CAME BACK REACTION TO MEDICATIONS.  NEW MEDS WERE THE ONES FOR GOUT.  STOPPED TAKING THE MEDS AND THE RASH WENT AWAY WITH TIME    THEN HAD APPT WITH CARDIOLOGIST, EP, DR. HARRIS, FOR AFIB.  ALWAYS EXHAUSTED AND HARD TO BREATH, WAS THEN PUT ON A MONTH HEART MONITOR AND AN ECHO DONE.  WAITING FOR RESULTS AT THIS TIME.  PENDING THOSE RESULTS, THEN COULD BE EITHER HEART OR SOME LACK OF OXYGEN WHEN EXERCISING.  WAS TO TALK WITH [place] DOWNTOWN REGARDING WHETHER EXERCISE OXYGEN TESTING INDICATED    NOW THINKING THAT MAY NEED ABLATION WITH PACEMAKER.  IN HOPES THAT CAN COME OFF OF SOME MEDICATIONS    OPERATED ON RIGHT GREAT TOE BONE SPUR, SHAVED IT DOWN AND COMING ALONG OK      Review of Systems   Constitutional:  Negative for chills, diaphoresis and fever.   Respiratory:  Negative for cough and shortness of breath.    Cardiovascular:  Negative for chest pain and leg swelling.   Gastrointestinal:  Negative for constipation, diarrhea, nausea and vomiting.   Musculoskeletal:  Negative for joint swelling and myalgias.       Objective   /64   Pulse 94   Resp 16   Ht 1.727 m (5' 8\")   Wt 112 kg (247 lb)   SpO2 96%   BMI 37.56 kg/m²     Physical Exam  Vitals reviewed.   Constitutional:       General: She is not in acute distress.     Appearance: She is obese. She is not ill-appearing.   Cardiovascular:      Rate and Rhythm: Normal rate and regular rhythm.      Pulses: Normal pulses.      Heart sounds:      No gallop.   Pulmonary:      Breath sounds: Normal breath sounds. No wheezing, rhonchi or rales.   Abdominal:      General: Abdomen is flat. Bowel sounds are normal.      Palpations: Abdomen is soft.      Tenderness: There is no guarding or rebound.   Musculoskeletal:      Right lower leg: " No edema.      Left lower leg: No edema.   Skin:     Findings: Rash present.         Assessment/Plan   Problem List Items Addressed This Visit       (HFpEF) heart failure with preserved ejection fraction (Multi)    Atrial fibrillation (Multi)    Relevant Orders    Protime-INR    Chronic obstructive pulmonary disease (Multi)    Type 2 diabetes mellitus with chronic kidney disease, without long-term current use of insulin, unspecified CKD stage (Multi) - Primary    Relevant Medications    metFORMIN  mg 24 hr tablet    blood sugar diagnostic (OneTouch Verio test strips) strip    Other Relevant Orders    POCT glycosylated hemoglobin (Hb A1C) manually resulted    Hemoglobin A1C    Comprehensive Metabolic Panel    CBC    TSH with reflex to Free T4 if abnormal     Other Visit Diagnoses       Vitamin D deficiency        Relevant Orders    Vitamin D 25-Hydroxy,Total (for eval of Vitamin D levels)          Patient Instructions    A1C TODAY IS 6.3% = EXCELLENT DIABETIC CONTROL.  GOAL IS < 7.0%    2.  ONGOING DIET/EXERCISE/WEIGHT MANAGEMENT FOR ONGOING DIABETES CONTROL    3.  ECHOCARDIOGRAM RESULTS APPEAR UNCHANGED FROM BEFORE AND DENOTE GOOD LEFT VENTRICLE PUMP FUNCTION.  THE HOLTER MONITOR RESULT WILL THEREFORE LET DOCTOR KNOW WHETHER AV BENITEZ ABLATION AND PACEMAKER PLACEMENT IS INDICATED TO KEEP THE HEART SLOWER, AND POTENTIALLY OBVIATE THE NEED FOR RATE-SLOWING MEDICATIONS SOME OF WHICH ARE ASSOCIATED  WITH FATIGUE    4.  IT IS MOST LIKELY ALLOPURINOL THAT CAUSED YOUR RASH, SO I AGREE WITH STOPPING IT AND COLCHICINE FOR NOW.  IF IN THE FUTURE, YOUR BONE SPURS ARE CORRECTED, BUT YET YOU STILL HAVE WHAT MAY BE CONSIDERED GOUT PAINS, THEN WE MIGHT ENTERTAIN TAKING A REPLACEMENT FOR ALLOPURINOL = FEBUXOSTAT/ULORIC IN AN EFFORT TO LOWER URIC ACID AGAIN    5.  LABS LOOK GOOD ACTUALLY    6.  FOLLOW UP 3-4 MONTHS OR AS NEEDED    7.  LABS ARE ORDERED FOR PRIOR TO NEXT VISIT

## 2024-05-30 ASSESSMENT — ENCOUNTER SYMPTOMS
MYALGIAS: 0
SHORTNESS OF BREATH: 0
CHILLS: 0
VOMITING: 0
JOINT SWELLING: 0
CONSTIPATION: 0
NAUSEA: 0
COUGH: 0
FEVER: 0
DIARRHEA: 0
DIAPHORESIS: 0

## 2024-06-03 ENCOUNTER — TELEPHONE (OUTPATIENT)
Dept: SCHEDULING | Age: 84
End: 2024-06-03
Payer: MEDICARE

## 2024-06-05 ENCOUNTER — APPOINTMENT (OUTPATIENT)
Dept: DERMATOLOGY | Facility: CLINIC | Age: 84
End: 2024-06-05
Payer: MEDICARE

## 2024-06-14 ENCOUNTER — TELEPHONE (OUTPATIENT)
Dept: CARDIOLOGY | Facility: CLINIC | Age: 84
End: 2024-06-14
Payer: MEDICARE

## 2024-06-14 NOTE — TELEPHONE ENCOUNTER
Spoke with patient and shared monitor results as reviewed by Dr. De La Vega. Informed patient that she remains in atrial fibrillation with an average heart rate of 84 beats per minute. However, heart rates are rapid (150s-160s) with activities. Therefore, Dr. De La Vega would like her to take an additional 25 mg of Metoprolol for better heart rate control. Patient provided update that she currently takes (per Dr. Nielsen) 75 mg of Metoprolol daily: 50 mg in the morning & 25 mg in the evening. Instructed patient to add the additional 25 mg to her evening dose bringing her daily dose to 100 mg (50 mg in the morning & 50 mg in the evening).   Patient verbalized understanding, stating she would update Dr. Nielsen as to how she is tolerating med increase at next follow up appointment already scheduled for 6/18/2024.

## 2024-06-18 ENCOUNTER — OFFICE VISIT (OUTPATIENT)
Dept: CARDIOLOGY | Facility: HOSPITAL | Age: 84
End: 2024-06-18
Payer: MEDICARE

## 2024-06-18 ENCOUNTER — LAB (OUTPATIENT)
Dept: LAB | Facility: LAB | Age: 84
End: 2024-06-18
Payer: MEDICARE

## 2024-06-18 VITALS
HEART RATE: 85 BPM | BODY MASS INDEX: 37.74 KG/M2 | OXYGEN SATURATION: 94 % | WEIGHT: 249 LBS | DIASTOLIC BLOOD PRESSURE: 65 MMHG | SYSTOLIC BLOOD PRESSURE: 111 MMHG | HEIGHT: 68 IN

## 2024-06-18 DIAGNOSIS — I48.91 ATRIAL FIBRILLATION, UNSPECIFIED TYPE (MULTI): ICD-10-CM

## 2024-06-18 DIAGNOSIS — R06.09 DYSPNEA ON EXERTION: ICD-10-CM

## 2024-06-18 DIAGNOSIS — Z00.6 RESEARCH STUDY PATIENT: ICD-10-CM

## 2024-06-18 DIAGNOSIS — I50.32 CHRONIC HEART FAILURE WITH PRESERVED EJECTION FRACTION (MULTI): ICD-10-CM

## 2024-06-18 DIAGNOSIS — N39.0 URINARY TRACT INFECTION WITHOUT HEMATURIA, SITE UNSPECIFIED: ICD-10-CM

## 2024-06-18 DIAGNOSIS — I50.32 CHRONIC HEART FAILURE WITH PRESERVED EJECTION FRACTION (MULTI): Primary | ICD-10-CM

## 2024-06-18 LAB
APPEARANCE UR: ABNORMAL
BACTERIA #/AREA URNS AUTO: ABNORMAL /HPF
BILIRUB UR STRIP.AUTO-MCNC: NEGATIVE MG/DL
COLOR UR: YELLOW
GLUCOSE UR STRIP.AUTO-MCNC: NORMAL MG/DL
KETONES UR STRIP.AUTO-MCNC: NEGATIVE MG/DL
LEUKOCYTE ESTERASE UR QL STRIP.AUTO: ABNORMAL
MUCOUS THREADS #/AREA URNS AUTO: ABNORMAL /LPF
NITRITE UR QL STRIP.AUTO: ABNORMAL
PH UR STRIP.AUTO: 5.5 [PH]
PROT UR STRIP.AUTO-MCNC: ABNORMAL MG/DL
RBC # UR STRIP.AUTO: ABNORMAL /UL
RBC #/AREA URNS AUTO: ABNORMAL /HPF
RENAL EPI CELLS #/AREA UR COMP ASSIST: ABNORMAL /HPF
SP GR UR STRIP.AUTO: 1.02
SQUAMOUS #/AREA URNS AUTO: ABNORMAL /HPF
UROBILINOGEN UR STRIP.AUTO-MCNC: NORMAL MG/DL
WBC #/AREA URNS AUTO: >50 /HPF
WBC CLUMPS #/AREA URNS AUTO: ABNORMAL /HPF

## 2024-06-18 PROCEDURE — 81001 URINALYSIS AUTO W/SCOPE: CPT

## 2024-06-18 PROCEDURE — 87086 URINE CULTURE/COLONY COUNT: CPT

## 2024-06-18 PROCEDURE — 99214 OFFICE O/P EST MOD 30 MIN: CPT | Performed by: INTERNAL MEDICINE

## 2024-06-18 PROCEDURE — 87186 SC STD MICRODIL/AGAR DIL: CPT

## 2024-06-18 RX ORDER — SPIRONOLACTONE 25 MG/1
12.5 TABLET ORAL DAILY
Qty: 45 TABLET | Refills: 3 | Status: SHIPPED | OUTPATIENT
Start: 2024-06-18 | End: 2025-06-18

## 2024-06-18 RX ORDER — TORSEMIDE 20 MG/1
20 TABLET ORAL DAILY
Qty: 90 TABLET | Refills: 3 | Status: SHIPPED | OUTPATIENT
Start: 2024-06-18 | End: 2025-06-18

## 2024-06-18 ASSESSMENT — COLUMBIA-SUICIDE SEVERITY RATING SCALE - C-SSRS
2. HAVE YOU ACTUALLY HAD ANY THOUGHTS OF KILLING YOURSELF?: NO
1. IN THE PAST MONTH, HAVE YOU WISHED YOU WERE DEAD OR WISHED YOU COULD GO TO SLEEP AND NOT WAKE UP?: NO
6. HAVE YOU EVER DONE ANYTHING, STARTED TO DO ANYTHING, OR PREPARED TO DO ANYTHING TO END YOUR LIFE?: NO

## 2024-06-18 ASSESSMENT — PATIENT HEALTH QUESTIONNAIRE - PHQ9
SUM OF ALL RESPONSES TO PHQ9 QUESTIONS 1 AND 2: 0
2. FEELING DOWN, DEPRESSED OR HOPELESS: NOT AT ALL
1. LITTLE INTEREST OR PLEASURE IN DOING THINGS: NOT AT ALL

## 2024-06-18 ASSESSMENT — PAIN SCALES - GENERAL: PAINLEVEL: 0-NO PAIN

## 2024-06-18 NOTE — PATIENT INSTRUCTIONS
Thank you for coming to see us today! To reach Dr. Nielsen's office please call [phone].  Fax [phone]. Call [phone] to schedule an appointment. You may also contact the HF RNs at [email or web address]  (Please include your name and date of birth)  For MEDICATION REFILLS, please call [phone] option [phone] then option 1.    START Spironolactone 12.5mg daily  INCREASE Torsemide to 20mg daily  Go to the lab to give a urine sample for a possible UTI   Get blood work next week (BNP, RFP)

## 2024-06-18 NOTE — PROGRESS NOTES
"Chief Complaint:   Annual Exam     History Of Present Illness:     Promise Perry is a 84 y.o. female here for follow up  she has a PMH significant for HFpEF, COPD , HTN , ANGI on CPAP , chronic Afib ( followed by Dr De La Vega )  along with venous insufficiency ( followed by Dr Rios ) .  She is reporting exertional chest pressure that is substernal and resolves on rest ( new  for her ) along with SOB and lower extremity edema , has ongoing fatigue.   Has been treated for a UTI with Bactrim a few weeks ago but reports that her urinary symptoms have returned .      Last Recorded Vitals:  Vitals:    06/18/24 0949   BP: 111/65   BP Location: Left arm   Patient Position: Sitting   BP Cuff Size: Large adult   Pulse: 85   SpO2: 94%   Weight: 113 kg (249 lb)   Height: 1.727 m (5' 8\")       Past Medical History:  She has a past medical history of Arthritis, Asthma ([facility]), CHF (congestive heart failure) (Multi), Chronic kidney disease, unspecified (07/21/2013), Chronic obstructive pulmonary disease, unspecified (Multi) (04/16/2018), Chronic obstructive pulmonary disease, unspecified (Multi) (06/18/2018), Chronic venous hypertension (idiopathic) with other complications of unspecified lower extremity (04/16/2018), Chronic venous hypertension (idiopathic) with other complications of unspecified lower extremity (06/18/2018), Diabetes mellitus (Multi), Diverticulosis of intestine, part unspecified, without perforation or abscess without bleeding (07/21/2013), Edema, unspecified (04/16/2018), Edema, unspecified (06/18/2018), Essential (primary) hypertension (12/17/2022), Essential (primary) hypertension (07/21/2013), Gastro-esophageal reflux disease without esophagitis (07/21/2013), Gastrointestinal hemorrhage, unspecified (07/21/2013), Hyperlipidemia, unspecified (07/21/2013), Hypothyroidism, unspecified (04/16/2018), Hypothyroidism, unspecified (06/18/2018), Malignant neoplasm of thyroid gland (Multi) (12/16/2022), Obesity, " Obstructive sleep apnea (adult) (pediatric) (04/16/2018), Obstructive sleep apnea (adult) (pediatric) (06/18/2018), Other disorders of electrolyte and fluid balance, not elsewhere classified (04/16/2018), Other disorders of electrolyte and fluid balance, not elsewhere classified (06/18/2018), Other fatigue (04/16/2018), Other fatigue (06/18/2018), Other forms of dyspnea (04/16/2018), Other forms of dyspnea (06/18/2018), Pain in unspecified knee (06/18/2018), Personal history of other specified conditions (04/10/2017), Pneumonia, Unspecified abdominal hernia without obstruction or gangrene, Unspecified atrial fibrillation (Multi) (12/16/2022), Unspecified diastolic (congestive) heart failure (Multi) (10/18/2022), Unspecified osteoarthritis, unspecified site (04/16/2018), and Unspecified osteoarthritis, unspecified site (06/18/2018).    Past Surgical History:  She has a past surgical history that includes Incisional breast biopsy (09/25/2013); Total thyroidectomy (09/25/2013); Esophagogastroduodenoscopy (08/28/2014); Colonoscopy (08/28/2014); Cataract extraction (03/12/2018); CT angio coronary art with heartflow if score >30% (08/15/2017); and Joint replacement.      Social History:  She reports that she quit smoking about 59 years ago. Her smoking use included cigarettes. She started smoking about 67 years ago. She has a 5 pack-year smoking history. She has never used smokeless tobacco. She reports current alcohol use of about 2.0 standard drinks of alcohol per week. She reports that she does not currently use drugs.    Family History:  Family History   Problem Relation Name Age of Onset    Hypertension Mother Esther Perry     Heart disease Mother Esther Perry     Cancer Father Pankaj Perry         Allergies:  Ceclor [cefaclor], Codeine, Lisinopril, Losartan, Quinidine, Allopurinol, Colchicine, and Erythromycin    Outpatient Medications:  Current Outpatient Medications   Medication Instructions    acetaminophen  (Tylenol) 325 mg tablet oral, Every 6 hours PRN    Allergy Relief (fexofenadine) 180 mg, oral, Daily    AmaryL 1 mg, oral, Daily    blood sugar diagnostic (OneTouch Verio test strips) strip 1 strip, subcutaneous, Daily RT    Bydureon BCise 2 mg/0.85 mL auto-injector subcutaneous, Once Weekly, Inject contents of (1) pen subcutaneously once a week as directed.    celecoxib (CELEBREX) 200 mg, oral, Daily    citalopram (CELEXA) 20 mg, oral, Daily    docusate sodium (Colace) 100 mg capsule oral, 2 times daily    fluticasone (Flonase) 50 mcg/actuation nasal spray 2 sprays, Each Nostril, Daily, Shake gently. Before first use, prime pump. After use, clean tip and replace cap.    fluticasone propion-salmeteroL (Advair Diskus) 250-50 mcg/dose diskus inhaler inhalation, Every 12 hours    hydrALAZINE (APRESOLINE) 50 mg, oral, 2 times daily    levothyroxine (SYNTHROID, LEVOXYL) 150 mcg, oral, Daily, Take 1 tablet daily for 6 days then 1/2 tab on 7th day    lisinopril 20 mg tablet 1 tablet, oral, Daily    metFORMIN XR (GLUCOPHAGE-XR) 1,500 mg, oral, Daily    metoprolol succinate XL (TOPROL-XL) 50 mg, oral, Daily    polyethylene glycol (GLYCOLAX, MIRALAX) 17 g, oral, 2 times daily    psyllium (Metamucil, sugar,) powder oral    simvastatin (ZOCOR) 40 mg, oral, Daily    torsemide (DEMADEX) 10 mg, oral, Daily    triamcinolone (Kenalog) 0.1 % cream Topical, 2 times daily, Apply to affected area 1-2 times daily as needed. Avoid face and groin.    verapamil SR (Calan-SR) 240 mg ER tablet 1 tablet, oral, Daily    warfarin (Coumadin) 5 mg tablet oral, 1 tab daily 1/2 tab sat none on sun        Physical Exam:  GEN: NAD , AOX3  HEENT : JVP elevated at 1 cm above the clavicle in a sitting position   Heart : irregularly irregular   Lungs : clear , resonant , normal air entry bilaterally   Abdomen : soft , non tender   Ext: Warm , +1 pitting edema bilaterally   Neuro : grossly intact       Last Labs:    Lab Results   Component Value Date     WBC 7.0 05/23/2024    HGB 12.1 05/23/2024    HCT 38.4 05/23/2024    MCV 91 05/23/2024     05/23/2024       CMP -  Lab Results   Component Value Date    CALCIUM 9.5 05/23/2024    PHOS 3.2 01/07/2022    PROT 6.7 01/24/2024    ALBUMIN 4.1 01/24/2024    AST 16 01/24/2024    ALT 13 01/24/2024    ALKPHOS 76 01/24/2024    BILITOT 0.7 01/24/2024       LIPID PANEL -   Lab Results   Component Value Date    CHOL 134 05/30/2023    TRIG 116 05/30/2023    HDL 46.1 05/30/2023    CHHDL 2.9 05/30/2023    LDLF 65 05/30/2023    VLDL 23 05/30/2023       RENAL FUNCTION PANEL -   Lab Results   Component Value Date    GLUCOSE 240 (H) 05/23/2024     05/23/2024    K 4.7 05/23/2024     05/23/2024    CO2 29 05/23/2024    ANIONGAP 13 05/23/2024    BUN 32 (H) 05/23/2024    CREATININE 1.47 (H) 05/23/2024    CALCIUM 9.5 05/23/2024    PHOS 3.2 01/07/2022    ALBUMIN 4.1 01/24/2024        Lab Results   Component Value Date     (H) 05/23/2024    HGBA1C 6.3 05/28/2024       Last Cardiology Tests:    Echo:  Transthoracic Echo (TTE) Complete 04/24/2024  1. Left ventricular systolic function is normal with a 65% estimated ejection fraction.   2. There is low normal right ventricular systolic function.   3. The left atrium is severely dilated.   4. The right atrium is moderately to severely dilated.   5. Slightly elevated RVSP.   6. Compared with study from 11/8/2022, no significant change.   7. The patient is in atrial fibrillation which may influence the estimate of left ventricular function and transvalvular flows.     [facility] ( 09/2019)    1. Acute on chronic diastolic HF/HFpEF.   2. Resting hemos (Ever 34, PCW 20) with exercise (Ever 51, PCW 30 with V waves of +20).   3. Return to AHF clinic for ongoing management of HFpEF.     CT chest without contrast ( 04/2024)   1.  Stable size of an irregular solid nodule within the right upper  lobe. Interval decreased thickness of a linear branching opacity  within the superior aspect of  the right lower lobe likely reflecting  resolving chronic distal bronchial mucous plugging. New ground-glass  airspace opacities of the bilateral upper lobes with tree-in-bud  nodularity as well as new 0.3 cm nodular opacities within the right  lower lobe. These constellation of findings are most compatible with  a waxing and waning infectious/inflammatory bronchiolitis. Recommend  additional short-term CT chest follow-up in 3 months for  re-evaluation.  2. Mild dilatation of the main pulmonary artery again visualized.  Correlate with history of pulmonary hypertension.  3. Biatrial cardiac enlargement.  4. Severe coronary artery calcifications.    CTA of the coronaries ( 2017)   1. Significantly limited study due to motion artifact.  2. Extensive atherosclerotic calcification in the proximal LAD  causing less than 50% stenosis. Evaluation of the distal LAD is not  possible due to significant motion.  3. Scattered calcified plaques in the proximal and mid RCA and  proximal portion of LCX with no significant stenosis. Evaluation of  distal RCA as well as mid and distal LCX is not possible due to  significant motion.  4. Moderately enlarged right and left atria.  5. Dilated main pulmonary artery. Correlation for pulmonary artery  hypertension.  6. 3 mm nodule in the left lower lobe. If the patient is at high risk  of lung malignancy, follow-up chest CT in 12 months is recommended.      Assessment/Plan    Promise Perry is a 84 y.o. female here for follow up  she has a PMH significant for HFpEF, COPD , HTN , ANGI on CPAP , chronic Afib ( followed by Dr De La Vega )  along with venous insufficiency ( followed by Dr Rios ) .She is reporting exertional chest pressure that is substernal and resolves on rest ( new  for her ) along with SOB and lower extremity edema , has ongoing fatigue. Has been treated for a UTI with Bactrim a few weeks ago but reports that her urinary symptoms have returned .    HFpEF   Hypervolemic on  examination , needs optimization   Will add spironolactone 12.5  mg daily and increase torsemide to 20 mg daily . In the past she was on spironolactone but developed hyperkalemia ( was on ACEIs at that time but no longer on it currently , unsure why it was discontinued )   Will not start a SGLT2 I in the setting of recurrent UTIs   Will plan on performing a left and right heart catheterization to assess her CAD in the setting of her new symptoms of exertional chest discomfort and also to clarify her hemodynamics and optimize her with a swan   Will wait for her UTI to be treated prior to proceeding   Obtain BNP and Renal function panel     UTI symptoms   Will obtain a urinalysis and urine culture

## 2024-06-19 ENCOUNTER — TELEPHONE (OUTPATIENT)
Dept: PRIMARY CARE | Facility: CLINIC | Age: 84
End: 2024-06-19
Payer: MEDICARE

## 2024-06-19 DIAGNOSIS — N39.0 URINARY TRACT INFECTION WITHOUT HEMATURIA, SITE UNSPECIFIED: Primary | ICD-10-CM

## 2024-06-19 RX ORDER — CEPHALEXIN 500 MG/1
500 CAPSULE ORAL 2 TIMES DAILY
Qty: 14 CAPSULE | Refills: 0 | Status: SHIPPED | OUTPATIENT
Start: 2024-06-19 | End: 2024-06-26

## 2024-06-21 LAB
BACTERIA UR CULT: ABNORMAL
BACTERIA UR CULT: ABNORMAL

## 2024-06-26 ENCOUNTER — LAB (OUTPATIENT)
Dept: LAB | Facility: LAB | Age: 84
End: 2024-06-26
Payer: MEDICARE

## 2024-06-26 ENCOUNTER — HOSPITAL ENCOUNTER (OUTPATIENT)
Dept: RESEARCH | Facility: HOSPITAL | Age: 84
Discharge: HOME | End: 2024-06-26
Payer: MEDICARE

## 2024-06-26 VITALS
DIASTOLIC BLOOD PRESSURE: 59 MMHG | SYSTOLIC BLOOD PRESSURE: 105 MMHG | WEIGHT: 244.71 LBS | BODY MASS INDEX: 37.21 KG/M2 | HEART RATE: 86 BPM

## 2024-06-26 DIAGNOSIS — Z00.6 RESEARCH STUDY PATIENT: ICD-10-CM

## 2024-06-26 LAB
CREAT SERPL-MCNC: 1.41 MG/DL (ref 0.5–1.05)
EGFRCR SERPLBLD CKD-EPI 2021: 37 ML/MIN/1.73M*2

## 2024-06-26 PROCEDURE — 82565 ASSAY OF CREATININE: CPT

## 2024-06-26 NOTE — PROGRESS NOTES
Ppt arrived alone today for Year 18 [facility] Study visit and was escorted to [facility] to be consented to Phase 5 protocol.  A copy of the consent form had been sent to the ppt prior to today's visit, to read at her leisure.  Consent form was reviewed today with participant.  Informed Consent Documentation Checklist  Protocol Title:  Chronic Renal Insufficiency Cohort (CRIC) Study Phase 5      IRB Number:  [id number]         :  Maggi Munguia MD    Patient Name & MRN: Promise Perry [id number] Study Visit Date: 06/26/2024    Date of first contact with participant regarding study: 06/23/2023     Informed Consent (ICF) Obtained by: Yue Cornelius RN     Date Signed: 06/26/2024 (Month/Day/Year)  Time Signed: 1122 ( time format)    Individuals present during the informed consent process: participant only    Date and Time research activities began: 06/26/2024 1122    Did the participant verbalize an understanding of the main purpose of the study, procedures, follow-up, risks, etc.) Yes   *If no, what additional procedures were used to ensure understanding?   (Please list):        The participant was consented in a private location, e.g. exam room, private office. Yes          *If no, what additional procedures were used to ensure privacy?   (Please list):             Were the participant's questions answered to his/her satisfaction? Yes     Were others involved in the decision making? No      If yes, who? / Relationship:      Was the participant given a current, stamped copy of the ICF? Yes     Version Date/Number: 1.1  Approval Date: 02/09/2024    ADDITIONAL DOCUMENTATION     The participant signed and dated the ICF before any study procedures were performed. Yes     The participant was given adequate time to review the ICF and ask questions. Yes         The participant was entered on the Enrollment Log. Yes     A copy of the consent form is filed in the  medical record. Yes            The current [facility] IRB consent template version is being used and appropriate signature blocks are present (i.e., LAR, next of kin, one parent, two parents, etc.) Yes              FORM COMPLETED BY: Yue Cornelius RN  DATE: 06/26/2024       Study procedures were initiated.  Contact info, HCP info and Medications were reviewed and updated.  Med Hx and Events questionnaires were completed. One event occurred since last study contact. Blood pressures and weight were obtained. Ppt  did not take BP meds prior to obtaining BP readings.     Vitals:    06/26/24 1219 06/26/24 1221 06/26/24 1223 06/26/24 1227   BP: 114/64 113/59 118/65 105/59   BP Location: Right arm Right arm Right arm Right arm   Patient Position: Sitting Sitting Sitting Sitting   BP Cuff Size: Large adult Large adult Large adult Large adult   Pulse: 73 81 71 86   Weight:    111 kg (244 lb 11.4 oz)        Urine sample (approx. 80ml) was obtained at 1233 and placed on ice. Ppt signed medical FIDENCIO and was compensated (via cash) for today's visit.  Parking was validated.  Ppt was given token study gift, and then escorted to the outpatient lab for study bloodwork. S. creatinine and 5ml red top research tube were drawn at 1310. Ppt's last food consumption was at 0900 today. Ppt was then discharged home and research tube along with urine sample were [facility] [place] [facility] [place].

## 2024-07-01 ENCOUNTER — LAB (OUTPATIENT)
Dept: LAB | Facility: LAB | Age: 84
End: 2024-07-01
Payer: MEDICARE

## 2024-07-01 ENCOUNTER — HOSPITAL ENCOUNTER (OUTPATIENT)
Facility: HOSPITAL | Age: 84
Setting detail: OUTPATIENT SURGERY
End: 2024-07-01
Attending: INTERNAL MEDICINE | Admitting: INTERNAL MEDICINE
Payer: MEDICARE

## 2024-07-01 DIAGNOSIS — I50.33 ACUTE ON CHRONIC HEART FAILURE WITH PRESERVED EJECTION FRACTION (MULTI): Primary | ICD-10-CM

## 2024-07-01 DIAGNOSIS — I50.33 ACUTE ON CHRONIC HEART FAILURE WITH PRESERVED EJECTION FRACTION (MULTI): ICD-10-CM

## 2024-07-01 DIAGNOSIS — I50.32 CHRONIC HEART FAILURE WITH PRESERVED EJECTION FRACTION (MULTI): ICD-10-CM

## 2024-07-01 LAB
ALBUMIN SERPL BCP-MCNC: 4.3 G/DL (ref 3.4–5)
ANION GAP SERPL CALC-SCNC: 14 MMOL/L (ref 10–20)
BNP SERPL-MCNC: 644 PG/ML (ref 0–99)
BUN SERPL-MCNC: 31 MG/DL (ref 6–23)
CALCIUM SERPL-MCNC: 10.2 MG/DL (ref 8.6–10.3)
CHLORIDE SERPL-SCNC: 100 MMOL/L (ref 98–107)
CO2 SERPL-SCNC: 29 MMOL/L (ref 21–32)
CREAT SERPL-MCNC: 1.58 MG/DL (ref 0.5–1.05)
EGFRCR SERPLBLD CKD-EPI 2021: 32 ML/MIN/1.73M*2
ERYTHROCYTE [DISTWIDTH] IN BLOOD BY AUTOMATED COUNT: 15.6 % (ref 11.5–14.5)
GLUCOSE SERPL-MCNC: 174 MG/DL (ref 74–99)
HCT VFR BLD AUTO: 38 % (ref 36–46)
HGB BLD-MCNC: 12 G/DL (ref 12–16)
INR PPP: 2.1 (ref 0.9–1.1)
MCH RBC QN AUTO: 28.7 PG (ref 26–34)
MCHC RBC AUTO-ENTMCNC: 31.6 G/DL (ref 32–36)
MCV RBC AUTO: 91 FL (ref 80–100)
NRBC BLD-RTO: 0 /100 WBCS (ref 0–0)
PHOSPHATE SERPL-MCNC: 3.6 MG/DL (ref 2.5–4.9)
PLATELET # BLD AUTO: 225 X10*3/UL (ref 150–450)
POTASSIUM SERPL-SCNC: 5 MMOL/L (ref 3.5–5.3)
PROTHROMBIN TIME: 23.3 SECONDS (ref 9.8–12.8)
RBC # BLD AUTO: 4.18 X10*6/UL (ref 4–5.2)
SODIUM SERPL-SCNC: 138 MMOL/L (ref 136–145)
WBC # BLD AUTO: 7 X10*3/UL (ref 4.4–11.3)

## 2024-07-01 PROCEDURE — 85027 COMPLETE CBC AUTOMATED: CPT

## 2024-07-01 PROCEDURE — 85610 PROTHROMBIN TIME: CPT

## 2024-07-01 PROCEDURE — 36415 COLL VENOUS BLD VENIPUNCTURE: CPT

## 2024-07-01 PROCEDURE — 80069 RENAL FUNCTION PANEL: CPT

## 2024-07-01 PROCEDURE — 83880 ASSAY OF NATRIURETIC PEPTIDE: CPT

## 2024-07-02 DIAGNOSIS — N17.9 ACUTE RENAL FAILURE, UNSPECIFIED ACUTE RENAL FAILURE TYPE (CMS-HCC): Primary | ICD-10-CM

## 2024-07-10 ENCOUNTER — LAB (OUTPATIENT)
Dept: LAB | Facility: LAB | Age: 84
End: 2024-07-10
Payer: MEDICARE

## 2024-07-10 DIAGNOSIS — N17.9 ACUTE RENAL FAILURE, UNSPECIFIED ACUTE RENAL FAILURE TYPE (CMS-HCC): ICD-10-CM

## 2024-07-10 LAB
ALBUMIN SERPL BCP-MCNC: 4 G/DL (ref 3.4–5)
ANION GAP SERPL CALC-SCNC: 14 MMOL/L (ref 10–20)
BUN SERPL-MCNC: 30 MG/DL (ref 6–23)
CALCIUM SERPL-MCNC: 9.6 MG/DL (ref 8.6–10.3)
CHLORIDE SERPL-SCNC: 103 MMOL/L (ref 98–107)
CO2 SERPL-SCNC: 26 MMOL/L (ref 21–32)
CREAT SERPL-MCNC: 1.42 MG/DL (ref 0.5–1.05)
EGFRCR SERPLBLD CKD-EPI 2021: 37 ML/MIN/1.73M*2
GLUCOSE SERPL-MCNC: 205 MG/DL (ref 74–99)
PHOSPHATE SERPL-MCNC: 3.9 MG/DL (ref 2.5–4.9)
POTASSIUM SERPL-SCNC: 4.9 MMOL/L (ref 3.5–5.3)
SODIUM SERPL-SCNC: 138 MMOL/L (ref 136–145)

## 2024-07-10 PROCEDURE — 80069 RENAL FUNCTION PANEL: CPT

## 2024-07-18 ENCOUNTER — TELEPHONE (OUTPATIENT)
Dept: CARDIOLOGY | Facility: HOSPITAL | Age: 84
End: 2024-07-18
Payer: MEDICARE

## 2024-07-18 NOTE — TELEPHONE ENCOUNTER
Rn coordinator called patient to discuss rescheduling R/L heart cath, zarina at date 8/2, pt agreed. Reviewed medications instructions and sent instructions via my chart. Pt will take last dose of coumadin on 7/27.pt verbalized understanding. Message sent to Dr Iqbal;s admin to schedule CATH,a nd also message sent to Dr Godinez for date as pt will be admitted afterwards.

## 2024-07-23 DIAGNOSIS — I50.30 (HFPEF) HEART FAILURE WITH PRESERVED EJECTION FRACTION (MULTI): Primary | ICD-10-CM

## 2024-07-25 ENCOUNTER — LAB (OUTPATIENT)
Dept: LAB | Facility: LAB | Age: 84
End: 2024-07-25
Payer: MEDICARE

## 2024-07-25 DIAGNOSIS — I50.30 (HFPEF) HEART FAILURE WITH PRESERVED EJECTION FRACTION (MULTI): ICD-10-CM

## 2024-07-25 LAB
INR PPP: 1.9 (ref 0.9–1.1)
PROTHROMBIN TIME: 21.5 SECONDS (ref 9.8–12.8)

## 2024-07-25 PROCEDURE — 85610 PROTHROMBIN TIME: CPT

## 2024-07-26 ENCOUNTER — HOSPITAL ENCOUNTER (OUTPATIENT)
Facility: HOSPITAL | Age: 84
Setting detail: OUTPATIENT SURGERY
Discharge: HOME | End: 2024-07-26
Attending: INTERNAL MEDICINE | Admitting: INTERNAL MEDICINE
Payer: MEDICARE

## 2024-07-26 VITALS — WEIGHT: 250 LBS | HEIGHT: 68 IN | BODY MASS INDEX: 37.89 KG/M2

## 2024-07-26 DIAGNOSIS — R06.02 SHORTNESS OF BREATH: ICD-10-CM

## 2024-07-26 DIAGNOSIS — E11.22 TYPE 2 DIABETES MELLITUS WITH CHRONIC KIDNEY DISEASE, WITHOUT LONG-TERM CURRENT USE OF INSULIN, UNSPECIFIED CKD STAGE (MULTI): ICD-10-CM

## 2024-07-26 DIAGNOSIS — R07.9 CHEST PAIN, UNSPECIFIED: ICD-10-CM

## 2024-07-26 LAB
ACT BLD: 276 SEC (ref 83–199)
ACT BLD: 339 SEC (ref 83–199)
ACT BLD: NORMAL S

## 2024-07-26 PROCEDURE — 92978 ENDOLUMINL IVUS OCT C 1ST: CPT | Performed by: INTERNAL MEDICINE

## 2024-07-26 PROCEDURE — C1769 GUIDE WIRE: HCPCS | Performed by: INTERNAL MEDICINE

## 2024-07-26 PROCEDURE — 92978 ENDOLUMINL IVUS OCT C 1ST: CPT | Mod: LD | Performed by: INTERNAL MEDICINE

## 2024-07-26 PROCEDURE — C1760 CLOSURE DEV, VASC: HCPCS | Performed by: INTERNAL MEDICINE

## 2024-07-26 PROCEDURE — 93456 R HRT CORONARY ARTERY ANGIO: CPT | Performed by: INTERNAL MEDICINE

## 2024-07-26 PROCEDURE — 2720000007 HC OR 272 NO HCPCS: Performed by: INTERNAL MEDICINE

## 2024-07-26 PROCEDURE — 85347 COAGULATION TIME ACTIVATED: CPT

## 2024-07-26 PROCEDURE — C1887 CATHETER, GUIDING: HCPCS | Performed by: INTERNAL MEDICINE

## 2024-07-26 PROCEDURE — 99153 MOD SED SAME PHYS/QHP EA: CPT | Performed by: INTERNAL MEDICINE

## 2024-07-26 PROCEDURE — 99152 MOD SED SAME PHYS/QHP 5/>YRS: CPT | Performed by: INTERNAL MEDICINE

## 2024-07-26 PROCEDURE — 7100000010 HC PHASE TWO TIME - EACH INCREMENTAL 1 MINUTE: Performed by: INTERNAL MEDICINE

## 2024-07-26 PROCEDURE — 2550000001 HC RX 255 CONTRASTS: Performed by: INTERNAL MEDICINE

## 2024-07-26 PROCEDURE — 85347 COAGULATION TIME ACTIVATED: CPT | Performed by: INTERNAL MEDICINE

## 2024-07-26 PROCEDURE — C1894 INTRO/SHEATH, NON-LASER: HCPCS | Performed by: INTERNAL MEDICINE

## 2024-07-26 PROCEDURE — 2500000004 HC RX 250 GENERAL PHARMACY W/ HCPCS (ALT 636 FOR OP/ED): Performed by: INTERNAL MEDICINE

## 2024-07-26 PROCEDURE — 2500000005 HC RX 250 GENERAL PHARMACY W/O HCPCS: Performed by: INTERNAL MEDICINE

## 2024-07-26 PROCEDURE — C1727 CATH, BAL TIS DIS, NON-VAS: HCPCS | Performed by: INTERNAL MEDICINE

## 2024-07-26 PROCEDURE — G0269 OCCLUSIVE DEVICE IN VEIN ART: HCPCS | Mod: TC,59 | Performed by: INTERNAL MEDICINE

## 2024-07-26 PROCEDURE — 96373 THER/PROPH/DIAG INJ IA: CPT | Performed by: INTERNAL MEDICINE

## 2024-07-26 PROCEDURE — 7100000009 HC PHASE TWO TIME - INITIAL BASE CHARGE: Performed by: INTERNAL MEDICINE

## 2024-07-26 PROCEDURE — 2780000003 HC OR 278 NO HCPCS: Performed by: INTERNAL MEDICINE

## 2024-07-26 RX ORDER — PROTAMINE SULFATE 10 MG/ML
INJECTION, SOLUTION INTRAVENOUS CONTINUOUS PRN
Status: COMPLETED | OUTPATIENT
Start: 2024-07-26 | End: 2024-07-26

## 2024-07-26 RX ORDER — HEPARIN SODIUM 1000 [USP'U]/ML
INJECTION, SOLUTION INTRAVENOUS; SUBCUTANEOUS AS NEEDED
Status: DISCONTINUED | OUTPATIENT
Start: 2024-07-26 | End: 2024-07-26 | Stop reason: HOSPADM

## 2024-07-26 RX ORDER — CHOLECALCIFEROL (VITAMIN D3) 50 MCG
50 TABLET ORAL DAILY
COMMUNITY

## 2024-07-26 RX ORDER — FENTANYL CITRATE 50 UG/ML
INJECTION, SOLUTION INTRAMUSCULAR; INTRAVENOUS AS NEEDED
Status: DISCONTINUED | OUTPATIENT
Start: 2024-07-26 | End: 2024-07-26 | Stop reason: HOSPADM

## 2024-07-26 RX ORDER — NITROGLYCERIN 40 MG/100ML
INJECTION INTRAVENOUS AS NEEDED
Status: DISCONTINUED | OUTPATIENT
Start: 2024-07-26 | End: 2024-07-26 | Stop reason: HOSPADM

## 2024-07-26 RX ORDER — VERAPAMIL HYDROCHLORIDE 2.5 MG/ML
INJECTION, SOLUTION INTRAVENOUS AS NEEDED
Status: DISCONTINUED | OUTPATIENT
Start: 2024-07-26 | End: 2024-07-26 | Stop reason: HOSPADM

## 2024-07-26 RX ORDER — MIDAZOLAM HYDROCHLORIDE 1 MG/ML
INJECTION, SOLUTION INTRAMUSCULAR; INTRAVENOUS AS NEEDED
Status: DISCONTINUED | OUTPATIENT
Start: 2024-07-26 | End: 2024-07-26 | Stop reason: HOSPADM

## 2024-07-26 RX ORDER — LIDOCAINE HYDROCHLORIDE 20 MG/ML
INJECTION, SOLUTION INFILTRATION; PERINEURAL AS NEEDED
Status: DISCONTINUED | OUTPATIENT
Start: 2024-07-26 | End: 2024-07-26 | Stop reason: HOSPADM

## 2024-07-26 RX ORDER — METFORMIN HYDROCHLORIDE 500 MG/1
1500 TABLET, EXTENDED RELEASE ORAL DAILY
Qty: 90 TABLET | Refills: 11 | Status: SHIPPED | OUTPATIENT
Start: 2024-07-28 | End: 2025-07-28

## 2024-07-26 RX ORDER — UBIDECARENONE 75 MG
500 CAPSULE ORAL DAILY
COMMUNITY

## 2024-07-26 RX ORDER — SODIUM CHLORIDE 9 MG/ML
125 INJECTION, SOLUTION INTRAVENOUS CONTINUOUS
Status: SHIPPED | OUTPATIENT
Start: 2024-07-26 | End: 2024-07-26

## 2024-07-26 ASSESSMENT — ENCOUNTER SYMPTOMS
NAUSEA: 0
CONFUSION: 0
CHILLS: 0
DIZZINESS: 0
EYE REDNESS: 0
SINUS PAIN: 0
SHORTNESS OF BREATH: 1
LIGHT-HEADEDNESS: 0
VOMITING: 0
DIARRHEA: 1
FEVER: 0
ABDOMINAL PAIN: 0
BACK PAIN: 0
AGITATION: 0
PALPITATIONS: 1
DYSURIA: 0

## 2024-07-26 NOTE — POST-PROCEDURE NOTE
Physician Transition of Care Summary  Invasive Cardiovascular Lab    Procedure Date: 7/26/2024  Attending:    * Kwabena Iqbal - Primary  Resident/Fellow/Other Assistant: Surgeons and Role:  * No surgeons found with a matching role *  Fernandez Martínez    Indications:   * No Diagnosis Codes entered *  Chest pain  Shortness of breath on exertion.    Post-procedure diagnosis:   * No Diagnosis Codes entered *    Procedure(s):   Left & Right Heart Cath w Angiography & LV  45685 - [place] R & L HRT CATH WINJX HRT ART& L VENTR IMG    FFR (Fractional Flow Reserve)        Procedure Findings:   Diffuse mild calcific disease.  Mid LAD lesion that angiographically appears to be 50-60% stenotic, FFR negative.   RHC with normal filling pressures with normal CO and CI.    Description of the Procedure:   There was difficult engagement of the left coronary and that necessitate switching to right femoral access.  LM: Normal.  LAD: Moderate mid LAD lesion (50-60%), RFFR was 0.95, adenosine induced was 0.85  Lcx: Mild luminal irregularities.  RCA: Prox RCA lesion that appears <30% stenotic.     RHC:  RA: 6  RV 37/1 (6)  PA 38/14 (24)  PCWP 11      Complications:   None    Stents/Implants:   Implants       No implant documentation for this case.            Anticoagulation/Antiplatelet Plan:   Continue home meds.    Estimated Blood Loss:   0 mL    Anesthesia: Moderate Sedation Anesthesia Staff: No anesthesia staff entered.    Any Specimen(s) Removed:   No specimens collected during this procedure.    Disposition:   Home      Electronically signed by: Stiven Martínez MD, 7/26/2024 11:13 AM

## 2024-07-26 NOTE — Clinical Note
Sheath was exchanged in the right femoral artery with ACCESS KIT, S-KINDRA MINI, 4FR 10CM 0.018IN 40CM, NT/PT, ECHO ENHANCE NEEDLE.

## 2024-07-26 NOTE — H&P
History Of Present Illness  Promise Perry is a 84 y.o. female with PMH significant for HFpEF, COPD, HTN , ANGI on CPAP, chronic Afib (on coumadin), venous insufficiency who is undergoing elective L/RHC. Patient recently saw Dr Michael Yun who felt patient was hypervolemic requiring optimization; delayed as patient was recently treated for UTI. Patient also reports new intermittent chest pressure that occurs at rest or with exertion; the pressure usually last minutes but no longer than an hour and resolves on own; no aggravating or alleviating factors, has never taken SL nitro. Patient also reports HASKINS that resolves with rest. Also reports difficulty sleeping flat and uses a CPAP at night but willing to try to lay flat for cath today. Currently, at rest denies SOB or chest pressure.      Past Medical History  Past Medical History:   Diagnosis Date    Arthritis     Asthma ([facility])     CHF (congestive heart failure) (Multi)     Chronic kidney disease, unspecified 07/21/2013    Chronic kidney disease    Chronic obstructive pulmonary disease, unspecified (Multi) 04/16/2018    Chronic obstructive pulmonary disease    Chronic obstructive pulmonary disease, unspecified (Multi) 06/18/2018    Chronic obstructive pulmonary disease    Chronic venous hypertension (idiopathic) with other complications of unspecified lower extremity 04/16/2018    Chronic venous hypertension with complication    Chronic venous hypertension (idiopathic) with other complications of unspecified lower extremity 06/18/2018    Chronic venous hypertension with complication    Diabetes mellitus (Multi)     Diverticulosis of intestine, part unspecified, without perforation or abscess without bleeding 07/21/2013    Diverticulosis    Edema, unspecified 04/16/2018    Edema    Edema, unspecified 06/18/2018    Edema    Essential (primary) hypertension 12/17/2022    Hypertension    Essential (primary) hypertension 07/21/2013    Hypertension    Gastro-esophageal  reflux disease without esophagitis 07/21/2013    Esophageal reflux    Gastrointestinal hemorrhage, unspecified 07/21/2013    Gastrointestinal bleeding    Hyperlipidemia, unspecified 07/21/2013    Hyperlipidemia    Hypothyroidism, unspecified 04/16/2018    Hypothyroidism    Hypothyroidism, unspecified 06/18/2018    Hypothyroidism    Malignant neoplasm of thyroid gland (Multi) 12/16/2022    Thyroid cancer    Obesity     Obstructive sleep apnea (adult) (pediatric) 04/16/2018    Sleep apnea, obstructive    Obstructive sleep apnea (adult) (pediatric) 06/18/2018    Sleep apnea, obstructive    Other disorders of electrolyte and fluid balance, not elsewhere classified 04/16/2018    Electrolyte and fluid disorder    Other disorders of electrolyte and fluid balance, not elsewhere classified 06/18/2018    Electrolyte and fluid disorder    Other fatigue 04/16/2018    Fatigue    Other fatigue 06/18/2018    Fatigue    Other forms of dyspnea 04/16/2018    Dyspnea on effort    Other forms of dyspnea 06/18/2018    Dyspnea on effort    Pain in unspecified knee 06/18/2018    Joint pain, knee    Personal history of other specified conditions 04/10/2017    History of fatigue    Pneumonia     Unspecified abdominal hernia without obstruction or gangrene     Hernia    Unspecified atrial fibrillation (Multi) 12/16/2022    Atrial fibrillation    Unspecified diastolic (congestive) heart failure (Multi) 10/18/2022    (HFpEF) heart failure with preserved ejection fraction    Unspecified osteoarthritis, unspecified site 04/16/2018    Arthritis    Unspecified osteoarthritis, unspecified site 06/18/2018    Arthritis       Surgical History  Past Surgical History:   Procedure Laterality Date    CATARACT EXTRACTION  03/12/2018    Cataract Extraction    COLONOSCOPY  08/28/2014    Complete Colonoscopy    CT ANGIO CORONARY ART WITH HEARTFLOW IF SCORE >30%  08/15/2017    CT HEART CORONARY ANGIOGRAM 8/15/2017 [facility] ANCILLARY LEGACY     ESOPHAGOGASTRODUODENOSCOPY  08/28/2014    Diagnostic Esophagogastroduodenoscopy    INCISIONAL BREAST BIOPSY  09/25/2013    Incisional Breast Biopsy    JOINT REPLACEMENT      TOTAL THYROIDECTOMY  09/25/2013    Thyroid Surgery Total Thyroidectomy        Social History  She reports that she quit smoking about 59 years ago. Her smoking use included cigarettes. She started smoking about 67 years ago. She has a 5 pack-year smoking history. She has never used smokeless tobacco. She reports current alcohol use of about 2.0 standard drinks of alcohol per week. She reports that she does not currently use drugs.    Family History  Family History   Problem Relation Name Age of Onset    Hypertension Mother Esther Perry     Heart disease Mother Esther Perry     Cancer Father Pankaj Perry         Allergies  Ceclor [cefaclor], Codeine, Lisinopril, Losartan, Quinidine, Allopurinol, Colchicine, and Erythromycin    Review of Systems   Constitutional:  Negative for chills and fever.   HENT:  Negative for sinus pain.    Eyes:  Negative for redness and visual disturbance.   Respiratory:  Positive for shortness of breath.    Cardiovascular:  Positive for chest pain, palpitations and leg swelling.   Gastrointestinal:  Positive for diarrhea. Negative for abdominal pain, nausea and vomiting.   Genitourinary:  Negative for dysuria.   Musculoskeletal:  Negative for back pain.   Neurological:  Negative for dizziness, syncope and light-headedness.   Psychiatric/Behavioral:  Negative for agitation and confusion.         Physical Exam  Constitutional:       Appearance: Normal appearance.   HENT:      Mouth/Throat:      Mouth: Mucous membranes are moist.   Cardiovascular:      Rate and Rhythm: Tachycardia present. Rhythm irregular.   Pulmonary:      Effort: Pulmonary effort is normal.      Breath sounds: Normal breath sounds.   Abdominal:      Palpations: Abdomen is soft.      Tenderness: There is no abdominal tenderness.   Musculoskeletal:  "     Right lower leg: Edema present.      Left lower leg: Edema present.   Skin:     General: Skin is warm and dry.   Neurological:      General: No focal deficit present.      Mental Status: She is alert.   Psychiatric:         Mood and Affect: Mood normal.         Behavior: Behavior normal.          Last Recorded Vitals  Height 1.727 m (5' 8\"), weight 113 kg (250 lb).    Relevant Results      Results for orders placed or performed in visit on 07/25/24 (from the past 24 hour(s))   Protime-INR   Result Value Ref Range    Protime 21.5 (H) 9.8 - 12.8 seconds    INR 1.9 (H) 0.9 - 1.1     Scheduled medications     Continuous medications  sodium chloride 0.9%, 125 mL/hr      PRN medications    No current facility-administered medications on file prior to encounter.     Current Outpatient Medications on File Prior to Encounter   Medication Sig Dispense Refill    acetaminophen (Tylenol) 325 mg tablet Take 1,000 mg by mouth every 6 hours if needed.      Allergy Relief, fexofenadine, 180 mg tablet Take 1 tablet (180 mg) by mouth once daily.      AmaryL 1 mg tablet Take 1 tablet (1 mg) by mouth once daily. 90 tablet 3    blood sugar diagnostic (OneTouch Verio test strips) strip Inject 1 strip under the skin once daily. 100 strip 3    Bydureon BCise 2 mg/0.85 mL auto-injector INJECT THE CONTENTS OF ONE PEN  SUBCUTANEOUSLY WEEKLY AS  DIRECTED 10.2 mL 3    celecoxib (CeleBREX) 200 mg capsule TAKE 1 CAPSULE BY MOUTH ONCE  DAILY 90 capsule 3    citalopram (CeleXA) 20 mg tablet Take 1 tablet (20 mg) by mouth once daily. 90 tablet 3    docusate sodium (Colace) 100 mg capsule Take by mouth twice a day.      fluticasone (Flonase) 50 mcg/actuation nasal spray Administer 2 sprays into each nostril once daily. Shake gently. Before first use, prime pump. After use, clean tip and replace cap. 16 g 2    fluticasone propion-salmeteroL (Advair Diskus) 250-50 mcg/dose diskus inhaler Inhale every 12 hours.      hydrALAZINE (Apresoline) 50 mg " tablet TAKE 1 TABLET BY MOUTH TWICE  DAILY 180 tablet 3    levothyroxine (Synthroid, Levoxyl) 150 mcg tablet Take 1 tablet (150 mcg) by mouth once daily. Take 1 tablet daily for 6 days then 1/2 tab on 7th day 90 tablet 3    lisinopril 20 mg tablet Take 1 tablet (20 mg) by mouth once daily.      metFORMIN  mg 24 hr tablet Take 3 tablets (1,500 mg) by mouth once daily. 270 tablet 3    metoprolol succinate XL (Toprol-XL) 50 mg 24 hr tablet Take 1 tablet (50 mg) by mouth once daily. 50mg qam; 25mg qpm      polyethylene glycol (Glycolax, Miralax) 17 gram/dose powder Take 17 g by mouth twice a day.      psyllium (Metamucil, sugar,) powder Take by mouth.      simvastatin (Zocor) 40 mg tablet Take 1 tablet (40 mg) by mouth once daily. 90 tablet 3    spironolactone (Aldactone) 25 mg tablet Take 0.5 tablets (12.5 mg) by mouth once daily. 45 tablet 3    torsemide (Demadex) 20 mg tablet Take 1 tablet (20 mg) by mouth once daily. (Patient taking differently: Take 2 tablets (40 mg) by mouth once daily.) 90 tablet 3    triamcinolone (Kenalog) 0.1 % cream Apply topically 2 times a day. Apply to affected area 1-2 times daily as needed. Avoid face and groin. 80 g 1    verapamil SR (Calan-SR) 240 mg ER tablet Take 1 tablet (240 mg) by mouth once daily. 90 tablet 3    warfarin (Coumadin) 5 mg tablet Take by mouth. 1 tab daily 1/2 tab sat none on sun         Lab Results   Component Value Date    GLUCOSE 205 (H) 07/10/2024    CALCIUM 9.6 07/10/2024     07/10/2024    K 4.9 07/10/2024    CO2 26 07/10/2024     07/10/2024    BUN 30 (H) 07/10/2024    CREATININE 1.42 (H) 07/10/2024      Lab Results   Component Value Date    WBC 7.0 07/01/2024    HGB 12.0 07/01/2024    HCT 38.0 07/01/2024    MCV 91 07/01/2024     07/01/2024            Assessment/Plan   Active Problems:  There are no active Hospital Problems.    Promise Perry is a 84 y.o. female with PMH significant for HFpEF, COPD, HTN , ANGI on CPAP, chronic Afib (on  coumadin), venous insufficiency who is undergoing elective L/RHC.     L/RHC today with Dr Iqbal  - last dose Coumadin Tuesday, last INR yesterday 7/25 at 1.9  - potential leave in [place] pending numbers, patient aware   - fluids ordered for last GFR 37    I spent 30 minutes in the professional and overall care of this patient.      Casandra Padilla, APRN-CNP

## 2024-07-26 NOTE — PROGRESS NOTES
Pharmacy Medication History Review    Promise Perry is a 84 y.o. female admitted for No Principal Problem: There is no principal problem currently on the Problem List. Please update the Problem List and refresh.. Pharmacy reviewed the patient's [id number] medications and allergies for accuracy.    Medications ADDED:  CYANOCOBALAMIN  CHOLECALCIFEROL    Medications CHANGED:  N/A  Medications REMOVED:   N/A     The list below reflects the updated PTA list. Comments regarding how patient may be taking medications differently can be found in the Admit Orders Activity  Prior to Admission Medications   Prescriptions Last Dose Informant Patient Reported?   AmaryL 1 mg tablet 7/25/2024 Self No   Sig: Take 1 tablet (1 mg) by mouth once daily.   Bydureon BCise 2 mg/0.85 mL auto-injector 7/21/2024 Self No   Sig: INJECT THE CONTENTS OF ONE PEN  SUBCUTANEOUSLY WEEKLY AS  DIRECTED   acetaminophen (Tylenol) 325 mg tablet Unknown Self Yes   Sig: Take 1,000 mg by mouth every 6 hours if needed.   blood sugar diagnostic (OneTouch Verio test strips) strip Unknown Self No   Sig: Inject 1 strip under the skin once daily.   celecoxib (CeleBREX) 200 mg capsule 7/25/2024 Self No   Sig: TAKE 1 CAPSULE BY MOUTH ONCE  DAILY   cholecalciferol (Vitamin D-3) 50 MCG (2000 UT) tablet 7/25/2024 Self Yes   Sig: Take 1 tablet (50 mcg) by mouth once daily.   citalopram (CeleXA) 20 mg tablet Past Week Self No   Sig: Take 1 tablet (20 mg) by mouth once daily.   cyanocobalamin (Vitamin B-12) 500 mcg tablet 7/25/2024 Self Yes   Sig: Take 1 tablet (500 mcg) by mouth once daily.   fluticasone (Flonase) 50 mcg/actuation nasal spray Unknown Self No   Sig: Administer 2 sprays into each nostril once daily. Shake gently. Before first use, prime pump. After use, clean tip and replace cap.   hydrALAZINE (Apresoline) 50 mg tablet 7/25/2024 Self No   Sig: TAKE 1 TABLET BY MOUTH TWICE  DAILY   levothyroxine (Synthroid, Levoxyl) 150 mcg tablet 7/25/2024 Self  No   Sig: Take 1 tablet (150 mcg) by mouth once daily. Take 1 tablet daily for 6 days then 1/2 tab on 7th day   metFORMIN  mg 24 hr tablet 7/25/2024 Self No   Sig: Take 3 tablets (1,500 mg) by mouth once daily.   metoprolol succinate XL (Toprol-XL) 50 mg 24 hr tablet 7/25/2024 Self Yes   Sig: Take 1 tablet (50 mg) by mouth once daily. 50mg qam; 25mg qpm   simvastatin (Zocor) 40 mg tablet 7/25/2024 Self No   Sig: Take 1 tablet (40 mg) by mouth once daily.   spironolactone (Aldactone) 25 mg tablet 7/25/2024 Self No   Sig: Take 0.5 tablets (12.5 mg) by mouth once daily.   torsemide (Demadex) 20 mg tablet 7/25/2024 Self No   Sig: Take 1 tablet (20 mg) by mouth once daily.   triamcinolone (Kenalog) 0.1 % cream More than a month Self No   Sig: Apply topically 2 times a day. Apply to affected area 1-2 times daily as needed. Avoid face and groin.   Patient not taking: Reported on 7/26/2024   verapamil SR (Calan-SR) 240 mg ER tablet 7/25/2024 Self No   Sig: Take 1 tablet (240 mg) by mouth once daily.   warfarin (Coumadin) 5 mg tablet 7/23/2024 Self Yes   Sig: Take by mouth. 1 tab daily 1/2 tab sat none on sun      Facility-Administered Medications: None        The list below reflects the updated allergy list. Please review each documented allergy for additional clarification and justification.  Allergies  Reviewed by Denys Trivedi RN on 7/26/2024        Severity Reactions Comments    Ceclor [cefaclor] Not Specified Unknown     Codeine Not Specified Nausea Only     Lisinopril Not Specified Other severve hypotension    Losartan Not Specified Unknown     Quinidine Not Specified Unknown     Allopurinol Low Rash     Colchicine Low Rash     Erythromycin Low Rash             Patient declines M2B at discharge.     Sources used to complete the med history include out patient fill history, OARRS, and patient interview along with 6/18/24 progress note cardiology chronic heart failure Dr. DINAH Estrada.       Below are additional  concerns with the patient's PTA list.      Jim Garcia [facility]  Transitions of Care Clinical Pharmacist  Please reach out via Epic Chat for questions, if no response call  [phone] or [facility]

## 2024-07-26 NOTE — DISCHARGE INSTRUCTIONS
CARDIAC CATHETERIZATION DISCHARGE INSTRUCTIONS (procedure done on 7/26/24)     FOR SUDDEN AND SEVERE CHEST PAIN, SHORTNESS OF BREATH, EXCESSIVE BLEEDING, SIGNS OF STROKE, OR CHANGES IN MENTAL STATUS YOU SHOULD CALL 911 IMMEDIATELY.     If your provider has prescribed aspirin and/or clopidogrel (Plavix), or prasugrel (Effient), or ticagrelor (Brilinta), DO NOT STOP THESE MEDICATIONS for any reason without talking to your cardiologist first. If any of these were prescribed, you must take them every day without missing a single dose. If you are getting low on these medications, contact your provider immediately for a refill.     FOR NEXT 24 HOURS  - Upon discharge, you should return home and rest for the remainder of the day and evening. You do not have to stay on bed rest but should not be very active.  It is recommended a responsible adult be with you for the first 24 hours after the procedure.    - No driving for 24 hours after procedure. Please arrange for someone to drive you home from the hospital today.     - Do not drive, operate machinery, or use power tools for 24 hours after your procedure.     - Do not make any legal decisions for 24 hours after your procedure.     - Do not drink alcoholic beverages for 24 hours after your procedure.    WOUND CARE   *FOR FEMORAL (LEG) ACCESS*  ·      Avoid heavy lifting (over 10 pounds) for 7 days, squatting or excessive bending for 2 days, and strenuous exercise for 7 days.  ·      No submerged bathing, swimming, or hot tubs for the next 7 days, or until fully healed.  ·      Avoid sexual activity for 3-4 days until any groin discomfort has ceased.     *FOR RADIAL (WRIST) ACCESS*  ·      No lifting more than 5 pounds or excessive use of the wrist for 24 hours - for example, treat your wrist as if it is sprained.  ·      Do not engage in vigorous activities (tennis, golf, bowling, weights) for at least 48 hours after the procedure.  ·      Do not submerge the wrist  for 7 days after the procedure.  ·      You should expect mild tingling in your hand and tenderness at the puncture site for up to 3 days.    - The transparent dressing should be removed from the site 24 hours after the procedure.  Wash the site gently with soap and water. Rinse well and pat dry. Keep the area clean and dry. You may apply a Band-Aid to the site. Avoid lotions, ointments, or powders until fully healed.     - You may shower the day after your procedure.      - It is normal to notice a small bruise around the puncture site and/or a small grape sized or smaller lump. Any large bruising or large lump warrants a call to the office.     - If bleeding should occur, lay down and apply pressure to the affected area for 10 minutes.  If the bleeding stops notify your physician.  If there is a large amount of bleeding or spurting of blood CALL 911 immediately.  DO NOT drive yourself to the hospital.    - You may experience some tenderness, bruising or minimal inflammation.  If you have any concerns, you may contact the Cath Lab or if any of these symptoms become excessive, contact your cardiologist or go to the emergency room.     OTHER INSTRUCTIONS  - You may take acetaminophen (Tylenol) as directed for discomfort.  If pain is not relieved with acetaminophen (Tylenol), contact your doctor.    - If you notice or experience any of the following, you should notify your doctor or seek medical attention  Chest pain or discomfort  Change in mental status or weakness in extremities.  Dizziness, light headedness, or feeling faint.  Change in the site where the procedure was performed, such as bleeding or an increased area of bruising or swelling.  Tingling, numbness, pain, or coolness in the leg/arm beyond the site where the procedure was performed.  Signs of infection (i.e. shaking chills, temperature > 100 degrees Fahrenheit, warmth, redness) in the leg/arm area where the procedure was performed.  Changes in  urination   Bloody or black stools  Vomiting blood  Severe nose bleeds  Any excessive bleeding    - If you DO NOT have an appointment with your cardiologist within 2-4 weeks following your procedure, please contact their office.

## 2024-07-26 NOTE — Clinical Note
Sheath was exchanged in the right internal jugular vein with ACCESS KIT, S-KINDRA MINI, 4FR 10CM 0.018IN 40CM, NT/PT, ECHO ENHANCE NEEDLE.

## 2024-07-26 NOTE — Clinical Note
Closure device placed in the right radial artery. Site closed by radial compression system. TR band- 13ml air for hemostasis

## 2024-08-08 ENCOUNTER — LAB (OUTPATIENT)
Dept: LAB | Facility: LAB | Age: 84
End: 2024-08-08
Payer: MEDICARE

## 2024-08-08 DIAGNOSIS — E55.9 VITAMIN D DEFICIENCY: ICD-10-CM

## 2024-08-08 DIAGNOSIS — E11.22 TYPE 2 DIABETES MELLITUS WITH CHRONIC KIDNEY DISEASE, WITHOUT LONG-TERM CURRENT USE OF INSULIN, UNSPECIFIED CKD STAGE (MULTI): ICD-10-CM

## 2024-08-08 DIAGNOSIS — I48.91 ATRIAL FIBRILLATION, UNSPECIFIED TYPE (MULTI): ICD-10-CM

## 2024-08-08 LAB
25(OH)D3 SERPL-MCNC: 46 NG/ML (ref 30–100)
ALBUMIN SERPL BCP-MCNC: 4.1 G/DL (ref 3.4–5)
ALP SERPL-CCNC: 54 U/L (ref 33–136)
ALT SERPL W P-5'-P-CCNC: 7 U/L (ref 7–45)
ANION GAP SERPL CALC-SCNC: 13 MMOL/L (ref 10–20)
AST SERPL W P-5'-P-CCNC: 11 U/L (ref 9–39)
BILIRUB SERPL-MCNC: 0.8 MG/DL (ref 0–1.2)
BUN SERPL-MCNC: 30 MG/DL (ref 6–23)
CALCIUM SERPL-MCNC: 10.1 MG/DL (ref 8.6–10.3)
CHLORIDE SERPL-SCNC: 100 MMOL/L (ref 98–107)
CO2 SERPL-SCNC: 28 MMOL/L (ref 21–32)
CREAT SERPL-MCNC: 1.54 MG/DL (ref 0.5–1.05)
EGFRCR SERPLBLD CKD-EPI 2021: 33 ML/MIN/1.73M*2
ERYTHROCYTE [DISTWIDTH] IN BLOOD BY AUTOMATED COUNT: 14.9 % (ref 11.5–14.5)
EST. AVERAGE GLUCOSE BLD GHB EST-MCNC: 134 MG/DL
GLUCOSE SERPL-MCNC: 110 MG/DL (ref 74–99)
HBA1C MFR BLD: 6.3 %
HCT VFR BLD AUTO: 37.9 % (ref 36–46)
HGB BLD-MCNC: 12 G/DL (ref 12–16)
INR PPP: 2.1 (ref 0.9–1.1)
MCH RBC QN AUTO: 29.1 PG (ref 26–34)
MCHC RBC AUTO-ENTMCNC: 31.7 G/DL (ref 32–36)
MCV RBC AUTO: 92 FL (ref 80–100)
NRBC BLD-RTO: 0 /100 WBCS (ref 0–0)
PLATELET # BLD AUTO: 218 X10*3/UL (ref 150–450)
POTASSIUM SERPL-SCNC: 5.2 MMOL/L (ref 3.5–5.3)
PROT SERPL-MCNC: 6.8 G/DL (ref 6.4–8.2)
PROTHROMBIN TIME: 24.4 SECONDS (ref 9.8–12.8)
RBC # BLD AUTO: 4.12 X10*6/UL (ref 4–5.2)
SODIUM SERPL-SCNC: 136 MMOL/L (ref 136–145)
TSH SERPL-ACNC: 0.55 MIU/L (ref 0.44–3.98)
WBC # BLD AUTO: 7.1 X10*3/UL (ref 4.4–11.3)

## 2024-08-08 PROCEDURE — 85610 PROTHROMBIN TIME: CPT

## 2024-08-08 PROCEDURE — 82306 VITAMIN D 25 HYDROXY: CPT

## 2024-08-08 PROCEDURE — 85027 COMPLETE CBC AUTOMATED: CPT

## 2024-08-08 PROCEDURE — 83036 HEMOGLOBIN GLYCOSYLATED A1C: CPT

## 2024-08-08 PROCEDURE — 80053 COMPREHEN METABOLIC PANEL: CPT

## 2024-08-08 PROCEDURE — 84443 ASSAY THYROID STIM HORMONE: CPT

## 2024-08-11 ASSESSMENT — ENCOUNTER SYMPTOMS: HYPERTENSION: 1

## 2024-08-12 ENCOUNTER — APPOINTMENT (OUTPATIENT)
Dept: PRIMARY CARE | Facility: CLINIC | Age: 84
End: 2024-08-12
Payer: MEDICARE

## 2024-08-12 VITALS
DIASTOLIC BLOOD PRESSURE: 70 MMHG | SYSTOLIC BLOOD PRESSURE: 110 MMHG | OXYGEN SATURATION: 98 % | WEIGHT: 240 LBS | RESPIRATION RATE: 14 BRPM | BODY MASS INDEX: 36.37 KG/M2 | HEART RATE: 104 BPM | HEIGHT: 68 IN

## 2024-08-12 DIAGNOSIS — E11.22 TYPE 2 DIABETES MELLITUS WITH CHRONIC KIDNEY DISEASE, WITHOUT LONG-TERM CURRENT USE OF INSULIN, UNSPECIFIED CKD STAGE (MULTI): ICD-10-CM

## 2024-08-12 DIAGNOSIS — E55.9 MILD VITAMIN D DEFICIENCY: ICD-10-CM

## 2024-08-12 DIAGNOSIS — E78.5 HYPERLIPIDEMIA, UNSPECIFIED HYPERLIPIDEMIA TYPE: ICD-10-CM

## 2024-08-12 DIAGNOSIS — E03.9 HYPOTHYROIDISM, UNSPECIFIED TYPE: ICD-10-CM

## 2024-08-12 DIAGNOSIS — R91.8 LUNG NODULES: Primary | ICD-10-CM

## 2024-08-12 PROCEDURE — 3078F DIAST BP <80 MM HG: CPT | Performed by: INTERNAL MEDICINE

## 2024-08-12 PROCEDURE — 3074F SYST BP LT 130 MM HG: CPT | Performed by: INTERNAL MEDICINE

## 2024-08-12 PROCEDURE — 99214 OFFICE O/P EST MOD 30 MIN: CPT | Performed by: INTERNAL MEDICINE

## 2024-08-12 PROCEDURE — 1157F ADVNC CARE PLAN IN RCRD: CPT | Performed by: INTERNAL MEDICINE

## 2024-08-12 PROCEDURE — 1123F ACP DISCUSS/DSCN MKR DOCD: CPT | Performed by: INTERNAL MEDICINE

## 2024-08-12 PROCEDURE — 1036F TOBACCO NON-USER: CPT | Performed by: INTERNAL MEDICINE

## 2024-08-12 PROCEDURE — 1159F MED LIST DOCD IN RCRD: CPT | Performed by: INTERNAL MEDICINE

## 2024-08-12 ASSESSMENT — ENCOUNTER SYMPTOMS
FATIGUE: 1
CHILLS: 0
DIAPHORESIS: 0
JOINT SWELLING: 0
MYALGIAS: 0
VOMITING: 0
CONSTIPATION: 0
FEVER: 0
HYPERTENSION: 1
DIARRHEA: 0
NAUSEA: 0
SHORTNESS OF BREATH: 1
COUGH: 0

## 2024-08-12 NOTE — PATIENT INSTRUCTIONS
FOLLOW UP 6 MONTHS WITH LABS BEFORE    2.  ECHOCARDIOGRAM FROM [facility]IL FORTUNATELY SHOWS NORMAL LEFT VENTRICLE PUMP FUNCTION    3.  CARDIOLOGY DOCTORS WILL ADJUST YOUR MEDICATIONS, HOWEVER IT HAS BEEN SUGGESTED THAT A PARTICULAR TYPE OF DIABETES MEDICATIONS - CALLED SGLT-2 INHIBITORS - LIKE JARDIANCE AND FARXIGA - CAN HELP WITH THE TYPE OF CONGESTIVE HEART FAILURE THAT YOU HAVE = HEART FAILURE WITH PRESERVED EJECTION FRACTION    4.  PLEASE CALL IF REFILLS ARE NEEDED      5.  WE REVIEWED IN DETAIL THE CINEGRAMS FROM YOUR CORONARY ANGIOGRAM, AND WE ACTUALLY COULD SEE THE 50% LESION OF THE LAD SHORTLY AFTER THE TAKEOFF OF A LARGE DIAGONAL BRANCH, WE ALSO WATCHED THE FLOW TRANSDUCER CROSSING THE LESION TO ESTABLISH ADEQUATE FLOW RATES IN THE ARTERY EVEN AFTER INTRALUMINAL VASOPRESSOR ADMINISTRATION IN THE LAD

## 2024-08-12 NOTE — PROGRESS NOTES
"Subjective   Promise Perry is a 84 y.o. female who presents for  FOLLOW UP     Hypertension  Associated symptoms include shortness of breath. Pertinent negatives include no chest pain. Agents associated with hypertension include NSAIDs. Risk factors for coronary artery disease include diabetes mellitus, family history and sedentary lifestyle. There are no compliance problems.       HAD HEART CATH DUE TO SOB AND FATIGUE, OCCASIONAL PRESSURE.  HAD SOME NARROWING, BUT NO STENT.        HAD LABS, DOING OK    ARTHRITIS IN HANDS.    HAS HAD FOLLOW UP DR. MALGORZATA SUAREZ FOR TESTING, DON'T SEE ANYBODY ANYMORE    TORSEMIDE INCREASED TO 20MG DAILY AND ADDED SPIRONOLACTONE    SEE CARDIOLOGY HEART FAILURE SPECIALIST.  Review of Systems   Constitutional:  Positive for fatigue. Negative for chills, diaphoresis and fever.   Respiratory:  Positive for shortness of breath. Negative for cough.    Cardiovascular:  Negative for chest pain and leg swelling.   Gastrointestinal:  Negative for constipation, diarrhea, nausea and vomiting.   Musculoskeletal:  Negative for joint swelling and myalgias.       Objective   /70   Pulse 104   Resp 14   Ht 1.727 m (5' 8\")   Wt 109 kg (240 lb)   SpO2 98%   BMI 36.49 kg/m²     Physical Exam  Vitals reviewed.   Constitutional:       General: She is not in acute distress.     Appearance: She is obese. She is not ill-appearing.   Cardiovascular:      Rate and Rhythm: Normal rate and regular rhythm.      Pulses: Normal pulses.      Heart sounds:      No gallop.   Pulmonary:      Breath sounds: Normal breath sounds. No wheezing, rhonchi or rales.   Abdominal:      General: Abdomen is flat. Bowel sounds are normal.      Palpations: Abdomen is soft.      Tenderness: There is no guarding or rebound.   Musculoskeletal:      Right lower leg: No edema.      Left lower leg: No edema.   Skin:     Findings: Rash present.         Assessment/Plan   Problem List Items Addressed This Visit       " Hypothyroidism    Relevant Orders    TSH with reflex to Free T4 if abnormal    Type 2 diabetes mellitus with chronic kidney disease, without long-term current use of insulin, unspecified CKD stage (Multi)    Relevant Orders    Hemoglobin A1C     Other Visit Diagnoses       Lung nodules    -  Primary    Relevant Orders    CT chest wo IV contrast    Hyperlipidemia, unspecified hyperlipidemia type        Relevant Orders    Vitamin B12    Lipid Panel    Comprehensive Metabolic Panel    CBC    Mild vitamin D deficiency        Relevant Orders    Vitamin D 25-Hydroxy,Total (for eval of Vitamin D levels)          Patient Instructions    FOLLOW UP 6 MONTHS WITH LABS BEFORE    2.  ECHOCARDIOGRAM FROM [facility] FORTUNATELY SHOWS NORMAL LEFT VENTRICLE PUMP FUNCTION    3.  CARDIOLOGY DOCTORS WILL ADJUST YOUR MEDICATIONS, HOWEVER IT HAS BEEN SUGGESTED THAT A PARTICULAR TYPE OF DIABETES MEDICATIONS - CALLED SGLT-2 INHIBITORS - LIKE JARDIANCE AND FARXIGA - CAN HELP WITH THE TYPE OF CONGESTIVE HEART FAILURE THAT YOU HAVE    4.  PLEASE CALL IF REFILLS ARE NEEDED      5.  WE REVIEWED IN DETAIL THE CINEGRAMS FROM YOUR CORONARY ANGIOGRAM, AND WE ACTUALLY COULD SEE THE 50% LESION OF THE LAD SHORTLY AFTER THE TAKEOFF OF A LARGE DIAGONAL BRANCH, WE ALSO WATCHED THE FLOW TRANSDUCER CROSSING THE LESION TO ESTABLISH ADEQUATE FLOW RATES IN THE ARTERY EVEN AFTER INTRALUMINAL VASOPRESSOR ADMINISTRATION IN THE LAD

## 2024-08-13 DIAGNOSIS — E03.9 HYPOTHYROIDISM, UNSPECIFIED TYPE: ICD-10-CM

## 2024-08-13 DIAGNOSIS — I48.91 ATRIAL FIBRILLATION, UNSPECIFIED TYPE (MULTI): Primary | ICD-10-CM

## 2024-08-13 DIAGNOSIS — E78.5 HYPERLIPIDEMIA, UNSPECIFIED HYPERLIPIDEMIA TYPE: ICD-10-CM

## 2024-08-13 RX ORDER — SIMVASTATIN 40 MG/1
40 TABLET, FILM COATED ORAL DAILY
Qty: 90 TABLET | Refills: 3 | Status: SHIPPED | OUTPATIENT
Start: 2024-08-13

## 2024-08-13 RX ORDER — WARFARIN SODIUM 5 MG/1
TABLET ORAL
Qty: 84 TABLET | Refills: 3 | Status: SHIPPED | OUTPATIENT
Start: 2024-08-13

## 2024-08-13 RX ORDER — LEVOTHYROXINE SODIUM 150 UG/1
TABLET ORAL
Qty: 84 TABLET | Refills: 3 | Status: SHIPPED | OUTPATIENT
Start: 2024-08-13

## 2024-08-13 RX ORDER — METOPROLOL SUCCINATE 50 MG/1
75 TABLET, EXTENDED RELEASE ORAL DAILY
Qty: 135 TABLET | Refills: 3 | Status: SHIPPED | OUTPATIENT
Start: 2024-08-13

## 2024-10-02 DIAGNOSIS — I48.91 ATRIAL FIBRILLATION, UNSPECIFIED TYPE (MULTI): ICD-10-CM

## 2024-10-03 RX ORDER — WARFARIN SODIUM 5 MG/1
TABLET ORAL
Qty: 84 TABLET | Refills: 2 | Status: SHIPPED | OUTPATIENT
Start: 2024-10-03

## 2024-10-09 ENCOUNTER — OFFICE VISIT (OUTPATIENT)
Dept: CARDIOLOGY | Facility: CLINIC | Age: 84
End: 2024-10-09
Payer: MEDICARE

## 2024-10-09 VITALS
DIASTOLIC BLOOD PRESSURE: 85 MMHG | HEIGHT: 68 IN | BODY MASS INDEX: 36.74 KG/M2 | HEART RATE: 89 BPM | RESPIRATION RATE: 20 BRPM | WEIGHT: 242.4 LBS | SYSTOLIC BLOOD PRESSURE: 136 MMHG

## 2024-10-09 DIAGNOSIS — I87.393 CHRONIC VENOUS HYPERTENSION WITH COMPLICATION INVOLVING BOTH SIDES: Primary | ICD-10-CM

## 2024-10-09 PROCEDURE — 99213 OFFICE O/P EST LOW 20 MIN: CPT | Performed by: INTERNAL MEDICINE

## 2024-10-09 ASSESSMENT — PAIN SCALES - GENERAL: PAINLEVEL: 4

## 2024-10-09 ASSESSMENT — PATIENT HEALTH QUESTIONNAIRE - PHQ9
1. LITTLE INTEREST OR PLEASURE IN DOING THINGS: NOT AT ALL
SUM OF ALL RESPONSES TO PHQ9 QUESTIONS 1 AND 2: 0
SUM OF ALL RESPONSES TO PHQ9 QUESTIONS 1 AND 2: 0
2. FEELING DOWN, DEPRESSED OR HOPELESS: NOT AT ALL
2. FEELING DOWN, DEPRESSED OR HOPELESS: NOT AT ALL
1. LITTLE INTEREST OR PLEASURE IN DOING THINGS: NOT AT ALL

## 2024-10-09 ASSESSMENT — COLUMBIA-SUICIDE SEVERITY RATING SCALE - C-SSRS
1. IN THE PAST MONTH, HAVE YOU WISHED YOU WERE DEAD OR WISHED YOU COULD GO TO SLEEP AND NOT WAKE UP?: NO
2. HAVE YOU ACTUALLY HAD ANY THOUGHTS OF KILLING YOURSELF?: NO
6. HAVE YOU EVER DONE ANYTHING, STARTED TO DO ANYTHING, OR PREPARED TO DO ANYTHING TO END YOUR LIFE?: NO

## 2024-10-09 NOTE — PATIENT INSTRUCTIONS
ASSESSMENT/PLAN:    here for follow up CVHTN - doing well. No interval ulcers. Using the 15-20 mmHg stocking. Elevating more in the evening. Increase walking as able. Discussed pool walking. Follow up 6 months.

## 2024-10-09 NOTE — PROGRESS NOTES
"OUTPATIENT FOLLOW-UP -  VASCULAR MEDICINE    DOS: 10/9/2024  Last seen: 4/10/2024       REQUESTING PHYSICIAN:  Dr. Thomas Patino, PCP    REASON FOR FOLLOW-UP:  here for follow up CVHTN    HISTORY OF PRESENT ILLNESS:     82 yo lady here for follow up CVHTN. 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 Dog is doing well. Using the compression - 15-20 mmHg. Still using the scooter but walking.     REVIEW OF SYSTEMS:     weight is decreasing - down 12#  No sores, ulcers, rashes, + skin lesions  + edema, no calf pain    PHYSICAL EXAMINATION:   /85 (BP Location: Left arm, Patient Position: Sitting)   Pulse 89   Resp 20   Ht 1.727 m (5' 8\")   Wt 110 kg (242 lb 6.4 oz)   BMI 36.86 kg/m²     Gen: Appears well, NAD  Ext: 1+ edema, nontender  +VV - VT and reticular veins  Skin: LDS and hemosiderin staining over the gaiter area  Mood and affect appropriate    ADDITIONAL DATA:   Wearing 15-20mmHg  compression today. Right calf: 46.0cm  Left calf:  45.0cm    ASSESSMENT/PLAN:    here for follow up CVHTN - doing well. No interval ulcers. Using the 15-20 mmHg stocking. Elevating more in the evening. Increase walking as able. Discussed pool walking. Follow up 6 months.    "

## 2024-11-05 ENCOUNTER — OFFICE VISIT (OUTPATIENT)
Dept: CARDIOLOGY | Facility: HOSPITAL | Age: 84
End: 2024-11-05
Payer: MEDICARE

## 2024-11-05 VITALS
WEIGHT: 245.8 LBS | BODY MASS INDEX: 37.25 KG/M2 | DIASTOLIC BLOOD PRESSURE: 78 MMHG | SYSTOLIC BLOOD PRESSURE: 125 MMHG | OXYGEN SATURATION: 93 % | HEIGHT: 68 IN | HEART RATE: 87 BPM

## 2024-11-05 DIAGNOSIS — I50.32 CHRONIC HEART FAILURE WITH PRESERVED EJECTION FRACTION: Primary | ICD-10-CM

## 2024-11-05 DIAGNOSIS — M25.541 JOINT PAIN IN FINGERS OF BOTH HANDS: ICD-10-CM

## 2024-11-05 DIAGNOSIS — M25.542 JOINT PAIN IN FINGERS OF BOTH HANDS: ICD-10-CM

## 2024-11-05 PROCEDURE — 99214 OFFICE O/P EST MOD 30 MIN: CPT | Performed by: INTERNAL MEDICINE

## 2024-11-05 ASSESSMENT — PAIN SCALES - GENERAL: PAINLEVEL_OUTOF10: 0-NO PAIN

## 2024-11-05 NOTE — PATIENT INSTRUCTIONS
Thank you for coming to see us today! To reach Dr. Nielsen's office please call [phone].  Fax [phone]. Call [phone] to schedule an appointment. You may also contact the HF RNs at [email or web address]  (Please include your name and date of birth)  For MEDICATION REFILLS, please call [phone] option [phone] then option 1.    Get blood work (RFP, ESR, CRP, Ra factor, BOO, CBC)  When you get home, call us and give us a list of your medications ([phone])  We will refer you to Rheumatology. Call [phone] to schedule an appointment.   Schedule a follow up 6-9 months.

## 2024-11-05 NOTE — PROGRESS NOTES
"Chief Complaint:   FUV (Review cath results)     History Of Present Illness:    Promise Perry is a 84 y.o. female here for follow up  she has a PMH significant for HFpEF, COPD , HTN , ANGI on CPAP , chronic Afib ( followed by Dr De La Vega )  along with venous insufficiency ( followed by Dr Rios ) .    Interval Hx: Denies chest pain, chest pressure,  orthopnea, PND. No edema noted in BLE.   Complaints of finger joint swelling and tenderness   Denies headaches, dizziness, lightheadedness, and falls.  Reports a rash that she has had on her breasts and abdomen for a few months , for which she was seen by dermatology but it has not resolved  Underwent a right and left heart catheterization in August 2024 which did not show any significant CAD and showed normal filling pressures and normal CI     July 2024 ( LHC and RHC )    Diffuse mild calcific disease.   2. Mid LAD lesion that angiographically appears to be 50-60% stenotic, FFR negative.   3. RHC with normal filling pressures with normal CO and CI.    Echocardiogram ( 04/2024)    1. Left ventricular systolic function is normal with a 65% estimated ejection fraction.   2. There is low normal right ventricular systolic function.   3. The left atrium is severely dilated.   4. The right atrium is moderately to severely dilated.   5. Slightly elevated RVSP.   6. Compared with study from 11/8/2022, no significant change.   7. The patient is in atrial fibrillation which may influence the estimate of left ventricular function and transvalvular flows.     Last Recorded Vitals:  Vitals:    11/05/24 0829   BP: 125/78   BP Location: Left arm   Patient Position: Sitting   Pulse: 87   SpO2: 93%   Weight: 111 kg (245 lb 12.8 oz)   Height: 1.727 m (5' 8\")       Past Medical History:  She has a past medical history of Arthritis, Asthma, CHF (congestive heart failure), Chronic kidney disease, unspecified (07/21/2013), Chronic obstructive pulmonary disease, unspecified (04/16/2018), " Chronic obstructive pulmonary disease, unspecified (06/18/2018), Chronic venous hypertension (idiopathic) with other complications of unspecified lower extremity (04/16/2018), Chronic venous hypertension (idiopathic) with other complications of unspecified lower extremity (06/18/2018), Diabetes mellitus (Multi), Diverticulosis of intestine, part unspecified, without perforation or abscess without bleeding (07/21/2013), Edema, unspecified (04/16/2018), Edema, unspecified (06/18/2018), Essential (primary) hypertension (12/17/2022), Essential (primary) hypertension (07/21/2013), Gastro-esophageal reflux disease without esophagitis (07/21/2013), Gastrointestinal hemorrhage, unspecified (07/21/2013), Hyperlipidemia, unspecified (07/21/2013), Hypothyroidism, unspecified (04/16/2018), Hypothyroidism, unspecified (06/18/2018), Malignant neoplasm of thyroid gland (Multi) (12/16/2022), Obesity, Obstructive sleep apnea (adult) (pediatric) (04/16/2018), Obstructive sleep apnea (adult) (pediatric) (06/18/2018), Other disorders of electrolyte and fluid balance, not elsewhere classified (04/16/2018), Other disorders of electrolyte and fluid balance, not elsewhere classified (06/18/2018), Other fatigue (04/16/2018), Other fatigue (06/18/2018), Other forms of dyspnea (04/16/2018), Other forms of dyspnea (06/18/2018), Pain in unspecified knee (06/18/2018), Personal history of other specified conditions (04/10/2017), Pneumonia, Unspecified abdominal hernia without obstruction or gangrene, Unspecified atrial fibrillation (Multi) (12/16/2022), Unspecified diastolic (congestive) heart failure (10/18/2022), Unspecified osteoarthritis, unspecified site (04/16/2018), and Unspecified osteoarthritis, unspecified site (06/18/2018).    Past Surgical History:  She has a past surgical history that includes Incisional breast biopsy (09/25/2013); Total thyroidectomy (09/25/2013); Esophagogastroduodenoscopy (08/28/2014); Colonoscopy (08/28/2014);  Cataract extraction (03/12/2018); CT angio coronary art with heartflow if score >30% (08/15/2017); Joint replacement; Cardiac catheterization (N/A, 7/26/2024); and Cardiac catheterization (N/A, 7/26/2024).      Social History:  She reports that she quit smoking about 59 years ago. Her smoking use included cigarettes. She started smoking about 67 years ago. She has a 5 pack-year smoking history. She has never used smokeless tobacco. She reports current alcohol use of about 2.0 standard drinks of alcohol per week. She reports that she does not currently use drugs.    Family History:  Family History   Problem Relation Name Age of Onset    Hypertension Mother Esther Perry     Heart disease Mother Esther Perry     Cancer Father Pankaj Perry         Allergies:  Ceclor [cefaclor], Codeine, Lisinopril, Losartan, Quinidine, Allopurinol, Colchicine, and Erythromycin    Outpatient Medications:  Current Outpatient Medications   Medication Instructions    acetaminophen (TYLENOL) 1,000 mg, Every 6 hours PRN    AmaryL 1 mg, oral, Daily    blood sugar diagnostic (OneTouch Verio test strips) strip 1 strip, subcutaneous, Daily RT    Bydureon BCise 2 mg/0.85 mL auto-injector subcutaneous, Once Weekly, Inject contents of (1) pen subcutaneously once a week as directed.    celecoxib (CELEBREX) 200 mg, oral, Daily    cholecalciferol (VITAMIN D-3) 50 mcg, Daily    citalopram (CELEXA) 20 mg, oral, Daily    cyanocobalamin (VITAMIN B-12) 500 mcg, Daily    fluticasone (Flonase) 50 mcg/actuation nasal spray 2 sprays, Each Nostril, Daily, Shake gently. Before first use, prime pump. After use, clean tip and replace cap.    hydrALAZINE (APRESOLINE) 50 mg, oral, 2 times daily    levothyroxine (Synthroid, Levoxyl) 150 mcg tablet TAKE 1 TABLET BY MOUTH DAILY FOR 6 DAYS THEN ONE-HALF TABLET BY  MOUTH ON 7TH DAY    metFORMIN XR (GLUCOPHAGE-XR) 1,500 mg, oral, Daily    metoprolol succinate XL (TOPROL-XL) 75 mg, oral, Daily, Take 1 and 1/2 (one and  one-half) tablets by mouth daily.    simvastatin (ZOCOR) 40 mg, oral, Daily    spironolactone (ALDACTONE) 12.5 mg, oral, Daily    torsemide (DEMADEX) 20 mg, oral, Daily    triamcinolone (Kenalog) 0.1 % cream Topical, 2 times daily, Apply to affected area 1-2 times daily as needed. Avoid face and groin.    verapamil SR (CALAN-SR) 240 mg, oral, Daily    warfarin (Coumadin) 5 mg tablet TAKE 1 TABLET BY MOUTH MONDAY  THROUGH FRIDAY AND ONE-HALF  TABLET BY MOUTH ON SATURDAYS       Physical Exam:  GEN: NAD , AOX3  HEENT : JVP at the clavicle   Heart : irregular , no murmurs   Lungs : clear , resonant , normal air entry bilaterally   Abdomen : soft , non tender   Ext: + varicose veins and venous insufficiency skin changes along with swelling of the joints in the fingers bilaterally   Neuro : grossly intact    Skin: has non blanching pink lesions on her abdomen and her breasts      Last Labs:  CBC -  Lab Results   Component Value Date    WBC 7.1 08/08/2024    HGB 12.0 08/08/2024    HCT 37.9 08/08/2024    MCV 92 08/08/2024     08/08/2024       CMP -  Lab Results   Component Value Date    CALCIUM 10.1 08/08/2024    PHOS 3.9 07/10/2024    PROT 6.8 08/08/2024    ALBUMIN 4.1 08/08/2024    AST 11 08/08/2024    ALT 7 08/08/2024    ALKPHOS 54 08/08/2024    BILITOT 0.8 08/08/2024       LIPID PANEL -   Lab Results   Component Value Date    CHOL 134 05/30/2023    TRIG 116 05/30/2023    HDL 46.1 05/30/2023    CHHDL 2.9 05/30/2023    LDLF 65 05/30/2023    VLDL 23 05/30/2023       RENAL FUNCTION PANEL -   Lab Results   Component Value Date    GLUCOSE 110 (H) 08/08/2024     08/08/2024    K 5.2 08/08/2024     08/08/2024    CO2 28 08/08/2024    ANIONGAP 13 08/08/2024    BUN 30 (H) 08/08/2024    CREATININE 1.54 (H) 08/08/2024    CALCIUM 10.1 08/08/2024    PHOS 3.9 07/10/2024    ALBUMIN 4.1 08/08/2024        Lab Results   Component Value Date     (H) 07/01/2024    HGBA1C 6.3 (H) 08/08/2024    HGBA1C 6.3 05/28/2024            Assessment/Plan   Promise Perry is a 84 y.o. female here for follow up  she has a PMH significant for HFpEF, COPD , HTN , ANGI on CPAP , chronic Afib ( followed by Dr De La Vega )  along with venous insufficiency ( followed by Dr Rios ) .     #HFpEF   Most recent RHC showed normal filling pressures on current diuretic regimen , will maintain her on torsemide 20 mg daily   She was unsure what medications she is taking in terms of spironolactone and the frequency of her loop diuretics . Advised her to call us when she gets home and review the list   Will also repeat a renal function panel and BNP     #Atrial fibrillation   Followed by Dr De La Vega , on verapamil 240 mg daily and Toprol XL 75 mg daily   On coumadin for anticoagulation     #Joint discomfort   ? Rheumatoid arthritis   Referral to rheumatology   Obtain basic labs , ESR , CRP , BNP , BOO , CBC     #Venous insufficiency   Follows with Dr Rios     #CAD   Has Mid LAD 50-60% stenosis ,negative FFR   On simvastatin   LDL at goal     Follow up in 6 months

## 2024-11-06 DIAGNOSIS — I50.32 CHRONIC HEART FAILURE WITH PRESERVED EJECTION FRACTION: ICD-10-CM

## 2024-11-06 DIAGNOSIS — R06.09 DYSPNEA ON EXERTION: ICD-10-CM

## 2024-11-06 DIAGNOSIS — I48.91 ATRIAL FIBRILLATION, UNSPECIFIED TYPE (MULTI): ICD-10-CM

## 2024-11-07 ENCOUNTER — LAB (OUTPATIENT)
Dept: LAB | Facility: LAB | Age: 84
End: 2024-11-07
Payer: MEDICARE

## 2024-11-07 DIAGNOSIS — M25.541 JOINT PAIN IN FINGERS OF BOTH HANDS: ICD-10-CM

## 2024-11-07 DIAGNOSIS — I50.32 CHRONIC HEART FAILURE WITH PRESERVED EJECTION FRACTION: ICD-10-CM

## 2024-11-07 DIAGNOSIS — M25.542 JOINT PAIN IN FINGERS OF BOTH HANDS: ICD-10-CM

## 2024-11-07 LAB
ALBUMIN SERPL BCP-MCNC: 4.2 G/DL (ref 3.4–5)
ANION GAP SERPL CALC-SCNC: 12 MMOL/L (ref 10–20)
BUN SERPL-MCNC: 34 MG/DL (ref 6–23)
CALCIUM SERPL-MCNC: 9.7 MG/DL (ref 8.6–10.3)
CHLORIDE SERPL-SCNC: 104 MMOL/L (ref 98–107)
CO2 SERPL-SCNC: 26 MMOL/L (ref 21–32)
CREAT SERPL-MCNC: 1.68 MG/DL (ref 0.5–1.05)
CRP SERPL-MCNC: 0.21 MG/DL
EGFRCR SERPLBLD CKD-EPI 2021: 30 ML/MIN/1.73M*2
ERYTHROCYTE [DISTWIDTH] IN BLOOD BY AUTOMATED COUNT: 14.4 % (ref 11.5–14.5)
ERYTHROCYTE [SEDIMENTATION RATE] IN BLOOD BY WESTERGREN METHOD: 10 MM/H (ref 0–30)
GLUCOSE SERPL-MCNC: 185 MG/DL (ref 74–99)
HCT VFR BLD AUTO: 38.1 % (ref 36–46)
HGB BLD-MCNC: 12.1 G/DL (ref 12–16)
MCH RBC QN AUTO: 30 PG (ref 26–34)
MCHC RBC AUTO-ENTMCNC: 31.8 G/DL (ref 32–36)
MCV RBC AUTO: 94 FL (ref 80–100)
NRBC BLD-RTO: 0 /100 WBCS (ref 0–0)
PHOSPHATE SERPL-MCNC: 3.6 MG/DL (ref 2.5–4.9)
PLATELET # BLD AUTO: 210 X10*3/UL (ref 150–450)
POTASSIUM SERPL-SCNC: 5.2 MMOL/L (ref 3.5–5.3)
RBC # BLD AUTO: 4.04 X10*6/UL (ref 4–5.2)
RHEUMATOID FACT SER NEPH-ACNC: <10 IU/ML (ref 0–15)
SODIUM SERPL-SCNC: 137 MMOL/L (ref 136–145)
WBC # BLD AUTO: 8.6 X10*3/UL (ref 4.4–11.3)

## 2024-11-07 RX ORDER — TORSEMIDE 20 MG/1
20 TABLET ORAL EVERY OTHER DAY
Qty: 45 TABLET | Refills: 3 | Status: SHIPPED | OUTPATIENT
Start: 2024-11-07 | End: 2025-11-07

## 2024-11-11 ENCOUNTER — TELEPHONE (OUTPATIENT)
Dept: PRIMARY CARE | Facility: CLINIC | Age: 84
End: 2024-11-11
Payer: MEDICARE

## 2024-11-11 DIAGNOSIS — E11.22 TYPE 2 DIABETES MELLITUS WITH CHRONIC KIDNEY DISEASE, WITHOUT LONG-TERM CURRENT USE OF INSULIN, UNSPECIFIED CKD STAGE (MULTI): ICD-10-CM

## 2024-11-11 RX ORDER — METFORMIN HYDROCHLORIDE 500 MG/1
1500 TABLET, EXTENDED RELEASE ORAL DAILY
Qty: 270 TABLET | Refills: 3 | Status: SHIPPED | OUTPATIENT
Start: 2024-11-11 | End: 2024-11-12 | Stop reason: SDUPTHER

## 2024-11-11 NOTE — TELEPHONE ENCOUNTER
Refill 90 day supply w/Refills  to [facility]    -metFORMIN  mg 24 hr tablet Take 3 tablets (1,500 mg) by mouth once daily.     PT HAS APPOINTMENT TOMORROW

## 2024-11-12 ENCOUNTER — APPOINTMENT (OUTPATIENT)
Dept: PRIMARY CARE | Facility: CLINIC | Age: 84
End: 2024-11-12
Payer: MEDICARE

## 2024-11-12 VITALS
RESPIRATION RATE: 14 BRPM | HEART RATE: 99 BPM | DIASTOLIC BLOOD PRESSURE: 74 MMHG | SYSTOLIC BLOOD PRESSURE: 126 MMHG | HEIGHT: 68 IN | BODY MASS INDEX: 36.83 KG/M2 | OXYGEN SATURATION: 96 % | WEIGHT: 243 LBS

## 2024-11-12 DIAGNOSIS — I48.91 ATRIAL FIBRILLATION, UNSPECIFIED TYPE (MULTI): ICD-10-CM

## 2024-11-12 DIAGNOSIS — E11.22 TYPE 2 DIABETES MELLITUS WITH CHRONIC KIDNEY DISEASE, WITHOUT LONG-TERM CURRENT USE OF INSULIN, UNSPECIFIED CKD STAGE (MULTI): ICD-10-CM

## 2024-11-12 DIAGNOSIS — Z00.00 MEDICARE ANNUAL WELLNESS VISIT, SUBSEQUENT: Primary | ICD-10-CM

## 2024-11-12 DIAGNOSIS — I87.332 CHRONIC VENOUS HYPERTENSION (IDIOPATHIC) WITH ULCER AND INFLAMMATION OF LEFT LOWER EXTREMITY (MULTI): ICD-10-CM

## 2024-11-12 DIAGNOSIS — Z12.31 ENCOUNTER FOR SCREENING MAMMOGRAM FOR BREAST CANCER: ICD-10-CM

## 2024-11-12 DIAGNOSIS — E11.9 DIABETES MELLITUS TYPE 2 WITHOUT RETINOPATHY (MULTI): ICD-10-CM

## 2024-11-12 DIAGNOSIS — Z00.00 PREVENTATIVE HEALTH CARE: ICD-10-CM

## 2024-11-12 LAB — POC HEMOGLOBIN A1C: 6.6 % (ref 4.2–6.5)

## 2024-11-12 RX ORDER — METFORMIN HYDROCHLORIDE 500 MG/1
1500 TABLET, EXTENDED RELEASE ORAL DAILY
Start: 2024-11-12

## 2024-11-12 RX ORDER — EXENATIDE 2 MG/.85ML
2 INJECTION, SUSPENSION, EXTENDED RELEASE SUBCUTANEOUS
Qty: 10.2 ML | Refills: 3 | Status: SHIPPED | OUTPATIENT
Start: 2024-11-12

## 2024-11-12 ASSESSMENT — ENCOUNTER SYMPTOMS
DIARRHEA: 0
FEVER: 0
JOINT SWELLING: 0
CHILLS: 0
MYALGIAS: 0
DIAPHORESIS: 0
COUGH: 0
VOMITING: 0
NAUSEA: 0
CONSTIPATION: 0

## 2024-11-12 ASSESSMENT — ACTIVITIES OF DAILY LIVING (ADL)
TAKING_MEDICATION: INDEPENDENT
BATHING: INDEPENDENT
MANAGING_FINANCES: INDEPENDENT
DOING_HOUSEWORK: INDEPENDENT
GROCERY_SHOPPING: INDEPENDENT
DRESSING: INDEPENDENT

## 2024-11-12 NOTE — ASSESSMENT & PLAN NOTE
Orders:    POCT glycosylated hemoglobin (Hb A1C) manually resulted    metFORMIN  mg 24 hr tablet; Take 3 tablets (1,500 mg) by mouth once daily.

## 2024-11-12 NOTE — PATIENT INSTRUCTIONS
RECOMMEND YOU GET THE SHINGRIX IMMUNIZATION SERIES AT THE PHARMACY, I PRINTED OUT A SCRIPT YOU CAN TAKE TO THE PHARMACY.  ALSO YOU SEEM TO BE DUE FOR TETANUS/PERTUSSIS IMMUNIZATION AT THE PHARMACY AS WELL.  THE PNEUMONIA IMMUNIZATION YOU HAD IN 2020, SO YOU CAN WAIT ONE MORE YEAR FOR THAT ONE.    2.  LABS ARE REVIEWED, AND THEY APPEAR STABLE.    3.  THE HAND ARTHRITIS MIGHT ACTUALLY BE GOUT, SINCE THE RHEUMATOID ARTHRITIS LABS ARE NEGATIVE THUS FAR.  WILL WAIT AND SEE WHAT THE RHEUMATOLOGIST HAS TO SAY ABOUT IT.    4.  PLEASE CALL FOR REFILLS WHEN NEEDED.    5.  REGULAR EYE EXAMS ARE RECOMMENDED FOR YOU.    6.  RECOMMEND YOU GET YOUR COUMADIN LEVELS CHECKED MONTHLY TO BE SAFE.    8.  REGULAR DIET/EXERCISE/WEIGHT MANAGEMENT RECOMMENDED FOR YOU    9.  A1C 6.6% TODAY = EXCELLENT DIABETIC CONTROL, RECOMMEND CONTINUE CURRENT MEDS.  I SENT IN BYDUREON FOR YOU    10.  FOLLOW UP 4-6 MONTHS OR AS NEEDED.

## 2024-11-12 NOTE — PROGRESS NOTES
"Subjective   Reason for Visit: Promise Perry is an 84 y.o. female here for a Medicare Wellness visit.   HAD FLU AND COVID        Reviewed all medications by prescribing practitioner or clinical pharmacist (such as prescriptions, OTCs, herbal therapies and supplements) and documented in the medical record.    HPI  RAN OUT OF BYDUREON A FEW WEEKS AGO.    BOTH HANDS HAVE BEEN ACHING.  CARDIOLOGIST DR. LOFTON REFERRED HER TO RHEUMATOLOGY.      FINGERS ARE GETTING STIFF AND SORE, HARD TO OPEN PILL BOTTLES AND TAKE MEDS.    DON'T GET PT/INR DRAWN VERY OFTEN.      GETS EYES CHECKED REGULARLY      Patient Care Team:  Thomas Patino MD as PCP - General (Internal Medicine)  Thomas Patino MD as PCP - [facility] ACO Attributed Provider  Yadira Rios MD as Consulting Physician (Cardiology)     Review of Systems   Constitutional:  Negative for chills, diaphoresis and fever.   Respiratory:  Negative for cough.    Cardiovascular:  Negative for chest pain and leg swelling.   Gastrointestinal:  Negative for constipation, diarrhea, nausea and vomiting.   Musculoskeletal:  Negative for joint swelling and myalgias.       Objective   Vitals:  /74   Pulse 99   Resp 14   Ht 1.727 m (5' 8\")   Wt 110 kg (243 lb)   SpO2 96%   BMI 36.95 kg/m²       Physical Exam  Vitals reviewed.   Constitutional:       General: She is not in acute distress.     Appearance: She is obese. She is not ill-appearing.   Cardiovascular:      Rate and Rhythm: Normal rate and regular rhythm.      Pulses: Normal pulses.      Heart sounds:      No gallop.   Pulmonary:      Breath sounds: Normal breath sounds. No wheezing, rhonchi or rales.   Abdominal:      General: Abdomen is flat. Bowel sounds are normal.      Palpations: Abdomen is soft.      Tenderness: There is no guarding or rebound.   Musculoskeletal:      Right lower leg: No edema.      Left lower leg: No edema.      Comments: STIGMATA OF CHRONIC HAND ARTHROPATHY BUT ALSO APPEARANCE OF " TOPHI ARISING FROM AN ENLARGED AND DEFORMED LEFT 3RD PIP AS WELL AS LEFT 3RD MCP   Neurological:      General: No focal deficit present.      Mental Status: She is oriented to person, place, and time.   Psychiatric:         Mood and Affect: Mood normal.         Behavior: Behavior normal.         Assessment & Plan  Type 2 diabetes mellitus with chronic kidney disease, without long-term current use of insulin, unspecified CKD stage (Multi)    Orders:    POCT glycosylated hemoglobin (Hb A1C) manually resulted    metFORMIN  mg 24 hr tablet; Take 3 tablets (1,500 mg) by mouth once daily.    Preventative health care    Orders:    zoster vaccine-recombinant adjuvanted (Shingrix) 50 mcg/0.5 mL vaccine; Inject 0.5 mL into the muscle 1 time for 1 dose.    Encounter for screening mammogram for breast cancer    Orders:    BI mammo bilateral screening tomosynthesis; Future    Diabetes mellitus type 2 without retinopathy (Multi)    Orders:    exenatide microspheres (Bydureon BCise) 2 mg/0.85 mL auto-injector; Inject 2 mg under the skin 1 (one) time per week. Inject contents of (1) pen subcutaneously once a week as directed.    Atrial fibrillation, unspecified type (Multi)    Orders:    Protime-INR; Standing    Chronic venous hypertension (idiopathic) with ulcer and inflammation of left lower extremity (Multi)  STABLE AT PRESENT AND QUIESCENT         Medicare annual wellness visit, subsequent            Patient Instructions    RECOMMEND YOU GET THE SHINGRIX IMMUNIZATION SERIES AT THE PHARMACY, I PRINTED OUT A SCRIPT YOU CAN TAKE TO THE PHARMACY.  ALSO YOU SEEM TO BE DUE FOR TETANUS/PERTUSSIS IMMUNIZATION AT THE PHARMACY AS WELL.  THE PNEUMONIA IMMUNIZATION YOU HAD IN 2020, SO YOU CAN WAIT ONE MORE YEAR FOR THAT ONE.    2.  LABS ARE REVIEWED, AND THEY APPEAR STABLE.    3.  THE HAND ARTHRITIS MIGHT ACTUALLY BE GOUT, SINCE THE RHEUMATOID ARTHRITIS LABS ARE NEGATIVE THUS FAR.  WILL WAIT AND SEE WHAT THE RHEUMATOLOGIST HAS TO SAY  ABOUT IT.    4.  PLEASE CALL FOR REFILLS WHEN NEEDED.    5.  REGULAR EYE EXAMS ARE RECOMMENDED FOR YOU.    6.  RECOMMEND YOU GET YOUR COUMADIN LEVELS CHECKED MONTHLY TO BE SAFE.    8.  REGULAR DIET/EXERCISE/WEIGHT MANAGEMENT RECOMMENDED FOR YOU    9.  A1C 6.6% TODAY = EXCELLENT DIABETIC CONTROL, RECOMMEND CONTINUE CURRENT MEDS.  I SENT IN BYDUREON FOR YOU    10.  FOLLOW UP 4-6 MONTHS OR AS NEEDED.

## 2024-11-18 ENCOUNTER — LAB (OUTPATIENT)
Dept: LAB | Facility: LAB | Age: 84
End: 2024-11-18
Payer: MEDICARE

## 2024-11-18 DIAGNOSIS — I48.91 ATRIAL FIBRILLATION, UNSPECIFIED TYPE (MULTI): ICD-10-CM

## 2024-11-18 LAB
INR PPP: 2.1 (ref 0.9–1.1)
PROTHROMBIN TIME: 24.2 SECONDS (ref 9.8–12.8)

## 2024-11-23 DIAGNOSIS — F32.A ANXIETY AND DEPRESSION: ICD-10-CM

## 2024-11-23 DIAGNOSIS — F41.9 ANXIETY AND DEPRESSION: ICD-10-CM

## 2024-11-25 RX ORDER — CITALOPRAM 20 MG/1
20 TABLET, FILM COATED ORAL DAILY
Qty: 90 TABLET | Refills: 3 | Status: SHIPPED | OUTPATIENT
Start: 2024-11-25

## 2024-12-03 ENCOUNTER — LAB (OUTPATIENT)
Dept: LAB | Facility: LAB | Age: 84
End: 2024-12-03
Payer: MEDICARE

## 2024-12-03 DIAGNOSIS — M25.541 JOINT PAIN IN FINGERS OF BOTH HANDS: ICD-10-CM

## 2024-12-03 DIAGNOSIS — M25.542 JOINT PAIN IN FINGERS OF BOTH HANDS: ICD-10-CM

## 2024-12-03 DIAGNOSIS — I50.32 CHRONIC HEART FAILURE WITH PRESERVED EJECTION FRACTION: ICD-10-CM

## 2024-12-03 PROCEDURE — 86038 ANTINUCLEAR ANTIBODIES: CPT

## 2024-12-04 LAB — ANA SER QL HEP2 SUBST: NEGATIVE

## 2024-12-06 ENCOUNTER — LAB (OUTPATIENT)
Dept: LAB | Facility: LAB | Age: 84
End: 2024-12-06
Payer: MEDICARE

## 2024-12-06 ENCOUNTER — HOSPITAL ENCOUNTER (OUTPATIENT)
Dept: RADIOLOGY | Facility: CLINIC | Age: 84
Discharge: HOME | End: 2024-12-06
Payer: MEDICARE

## 2024-12-06 ENCOUNTER — APPOINTMENT (OUTPATIENT)
Dept: RHEUMATOLOGY | Facility: CLINIC | Age: 84
End: 2024-12-06
Payer: MEDICARE

## 2024-12-06 VITALS
DIASTOLIC BLOOD PRESSURE: 82 MMHG | WEIGHT: 242 LBS | HEIGHT: 68 IN | SYSTOLIC BLOOD PRESSURE: 139 MMHG | BODY MASS INDEX: 36.68 KG/M2 | HEART RATE: 84 BPM | TEMPERATURE: 97.2 F

## 2024-12-06 DIAGNOSIS — M1A.9XX1 TOPHACEOUS GOUT: Primary | ICD-10-CM

## 2024-12-06 DIAGNOSIS — I50.32 CHRONIC HEART FAILURE WITH PRESERVED EJECTION FRACTION: ICD-10-CM

## 2024-12-06 DIAGNOSIS — M25.542 JOINT PAIN IN FINGERS OF BOTH HANDS: ICD-10-CM

## 2024-12-06 DIAGNOSIS — M25.541 JOINT PAIN IN FINGERS OF BOTH HANDS: ICD-10-CM

## 2024-12-06 DIAGNOSIS — M1A.9XX1 TOPHACEOUS GOUT: ICD-10-CM

## 2024-12-06 PROCEDURE — 73130 X-RAY EXAM OF HAND: CPT | Mod: 50

## 2024-12-06 PROCEDURE — 84550 ASSAY OF BLOOD/URIC ACID: CPT

## 2024-12-06 RX ORDER — COLCHICINE 0.6 MG/1
0.6 TABLET ORAL DAILY
Qty: 60 TABLET | Refills: 0 | Status: SHIPPED | OUTPATIENT
Start: 2024-12-06 | End: 2025-02-04

## 2024-12-06 RX ORDER — PREDNISONE 10 MG/1
20 TABLET ORAL DAILY
Qty: 10 TABLET | Refills: 0 | Status: SHIPPED | OUTPATIENT
Start: 2024-12-06 | End: 2024-12-11

## 2024-12-06 RX ORDER — ATORVASTATIN CALCIUM 10 MG/1
10 TABLET, FILM COATED ORAL DAILY
Qty: 30 TABLET | Refills: 11 | Status: SHIPPED | OUTPATIENT
Start: 2024-12-06 | End: 2025-12-06

## 2024-12-06 RX ORDER — FEBUXOSTAT 40 MG/1
40 TABLET, FILM COATED ORAL DAILY
Qty: 30 TABLET | Refills: 1 | Status: SHIPPED | OUTPATIENT
Start: 2024-12-06 | End: 2025-02-04

## 2024-12-06 NOTE — PATIENT INSTRUCTIONS
I am starting you on a new medicine called Febuxostat  And colchicine once daily    And changing the simvastatin to atorvastatin 10 mg daily    Stop takin celebrex altogether it is not good for your kidneys and heart  Predinsone for 5 days     I am going to make your cardiologist aware of this

## 2024-12-06 NOTE — PROGRESS NOTES
Subjective   Patient ID: [id number]   Promise Perry is a 84 y.o. female who presents for Arthritis and Hand Pain.  HPI    HTN, DM, HFpEF, Afib   On anticoagulation  On oral hypoglycemics  On Diuretics    Has been having episodic swelling of her MCPs and PIPs in the last year or so  Previously had an attack of acute painful episode in the foot  Was put on colchicine and allopurinol by the PCP   She developed hives from it and it was stopped subsequently.  For the last 4-6 months she has had difficulty making a fist with persistent swelling of the bilateral knuckles  Feels stiff throughout the day , and has pain in the knuckles  Difficulty grasping or holding objects  Has HFpEF , recent angio without CAD  On diuretics  Enjoys sea food occasionally  Does not consume sugary stuff anymore  Has early stage CKD  For her pains she was taking celebrex daily    Constitutional: Denies fever, chills, weight loss, night sweats or headaches  Eyes: Denies dry eyes, blurry vision, redness or pain or photophobia  ENT: Denies dry mouth, dental loss, loss of taste, nasal or oral ulcers, jaw claudication, difficulty swallowing, nasal crusting or recurrent sinus infections   Cardiovascular: Denies chest pain, palpitations, orthopnea  Respiratory: Denies shortness of breath, cough, asthma, or recurrent respiratory infections  Gastrointestinal: Denies dysphagia, nausea, vomiting, heartburn, abdominal pain, constipation, diarrhea, melena or hematochezia  Genitourinary: No recurrent urinary infections or STDs, no genital or anal ulcers.  Neurological: Denies any numbness or tingling, muscle weakness, or incontinence   Hematologic/Lymphatic: Denies bleeding, bruising, history of clots (arterial or venous), or abortions/miscarriages/pregnancy complications   MSK: No joint pains, redness, hotness or swelling. No inflammatory back pain, enthesitis, dactylitis. No morning stiffness   Muscular: Denies weakness, difficulty rising from chair or  combing the hair, muscle aches, or problems with hand    FHx: No family history of autoimmune diseases       Patient Active Problem List   Diagnosis    (HFpEF) heart failure with preserved ejection fraction    Anisometropia    Anterior uveitis    Arthritis    Atrial fibrillation (Multi)    Cataract of right eye    Chronic diarrhea of unknown origin    Chronic obstructive pulmonary disease (Multi)    Chronic venous hypertension with complication    Corneal edema of right eye    Controlled type 2 diabetes mellitus without complication, without long-term current use of insulin (Multi)    Floppy eyelid syndrome of both eyes    Hypertension    Hypothyroidism    Persistent mydriasis    Prolapsed internal hemorrhoids    Pseudophakia of both eyes    Severe obesity (BMI >= 40) (Multi)    Sleep apnea, obstructive    Medicare annual wellness visit, subsequent    History of total knee replacement, left    Anemia due to acute blood loss    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    Peripheral vascular disease of foot (CMS-HCC)    Type 2 diabetes mellitus with chronic kidney disease, without long-term current use of insulin, unspecified CKD stage (Multi)    Chronic venous hypertension (idiopathic) with ulcer and inflammation of left lower extremity (Multi)        Past Medical History:   Diagnosis Date    Arthritis     Asthma     CHF (congestive heart failure)     Chronic kidney disease, unspecified 07/21/2013    Chronic kidney disease    Chronic obstructive pulmonary disease, unspecified 04/16/2018    Chronic obstructive pulmonary disease    Chronic obstructive pulmonary disease, unspecified 06/18/2018    Chronic obstructive pulmonary disease    Chronic venous hypertension (idiopathic) with other complications of unspecified lower extremity 04/16/2018    Chronic venous hypertension with complication    Chronic venous hypertension (idiopathic) with other complications of unspecified lower extremity  06/18/2018    Chronic venous hypertension with complication    Diabetes mellitus (Multi)     Diverticulosis of intestine, part unspecified, without perforation or abscess without bleeding 07/21/2013    Diverticulosis    Edema, unspecified 04/16/2018    Edema    Edema, unspecified 06/18/2018    Edema    Essential (primary) hypertension 12/17/2022    Hypertension    Essential (primary) hypertension 07/21/2013    Hypertension    Gastro-esophageal reflux disease without esophagitis 07/21/2013    Esophageal reflux    Gastrointestinal hemorrhage, unspecified 07/21/2013    Gastrointestinal bleeding    Hyperlipidemia, unspecified 07/21/2013    Hyperlipidemia    Hypothyroidism, unspecified 04/16/2018    Hypothyroidism    Hypothyroidism, unspecified 06/18/2018    Hypothyroidism    Malignant neoplasm of thyroid gland (Multi) 12/16/2022    Thyroid cancer    Obesity     Obstructive sleep apnea (adult) (pediatric) 04/16/2018    Sleep apnea, obstructive    Obstructive sleep apnea (adult) (pediatric) 06/18/2018    Sleep apnea, obstructive    Other disorders of electrolyte and fluid balance, not elsewhere classified 04/16/2018    Electrolyte and fluid disorder    Other disorders of electrolyte and fluid balance, not elsewhere classified 06/18/2018    Electrolyte and fluid disorder    Other fatigue 04/16/2018    Fatigue    Other fatigue 06/18/2018    Fatigue    Other forms of dyspnea 04/16/2018    Dyspnea on effort    Other forms of dyspnea 06/18/2018    Dyspnea on effort    Pain in unspecified knee 06/18/2018    Joint pain, knee    Personal history of other specified conditions 04/10/2017    History of fatigue    Pneumonia     Unspecified abdominal hernia without obstruction or gangrene     Hernia    Unspecified atrial fibrillation (Multi) 12/16/2022    Atrial fibrillation    Unspecified diastolic (congestive) heart failure 10/18/2022    (HFpEF) heart failure with preserved ejection fraction    Unspecified osteoarthritis,  unspecified site 2018    Arthritis    Unspecified osteoarthritis, unspecified site 2018    Arthritis        Past Surgical History:   Procedure Laterality Date    CARDIAC CATHETERIZATION N/A 2024    Procedure: Left & Right Heart Cath w Angiography & LV;  Surgeon: Kwabena Iqbal MD;  Location: [place] [address] Cardiac Cath Lab;  Service: Cardiovascular;  Laterality: N/A;    CARDIAC CATHETERIZATION N/A 2024    Procedure: FFR (Fractional Flow [place]);  Surgeon: Kwabena Iqbal MD;  Location: [place] [address] Cardiac Cath Lab;  Service: Cardiovascular;  Laterality: N/A;    CATARACT EXTRACTION  2018    Cataract Extraction    COLONOSCOPY  2014    Complete Colonoscopy    CT ANGIO CORONARY ART WITH HEARTFLOW IF SCORE >30%  08/15/2017    CT HEART CORONARY ANGIOGRAM 8/15/2017 [facility]    ESOPHAGOGASTRODUODENOSCOPY  2014    Diagnostic Esophagogastroduodenoscopy    INCISIONAL BREAST BIOPSY  2013    Incisional Breast Biopsy    JOINT REPLACEMENT      TOTAL THYROIDECTOMY  2013    Thyroid Surgery Total Thyroidectomy        Social History     Socioeconomic History    Marital status: Single     Spouse name: Not on file    Number of children: Not on file    Years of education: Not on file    Highest education level: Not on file   Occupational History    Not on file   Tobacco Use    Smoking status: Former     Current packs/day: 0.00     Average packs/day: 0.5 packs/day for 10.0 years (5.0 ttl pk-yrs)     Types: Cigarettes     Start date: 1957     Quit date: 1965     Years since quittin.4    Smokeless tobacco: Never   Substance and Sexual Activity    Alcohol use: Yes     Alcohol/week: 2.0 standard drinks of alcohol     Types: 2 Glasses of wine per week     Comment: Occasionally on weekend    Drug use: Not Currently    Sexual activity: Not Currently   Other Topics Concern    Not on file   Social History Narrative    Not on file     Social Drivers of Health      Financial Resource Strain: Not on file   Food Insecurity: Not on file   Transportation Needs: Not on file   Physical Activity: Not on file   Stress: Not on file   Social Connections: Not on file   Intimate Partner Violence: Not on file   Housing Stability: Not on file        Allergies   Allergen Reactions    Ceclor [Cefaclor] Unknown    Codeine Nausea Only    Lisinopril Other     severve hypotension    Losartan Unknown    Quinidine Unknown    Allopurinol Rash    Colchicine Rash    Erythromycin Rash          Current Outpatient Medications:     acetaminophen (Tylenol) 325 mg tablet, Take 1,000 mg by mouth every 6 hours if needed., Disp: , Rfl:     AmaryL 1 mg tablet, Take 1 tablet (1 mg) by mouth once daily., Disp: 90 tablet, Rfl: 3    blood sugar diagnostic (OneTouch Verio test strips) strip, Inject 1 strip under the skin once daily., Disp: 100 strip, Rfl: 3    celecoxib (CeleBREX) 200 mg capsule, TAKE 1 CAPSULE BY MOUTH ONCE  DAILY, Disp: 90 capsule, Rfl: 3    cholecalciferol (Vitamin D-3) 50 MCG (2000 UT) tablet, Take 1 tablet (50 mcg) by mouth once daily., Disp: , Rfl:     citalopram (CeleXA) 20 mg tablet, TAKE 1 TABLET BY MOUTH ONCE  DAILY, Disp: 90 tablet, Rfl: 3    cyanocobalamin (Vitamin B-12) 500 mcg tablet, Take 1 tablet (500 mcg) by mouth once daily., Disp: , Rfl:     exenatide microspheres (Bydureon BCise) 2 mg/0.85 mL auto-injector, Inject 2 mg under the skin 1 (one) time per week. Inject contents of (1) pen subcutaneously once a week as directed., Disp: 10.2 mL, Rfl: 3    fluticasone (Flonase) 50 mcg/actuation nasal spray, Administer 2 sprays into each nostril once daily. Shake gently. Before first use, prime pump. After use, clean tip and replace cap., Disp: 16 g, Rfl: 2    hydrALAZINE (Apresoline) 50 mg tablet, TAKE 1 TABLET BY MOUTH TWICE  DAILY, Disp: 180 tablet, Rfl: 3    levothyroxine (Synthroid, Levoxyl) 150 mcg tablet, TAKE 1 TABLET BY MOUTH DAILY FOR 6 DAYS THEN ONE-HALF TABLET BY  MOUTH ON  7TH DAY, Disp: 84 tablet, Rfl: 3    metFORMIN  mg 24 hr tablet, Take 3 tablets (1,500 mg) by mouth once daily., Disp: , Rfl:     metoprolol succinate XL (Toprol-XL) 50 mg 24 hr tablet, TAKE 1 AND 1/2 TABLETS BY MOUTH  DAILY, Disp: 135 tablet, Rfl: 3    simvastatin (Zocor) 40 mg tablet, TAKE 1 TABLET BY MOUTH DAILY, Disp: 90 tablet, Rfl: 3    spironolactone (Aldactone) 25 mg tablet, Take 0.5 tablets (12.5 mg) by mouth once daily., Disp: 45 tablet, Rfl: 3    torsemide (Demadex) 20 mg tablet, Take 1 tablet (20 mg) by mouth every other day., Disp: 45 tablet, Rfl: 3    verapamil SR (Calan-SR) 240 mg ER tablet, Take 1 tablet (240 mg) by mouth once daily., Disp: 90 tablet, Rfl: 3    warfarin (Coumadin) 5 mg tablet, TAKE 1 TABLET BY MOUTH MONDAY  THROUGH FRIDAY AND ONE-HALF  TABLET BY MOUTH ON SATURDAYS, Disp: 84 tablet, Rfl: 2       Objective     Visit Vitals  /82   Pulse 84   Temp 36.2 °C (97.2 °F)        Physical Exam  Bilateral synovitis of 2nd and 3rd MCP  With tophaceous nodule and dactylitis appearance of the 3rd finger    Point of care ultrasound confirmed the two round tophi on the Right 3rd PIP  And double contour sign in the rt 2nd MCP, rt 3rd MCP   And left 2nd MCP     General: AAOx3, Cooperative  Head: normocephalic, atraumatic  Eyes: EOMI, conjunctiva clear, sclera white, anicteric  Neuro: CN II-XII grossly intact, no focal deficit  Skin: No rashes, ulcers or photosensitive areas  MSK: Upper Extremities:  Hand/Fingers: No erythema, swelling, tenderness or warmth at DIP, PIP, or MCP joints, FROM grossly. Good hand . No nodules. No deformities   Wrists: No erythema, swelling, warmth or tenderness at wrist, FROM grossly  Elbows: No tenderness, swelling, erythema or warmth at elbows, FROM grossly. No nodules   Shoulders: No swelling, erythema, tenderness or warmth at shoulders. FROM  Lower Extremities:   Hips: No obvious deformities. No joint tenderness, normal ROM grossly. Log roll test negative  "bilaterally. Melisa test is negative bilaterally. No trochanteric bursae TTP  Knees: No tenderness, deformities, swelling, rashes, or warmth, normal ROM grossly. No crepitus, no pes anserine bursa TTP   Ankles: No deformities, tenderness, edema, erythema, ulceration, or warmth at the ankle  Feet: Negative MTP squeeze. Normal ROM grossly.   Spine: No spinal tenderness to palpation. No SI joint tenderness. Gaenslen test negative       [unfilled]      Lab Results   Component Value Date    WBC 8.6 11/07/2024    HGB 12.1 11/07/2024    HCT 38.1 11/07/2024    MCV 94 11/07/2024     11/07/2024        Chemistry    Lab Results   Component Value Date/Time     11/07/2024 1348    K 5.2 11/07/2024 1348     11/07/2024 1348    CO2 26 11/07/2024 1348    BUN 34 (H) 11/07/2024 1348    CREATININE 1.68 (H) 11/07/2024 1348    Lab Results   Component Value Date/Time    CALCIUM 9.7 11/07/2024 1348    ALKPHOS 54 08/08/2024 1316    AST 11 08/08/2024 1316    ALT 7 08/08/2024 1316    BILITOT 0.8 08/08/2024 1316           Lab Results   Component Value Date    CRP 0.21 11/07/2024      Lab Results   Component Value Date    BOO Negative 12/03/2024    RF <10 11/07/2024    SEDRATE 10 11/07/2024      No results found for: \"CKTOTAL\"  Lab Results   Component Value Date    NEUTROABS 6.94 (H) 08/17/2023      No results found for: \"FERRITIN\"   No results found for: \"HEPATOT\", \"HEPAIGM\", \"HEPBCIGM\", \"HEPBCAB\", \"HBEAG\", \"HEPCAB\"   Lab Results   Component Value Date    ALT 7 08/08/2024    AST 11 08/08/2024    ALKPHOS 54 08/08/2024    BILITOT 0.8 08/08/2024      No results found for: \"PPD\"   Lab Results   Component Value Date    URICACID 6.6 01/24/2024      Lab Results   Component Value Date    CALCIUM 9.7 11/07/2024    PHOS 3.6 11/07/2024      No results found for: \"SPEP\", \"UPEP\"   No results found for: \"ALBUR\", \"XRO13YKS\"   .last          Cardiac Catheterization Procedure     [facility], Cath Lab, [address] [place] [address], " [place], [place] [address]    Cardiovascular Catheterization Report    Patient Name:     NAVIN CHUNG     Performing Physician:  [id number] Eden MD  Study Date:       7/26/2024           Verifying Physician:   Kaleigh Eden MD  MRN/PID:          [id number]            Cardiologist/Co-Scrub:  Accession#:       [id number]        Ordering Provider:     [id number] EDEN  Date of           1940 / 84      Cardiologist:  Birth/Age:        years  Gender:           F                   Fellow:                Stiven Martínez  Admit Date:                           Fellow:                Fernandez Jones  Encounter#:       [id number]          Surgeon:       Study:            Pressure Wire  Additional Study: Right and Left Heart Cath       Indications:  NAVIN CHUNG is a 84 year old female who presents with Shortness of breath  Chest pain. NAVIN CHUNG is a 84 year old female who presents with coronary artery disease and a chest pain assessment of atypical angina Shortness of breath  Chest pain. Stable known coronary artery disease.  Medical History:  Cardiac arrest: No.  Cardiac surgical consult: No.  Cardiovascular instability: No.  Frailty status of patient entering lab: 3 = Managing well.       Procedure Description:  After infiltration with local anesthetic, the right radial artery was cannulated with a modified Seldinger technique. Subsequently a 6 Slovenian sheath was placed in the right radial artery. After infiltration with 2% Lidocaine, a second arterial access was obtained via the right femoral artery with a modified Seldinger technique and a 6 Slovenian sheath was placed. After infiltration of local anesthetic, the right internal jugular vein was cannulated with a micropuncture technique. A 7 Slovenian sheath was placed in the  vein. Multiple injections of contrast were made into the left and right coronary arteries with angiograms recorded in multiple projections. Femoral artery angiography was performed at the additional arterial access site. This demonstrated a common femoral artery puncture appropriate for closure. A Perclose ProGlide 6F (Abbott) vascular closure device was placed per protocol.     Procedure Description Comments:  Mrs Perry is a 84 year old female who has been having progressively worsening exerional dyspnea and fatigue as well as atpical chest pain. She was optimized medically by her cardiologist. She had non-obstructive calcific LAD disease in 2017 on coronary CTA. Due to persisting symptoms, she presents for right heart catheterization and coronary angiography.    Right internal jugular vein access was obtained and a 7F sheath placed. The right radial access was obtained and a 6F slender sheath placed.    Right heart catheterization was performed with a 5F balloon wedge catheter. This showed normal filling pressurs and a cardiac output.    Coronary angiography was domne with a 4F JR4 catheter ( RCA) and 5F JL3.5 and 5F EBU 3.5 guide for LMCA).  These showed No obstructive disease in a dominant RCA without obstructive disease.  Selective left main coronary angagement was challenging due to severe brachiocephalic tortuosity. However these showed calcified Left main and proximal portion so fthe LAD and Left circumflex. The left main coronary artery and the left circumflex were freee of obstructive disease. There was a 50% stenosis in the mid LAD right after take off of a medium sized diagonal branch.    Due to difficulty with selective engagement from radial artery a right common femoral access was obtained with ultrasound and fluororscopic guidance and a 6F sheath placed. The left main coronary was engaged with a 6F EBU 3.5 guide. A pressure wire was zeroed and equalized in the guide and then passed across the  lesion. This showed RFR of 0.95.  IV adenosine was given seriql FFR measurements were performed. This showed FFR of 0.85. There was no drift on pullback. The RFR returned to normal in the proximal LAD.    Final angiograms showed EVONNE III flow in the LAD, no evidence of wire perforation, vessel dissection, ostial guide dissection, distal embolization.    Hemostasis of RCFA was with perclose Right radial with TR band and RIJV with manual pressure.    The procedure was tolerated without apparent complications.     Coronary Angiography:  The coronary circulation is co-dominant.     Left Main Coronary Artery:  The left main coronary artery is a normal caliber vessel. The left main coronary artery showed no significant disease or stenosis greater than 30%.     Left Anterior Descending Coronary Artery Distribution:  The left anterior descending coronary artery is a normal caliber vessel. The mid left anterior descending coronary artery showed 50% stenosis. The 1st diagonal branch is a normal caliber vessel. The 1st diagonal branch showed no significant disease or stenosis greater than 30%.     Circumflex Coronary Artery Distribution:  The circumflex coronary artery is a normal caliber vessel. The circumflex revealed no significant disease or stenosis greater than 30%. The 1st obtuse marginal branch is a normal caliber vessel. The 1st obtuse marginal branch showed no significant disease or stenosis greater than 30%.     Right Heart Catheterization:  Normal filling pressures. Cardiac output is normal.  RA mean: 6  RV 37/1 (6)  PA 38/14 (24)  PCWP 11    Cardiac Output/ Index by Kathy 6.4/2.9  Transpulmonary gradient 13mmHg  Pulmonry vascular resistance 2 woods units.     Right Coronary Artery Distribution:    The right coronary artery is a normal caliber vessel. The RCA showed no significant disease or stenosis greater than 30%. The proximal right coronary artery showed 30% stenosis.     Coronary Lesion Summary:  Vessel    Stenosis   Vessel Segment  LAD    50% stenosis      mid  RCA    30% stenosis    proximal       Hemo Personnel:  +-------------------+---------+  Name               Duty       +-------------------+---------+  Kwabena Iqbal MD 1  +-------------------+---------+       Hemodynamic Pressures:     +----+----------+---------+-------------+-------------+---+----+-------+-------+  SiteDate Time   Phase  Systolic mmHg  Diastolic  ED MeanA-Wave V-Wave                   Name                    mmHg     mmHmmHg mmHg   mmHg                                                      g                     +----+----------+---------+-------------+-------------+---+----+-------+-------+    PW 7/26/2024     Rest                               11     11     10      9:26:02 AM                                                          +----+----------+---------+-------------+-------------+---+----+-------+-------+    PW 7/26/2024     Rest                               11     10      9      9:26:13 AM                                                          +----+----------+---------+-------------+-------------+---+----+-------+-------+    PA 7/26/2024     Rest           38           14     24                    9:26:47 AM                                                          +----+----------+---------+-------------+-------------+---+----+-------+-------+    RV 7/26/2024     Rest           37            1  6                        9:27:46 AM                                                          +----+----------+---------+-------------+-------------+---+----+-------+-------+    RA 7/26/2024     Rest                                6      7      8      9:28:21 AM                                                           +----+----------+---------+-------------+-------------+---+----+-------+-------+    AO 7/26/2024     Rest          112           58     81                    9:31:41 AM                                                          +----+----------+---------+-------------+-------------+---+----+-------+-------+    AO 7/26/2024     Rest          100           52     70                    9:36:23 AM                                                          +----+----------+---------+-------------+-------------+---+----+-------+-------+    AO 7/26/2024     Rest          107           72     87                    9:42:07 AM                                                          +----+----------+---------+-------------+-------------+---+----+-------+-------+    AO 7/26/2024     Rest          105           67     84                    9:44:57 AM                                                          +----+----------+---------+-------------+-------------+---+----+-------+-------+    AO 7/26/2024     Rest          108           71     88                    9:47:47 AM                                                          +----+----------+---------+-------------+-------------+---+----+-------+-------+    AO 7/26/2024     Rest          104           66     84                    9:56:26 AM                                                          +----+----------+---------+-------------+-------------+---+----+-------+-------+    AO 7/26/2024     Rest          114           68     82                      10:10:33                                                                      AM                                                          +----+----------+---------+-------------+-------------+---+----+-------+-------+    AO 7/26/2024     Rest          119           76      97                      10:10:47                                                                      AM                                                          +----+----------+---------+-------------+-------------+---+----+-------+-------+    AO 7/26/2024     Rest           94           48     66                      10:28:39                                                                      AM                                                          +----+----------+---------+-------------+-------------+---+----+-------+-------+    AO 7/26/2024     Rest          105           55     75                      10:29:45                                                                      AM                                                          +----+----------+---------+-------------+-------------+---+----+-------+-------+    AO 7/26/2024     Rest            0            0     44                      10:32:42                                                                      AM                                                          +----+----------+---------+-------------+-------------+---+----+-------+-------+    AO 7/26/2024     Rest          101           50     71                      10:38:20                                                                      AM                                                          +----+----------+---------+-------------+-------------+---+----+-------+-------+         Oxygen Saturation %:  +-----------+----------+------------+  Sample SiteO2 Sat (%)HB (g/100ml)  +-----------+----------+------------+           FA        91        11.3  +-----------+----------+------------+          SVC        61        11.3  +-----------+----------+------------+           FA        91         11.3  +-----------+----------+------------+           PA        61        11.3  +-----------+----------+------------+       Cardiac Outputs:  +---------------+------------------+-------+  VIRAJ CO (l/min)VIRAJ CI (l/min/m2)VIRAJ SV  +---------------+------------------+-------+              6.4               2.9  125.9  +---------------+------------------+-------+       Complications:  No in-lab complications observed.     Cardiac Cath Post Procedure Notes:  Post Procedure Diagnosis: Mild diffuse coronary disease with mid LAD lesion that                            is visually 50-60% stenotic. RFR 0.95, FFR 0.85                            Normal filling pressures with normal CO/CI.  Blood Loss:               Estimated blood loss during the procedure was 10 mls.  Specimens Removed:        Number of specimen(s) removed: none.       Recommendations:  Maximize medical therapy.  Agressive risk factor modification efforts.  Medical management of coronary artery disease.    ____________________________________________________________________________________  CONCLUSIONS:   1. Diffuse mild calcific disease.   2. Mid LAD lesion that angiographically appears to be 50-60% stenotic, FFR negative.   3. RHC with normal filling pressures with normal CO and CI.    ICD 10 Codes:  Shortness of breath-R06.02; Chest pain, unspecified-R07.9     CPT Codes:  Right Heart Cath O2/Cardiac output without biopsy (RHC)-27596; Left Heart Cath (visualization of coronaries) and LV-44367; FFR, initial Vessel (PCI)-88151; Moderate Sedation Services initial 15 minutes patient >5 years-07946; Moderate Sedation Services 1st additional 15 minutes patient >5 years-69651; Moderate Sedation Services 2nd additional 15 minutes patient >5 years-13596     [id number] Kwabena Iqbal MD  Performing Physician  Electronically signed by [id number] Kwabena Iqbal MD on 7/27/2024 at 9:13:36 AM         ** Final **     === 09/07/23 ===    KNEE    - Impression -  1. Left  total knee arthroplasty without hardware complication.   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    Nonspecific dorsal subcutaneous soft tissue edema and enhancement  which could be cellulitis but is nonspecific.    Attention to the 5th digit demonstrates no specific abnormality.   === 11/13/23 ===    DEXA BONE DENSITY    - Impression -  DEXA:  According to World Health Organization criteria,  classification is normal.    Followup recommended in 2 years or sooner as clinically warranted.    All images and detailed analysis are available on the  Radiology  PACS.    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  And confirmed tophi and double contour sign today on ultrasound  Patient did not tolerate allopurinol in the past had a CADR  UA has been high in the mid 8s and last reading 6.5  Target for tophaceous gout is below 5mg/dl    I discussed with her the next best step would be to proceed with febuxostat  Although she has HFpEF, but has no CAD, therefore she is not at a higher risk for cardiovascular complications that has been a cause of concern with febuxostat  Just needs to be under closer surveillance, at least initially    The other option was discussed which is Pegloticase - for dramatic lowering of her uric acid  But it will be fraught with some challenges as she needs to be on some immunosuppression to prevent autoantibody formation against pegloticase which otherwise can cause severe rebound hyperuricemia  We will save this option as a last resort for now.  Her tophi are minimal and her last UA was around 6.5    I will also resume colchicine - the CARD was likely secondary to Allopurinol and not colchicine    I am changing her from  simvastatin to a more hydrophilic statin in a low moderate dose to prevent any significant interaction with colchicine ( atorvastatin 10 mg daily)    She will complete a 5 day course of prednisone for the current ongoing inflammation  She was strongly advised not to use celecoxib because of her heart condition and CKD and I have discontinued this medication for her    Will let her cardiologist know about some changes I made today    UA and Xrays today    Follow up x 2 months    -     XR hand 3+ views bilateral; Future  -     predniSONE (Deltasone) 10 mg tablet; Take 2 tablets (20 mg) by mouth once daily for 5 days.  -     febuxostat (Uloric) 40 mg tablet; Take 1 tablet (40 mg) by mouth once daily.  -     colchicine 0.6 mg tablet; Take 1 tablet (0.6 mg) by mouth once daily.  -     Uric Acid; Future  -     Follow Up In Rheumatology; Future  Chronic heart failure with preserved ejection fraction  -     Referral to Rheumatology  -     atorvastatin (Lipitor) 10 mg tablet; Take 1 tablet (10 mg) by mouth once daily.  Joint pain in fingers of both hands  -     Referral to Rheumatology         Naveed De Jesus MD    of Medicine  [facility] - Department of Rheumatology  [facility]   Plan, including risks and benefits, was discussed with the patient, informed on how to reach us.     To schedule an appointment, call  [phone] Shirin or call [phone] for [facility]

## 2024-12-07 LAB — URATE SERPL-MCNC: 10.3 MG/DL (ref 2.3–6.7)

## 2024-12-13 ENCOUNTER — HOSPITAL ENCOUNTER (OUTPATIENT)
Dept: RADIOLOGY | Facility: CLINIC | Age: 84
Discharge: HOME | End: 2024-12-13
Payer: MEDICARE

## 2024-12-13 VITALS — HEIGHT: 68 IN | WEIGHT: 242 LBS | BODY MASS INDEX: 36.68 KG/M2

## 2024-12-13 DIAGNOSIS — Z12.31 ENCOUNTER FOR SCREENING MAMMOGRAM FOR BREAST CANCER: ICD-10-CM

## 2024-12-13 PROCEDURE — 77067 SCR MAMMO BI INCL CAD: CPT

## 2024-12-17 ENCOUNTER — TELEMEDICINE (OUTPATIENT)
Dept: RHEUMATOLOGY | Facility: CLINIC | Age: 84
End: 2024-12-17
Payer: MEDICARE

## 2024-12-17 DIAGNOSIS — M47.819 PERIPHERAL SPONDYLOARTHRITIS: Primary | ICD-10-CM

## 2024-12-17 RX ORDER — LEFLUNOMIDE 10 MG/1
10 TABLET ORAL DAILY
Qty: 90 TABLET | Refills: 3 | Status: SHIPPED | OUTPATIENT
Start: 2024-12-17 | End: 2025-12-17

## 2024-12-17 NOTE — PROGRESS NOTES
Subjective       An interactive audio and video telecommunication system which permits real time communications between the patient (at the originating site) and provider (at the distant site) was utilized to provide this telehealth service.   Verbal consent was requested and obtained from Promise Perry on this date, 12/17/24 for a telehealth visit.      Diagnosis -   Gout - polyarticular  US evidence of double contour sign   UA > 10 with cardiometabolic risk factors    - Peripheral SpA - on xray  ( performed last visit 2 weeks ago)    Currently  On Allopurinol    RECALL  HTN, DM, HFpEF, Afib   On anticoagulation  On oral hypoglycemics  On Diuretics    Has been having episodic swelling of her MCPs and PIPs in the last year or so  Previously had an attack of acute painful episode in the foot  Was put on colchicine and allopurinol by the PCP   She developed hives from it and it was stopped subsequently.  For the last 4-6 months she has had difficulty making a fist with persistent swelling of the bilateral knuckles  Feels stiff throughout the day , and has pain in the knuckles  Difficulty grasping or holding objects  Has HFpEF , recent angio without CAD  On diuretics  Enjoys sea food occasionally  Does not consume sugary stuff anymore  Has early stage CKD  For her pains she was taking celebrex daily    Interval history  Virtual visit established for hand xrays showing concerning changes of peripheral SpA  Patient feels better on prednisone prescribed last visit  Stiffness and swelling is better    Denies any history of psoriasis ( reconfirmed from last visit)  No family hx of psoriasis as well  No axSpA complaints        Patient Active Problem List   Diagnosis    (HFpEF) heart failure with preserved ejection fraction    Anisometropia    Anterior uveitis    Arthritis    Atrial fibrillation (Multi)    Cataract of right eye    Chronic diarrhea of unknown origin    Chronic obstructive pulmonary disease (Multi)    Chronic  venous hypertension with complication    Corneal edema of right eye    Controlled type 2 diabetes mellitus without complication, without long-term current use of insulin (Multi)    Floppy eyelid syndrome of both eyes    Hypertension    Hypothyroidism    Persistent mydriasis    Prolapsed internal hemorrhoids    Pseudophakia of both eyes    Severe obesity (BMI >= 40) (Multi)    Sleep apnea, obstructive    Medicare annual wellness visit, subsequent    History of total knee replacement, left    Anemia due to acute blood loss    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    Peripheral vascular disease of foot (CMS-HCC)    Type 2 diabetes mellitus with chronic kidney disease, without long-term current use of insulin, unspecified CKD stage (Multi)    Chronic venous hypertension (idiopathic) with ulcer and inflammation of left lower extremity (Multi)    Tophaceous gout               Allergies   Allergen Reactions    Ceclor [Cefaclor] Unknown    Codeine Nausea Only    Lisinopril Other     severve hypotension    Losartan Unknown    Quinidine Unknown    Allopurinol Rash    Erythromycin Rash          Current Outpatient Medications:     acetaminophen (Tylenol) 325 mg tablet, Take 1,000 mg by mouth every 6 hours if needed., Disp: , Rfl:     AmaryL 1 mg tablet, Take 1 tablet (1 mg) by mouth once daily., Disp: 90 tablet, Rfl: 3    atorvastatin (Lipitor) 10 mg tablet, Take 1 tablet (10 mg) by mouth once daily., Disp: 30 tablet, Rfl: 11    blood sugar diagnostic (OneTouch Verio test strips) strip, Inject 1 strip under the skin once daily., Disp: 100 strip, Rfl: 3    cholecalciferol (Vitamin D-3) 50 MCG (2000 UT) tablet, Take 1 tablet (50 mcg) by mouth once daily., Disp: , Rfl:     citalopram (CeleXA) 20 mg tablet, TAKE 1 TABLET BY MOUTH ONCE  DAILY, Disp: 90 tablet, Rfl: 3    colchicine 0.6 mg tablet, Take 1 tablet (0.6 mg) by mouth once daily., Disp: 60 tablet, Rfl: 0    cyanocobalamin (Vitamin B-12) 500 mcg  tablet, Take 1 tablet (500 mcg) by mouth once daily., Disp: , Rfl:     exenatide microspheres (Bydureon BCise) 2 mg/0.85 mL auto-injector, Inject 2 mg under the skin 1 (one) time per week. Inject contents of (1) pen subcutaneously once a week as directed., Disp: 10.2 mL, Rfl: 3    febuxostat (Uloric) 40 mg tablet, Take 1 tablet (40 mg) by mouth once daily., Disp: 30 tablet, Rfl: 1    fluticasone (Flonase) 50 mcg/actuation nasal spray, Administer 2 sprays into each nostril once daily. Shake gently. Before first use, prime pump. After use, clean tip and replace cap., Disp: 16 g, Rfl: 2    hydrALAZINE (Apresoline) 50 mg tablet, TAKE 1 TABLET BY MOUTH TWICE  DAILY, Disp: 180 tablet, Rfl: 3    leflunomide (Arava) 10 mg tablet, Take 1 tablet (10 mg) by mouth once daily., Disp: 90 tablet, Rfl: 3    levothyroxine (Synthroid, Levoxyl) 150 mcg tablet, TAKE 1 TABLET BY MOUTH DAILY FOR 6 DAYS THEN ONE-HALF TABLET BY  MOUTH ON 7TH DAY, Disp: 84 tablet, Rfl: 3    metFORMIN  mg 24 hr tablet, Take 3 tablets (1,500 mg) by mouth once daily., Disp: , Rfl:     metoprolol succinate XL (Toprol-XL) 50 mg 24 hr tablet, TAKE 1 AND 1/2 TABLETS BY MOUTH  DAILY, Disp: 135 tablet, Rfl: 3    spironolactone (Aldactone) 25 mg tablet, Take 0.5 tablets (12.5 mg) by mouth once daily., Disp: 45 tablet, Rfl: 3    torsemide (Demadex) 20 mg tablet, Take 1 tablet (20 mg) by mouth every other day., Disp: 45 tablet, Rfl: 3    verapamil SR (Calan-SR) 240 mg ER tablet, Take 1 tablet (240 mg) by mouth once daily., Disp: 90 tablet, Rfl: 3    warfarin (Coumadin) 5 mg tablet, TAKE 1 TABLET BY MOUTH MONDAY  THROUGH FRIDAY AND ONE-HALF  TABLET BY MOUTH ON SATURDAYS, Disp: 84 tablet, Rfl: 2       Objective     There were no vitals taken for this visit.       Physical Exam  Bilateral synovitis of 2nd and 3rd MCP  With tophaceous nodule and dactylitis appearance of the 3rd finger  Previous visit  Point of care ultrasound confirmed the two round tophi on the  "Right 3rd PIP  And double contour sign in the rt 2nd MCP, rt 3rd MCP   And left 2nd MCP        Lab Results   Component Value Date    WBC 8.6 11/07/2024    HGB 12.1 11/07/2024    HCT 38.1 11/07/2024    MCV 94 11/07/2024     11/07/2024        Chemistry    Lab Results   Component Value Date/Time     11/07/2024 1348    K 5.2 11/07/2024 1348     11/07/2024 1348    CO2 26 11/07/2024 1348    BUN 34 (H) 11/07/2024 1348    CREATININE 1.68 (H) 11/07/2024 1348    Lab Results   Component Value Date/Time    CALCIUM 9.7 11/07/2024 1348    ALKPHOS 54 08/08/2024 1316    AST 11 08/08/2024 1316    ALT 7 08/08/2024 1316    BILITOT 0.8 08/08/2024 1316           Lab Results   Component Value Date    CRP 0.21 11/07/2024      Lab Results   Component Value Date    BOO Negative 12/03/2024    RF <10 11/07/2024    SEDRATE 10 11/07/2024      No results found for: \"CKTOTAL\"  Lab Results   Component Value Date    NEUTROABS 6.94 (H) 08/17/2023      No results found for: \"FERRITIN\"   No results found for: \"HEPATOT\", \"HEPAIGM\", \"HEPBCIGM\", \"HEPBCAB\", \"HBEAG\", \"HEPCAB\"   Lab Results   Component Value Date    ALT 7 08/08/2024    AST 11 08/08/2024    ALKPHOS 54 08/08/2024    BILITOT 0.8 08/08/2024      No results found for: \"PPD\"   Lab Results   Component Value Date    URICACID 10.3 (H) 12/06/2024      Lab Results   Component Value Date    CALCIUM 9.7 11/07/2024    PHOS 3.6 11/07/2024      No results found for: \"SPEP\", \"UPEP\"   No results found for: \"ALBUR\", \"[id number]\"   .last          XR hand 3+ views bilateral  Narrative: Interpreted By:  John Cortez,   STUDY:  XR HAND 3+ VIEWS BILATERAL; ;  12/6/2024 3:20 pm      INDICATION:  Signs/Symptoms:gouty arthritis.      ,M1A.9XX1 Chronic gout, unspecified, with tophus (tophi)      COMPARISON:  None.      ACCESSION NUMBER(S):  [id number]      ORDERING CLINICIAN:  ROSY LAWTON      FINDINGS:  Bilateral hands, three views      Severe erosive arthritis involving the proximal " interphalangeal and  distal interphalangeal joints bilaterally predominantly involving the  left 3rd and 4th PIP and 5th DIP and the right 4th and 5th PIP and  2nd and 4th DIP joints. Associated soft tissue edema about the  disease especially in the left 3rd and right 4th digits. No fracture  dislocation seen      Impression: Severe erosive changes present in the interphalangeal joints as  described above. Underlying psoriatic arthritis and reactive  arthritis i high s in the differential          MACRO:  None      Signed by: John Cortez 12/7/2024 8:35 AM  Dictation workstation:   [id number]     === 09/07/23 ===    KNEE    - Impression -  1. Left total knee arthroplasty without hardware complication.   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    Nonspecific dorsal subcutaneous soft tissue edema and enhancement  which could be cellulitis but is nonspecific.    Attention to the 5th digit demonstrates no specific abnormality.   === 11/13/23 ===    DEXA BONE DENSITY    - Impression -  DEXA:  According to World Health Organization criteria,  classification is normal.    Followup recommended in 2 years or sooner as clinically warranted.    All images and detailed analysis are available on the  Radiology  PACS.    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  And confirmed tophi and double contour sign today on ultrasound  Patient did not tolerate allopurinol in the past had a CADR  UA has been high in the mid 8s and last reading 6.5  Target for tophaceous gout is below 5mg/dl  I discussed with her the next best step would be to proceed with febuxostat  Although she has HFpEF, but has no CAD, therefore she is not at a higher risk for  cardiovascular complications that has been a cause of concern with febuxostat  Just needs to be under closer surveillance, at least initially  The other option was discussed which is Pegloticase - for dramatic lowering of her uric acid  But it will be fraught with some challenges as she needs to be on some immunosuppression to prevent autoantibody formation against pegloticase which otherwise can cause severe rebound hyperuricemia  We will save this option as a last resort for now.  Her tophi are minimal and her last UA was around 6.5  I will also resume colchicine - the CARD was likely secondary to Allopurinol and not colchicine  I am changing her from simvastatin to a more hydrophilic statin in a low moderate dose to prevent any significant interaction with colchicine ( atorvastatin 10 mg daily)  She was strongly advised not to use celecoxib because of her heart condition and CKD and I have discontinued this medication for her      #Peripheral SpA  With characteristic changes in the PIP with erosions and near pencil in cup deformity of the   3rd and 4th PIP with osteoproliferation ( US was not performed for the PIP joints)     Changes are highly suggestive of PsA  Does not have history of psoriasis, no family members with psoriasis  She cannot be on methotrexate given her CKD  I will start her on low dose leflunomide for now and titrate to 20 mg daily if tolerated  If no response, will proceed with IL17 vs IL12-23 agents  Given her hx of heart failure on diuretics, wont favor TNFi or Jakinibs  Patient was informed of these changes, and we mutually agreed to proceed with LFN  Baseline LFTs are fine  Will repeat labs + uric acid on follow up x 2 months           Naveed De Jesus MD    of Medicine  [facility] - Department of Rheumatology  [facility]   Plan, including risks and benefits, was discussed with the patient, informed on how to reach us.     To schedule an appointment, call   [phone] Shirin or call [phone] for [facility]

## 2024-12-30 DIAGNOSIS — M1A.9XX1 TOPHACEOUS GOUT: ICD-10-CM

## 2024-12-31 RX ORDER — FEBUXOSTAT 40 MG/1
40 TABLET, FILM COATED ORAL DAILY
Qty: 60 TABLET | Refills: 5 | Status: SHIPPED | OUTPATIENT
Start: 2024-12-31

## 2025-01-12 DIAGNOSIS — M19.90 ARTHRITIS: ICD-10-CM

## 2025-01-14 RX ORDER — CELECOXIB 200 MG/1
200 CAPSULE ORAL DAILY
Qty: 90 CAPSULE | Refills: 3 | Status: SHIPPED | OUTPATIENT
Start: 2025-01-14

## 2025-01-16 ENCOUNTER — APPOINTMENT (OUTPATIENT)
Dept: OPHTHALMOLOGY | Facility: CLINIC | Age: 85
End: 2025-01-16
Payer: MEDICARE

## 2025-01-16 DIAGNOSIS — Z96.1 PSEUDOPHAKIA OF BOTH EYES: ICD-10-CM

## 2025-01-16 DIAGNOSIS — H02.831 DERMATOCHALASIS OF BOTH UPPER EYELIDS: ICD-10-CM

## 2025-01-16 DIAGNOSIS — H57.04 PERSISTENT MYDRIASIS: ICD-10-CM

## 2025-01-16 DIAGNOSIS — H53.15 VISUAL DISTORTIONS OF SHAPE AND SIZE: ICD-10-CM

## 2025-01-16 DIAGNOSIS — H35.3190 AGE-RELATED MACULAR DEGENERATION WITH CENTRAL GEOGRAPHIC ATROPHY: ICD-10-CM

## 2025-01-16 DIAGNOSIS — E11.9 CONTROLLED TYPE 2 DIABETES MELLITUS WITHOUT COMPLICATION, WITHOUT LONG-TERM CURRENT USE OF INSULIN (MULTI): Primary | ICD-10-CM

## 2025-01-16 DIAGNOSIS — H02.834 DERMATOCHALASIS OF BOTH UPPER EYELIDS: ICD-10-CM

## 2025-01-16 ASSESSMENT — ENCOUNTER SYMPTOMS
ENDOCRINE NEGATIVE: 1
EYES NEGATIVE: 1

## 2025-01-16 ASSESSMENT — REFRACTION_WEARINGRX
OD_ADD: +2.75
OS_SPHERE: -0.50
OD_CYLINDER: -2.50
OS_AXIS: 082
OS_ADD: +2.75
OD_SPHERE: +1.00
OS_SPHERE: -0.50
OS_ADD: +2.75
OS_CYLINDER: -3.50
OD_CYLINDER: -2.50
OD_AXIS: 080
OS_AXIS: 082
OD_ADD: +2.75
OS_CYLINDER: -3.50
OD_AXIS: 080
OD_SPHERE: +1.00

## 2025-01-16 ASSESSMENT — CUP TO DISC RATIO
OD_RATIO: .3
OS_RATIO: .3

## 2025-01-16 ASSESSMENT — TONOMETRY
IOP_METHOD: GOLDMANN APPLANATION
OS_IOP_MMHG: 12
OD_IOP_MMHG: 12

## 2025-01-16 ASSESSMENT — PACHYMETRY
OS_CT(UM): 609
OD_CT(UM): 599

## 2025-01-16 ASSESSMENT — VISUAL ACUITY
OD_CC: J2
CORRECTION_TYPE: GLASSES
OS_CC: J2
OD_CC: 20/25
METHOD: SNELLEN - LINEAR
OS_CC: 20/30

## 2025-01-16 ASSESSMENT — CONF VISUAL FIELD
OS_SUPERIOR_NASAL_RESTRICTION: 0
OS_SUPERIOR_TEMPORAL_RESTRICTION: 0
OS_NORMAL: 1
OD_SUPERIOR_TEMPORAL_RESTRICTION: 0
OS_INFERIOR_NASAL_RESTRICTION: 0
OD_SUPERIOR_NASAL_RESTRICTION: 0
OD_NORMAL: 1
OS_INFERIOR_TEMPORAL_RESTRICTION: 0
OD_INFERIOR_TEMPORAL_RESTRICTION: 0
OD_INFERIOR_NASAL_RESTRICTION: 0

## 2025-01-16 ASSESSMENT — SLIT LAMP EXAM - LIDS
COMMENTS: 3+ DERMATOCHALASIS
COMMENTS: 3+ DERMATOCHALASIS

## 2025-01-16 ASSESSMENT — EXTERNAL EXAM - RIGHT EYE: OD_EXAM: NORMAL

## 2025-01-16 ASSESSMENT — EXTERNAL EXAM - LEFT EYE: OS_EXAM: NORMAL

## 2025-01-16 NOTE — PROGRESS NOTES
Diabetes mellitus without ophthalmic manifestations~E11.9   No DR OU  Last A1c 6.6 (11/2024)   Encouraged good blood sugar and blood pressure control       Persistent mydriasis~H57.04   Stable   Pt is asymptomatic      Dermatochalasis of right upper eyelid~H02.831   Dermatochalasis of left upper eyelid~H02.834  Evaluated previously by Dr. 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

## 2025-01-16 NOTE — LETTER
Pharyngitis     BASIC INFORMATION  Description  Inflammation or infection of the pharynx. The pharynx is the hollow passage at the back of the throat. It is made up of the nasopharynx, which leads to the nose and oropharynx which leads to the mouth. The larynx (voice box) is located below the pharynx. Pharyngitis occurs in all age groups, but most often affects children.    Frequent Signs and Symptoms  · Sore throat  · Swallowing difficulty  · Tickle or \"lump\" in the throat.  · Fever  · Swollen glands in the neck (sometimes)  · Throat maybe red or covered with a white or grayish membrane (sometimes)  · Body aches (sometimes)    Causes  Viral infection (most common cause) or bacterial infection (such as streptococcus). The germs are spread by person-to-person contact. Rarely, there may be other causes, such as irritation.    Risk increases with  · Common cold, flu, or seasonal allergies.  · Weak immune system due to illness or drugs.  · Smoking or second-hand smoke.  · Chronic illness, such as diabetes.  · Close quarters, such as with  recruits, in schools, and  centers.    Preventive Measures  · Avoid close contact with anyone with a sore throat.  · Avoid germs. Wash hands often, especially children.    Expected Outcomes  Most cases of viral infection clear up on their own in a week. Antibiotic drugs can successfully treat bacterial infections. Complications are rare.    Possible Complications  · Airways may become blocked.  · Abscess (pus-filled area of infection)   · Rheumatic fever, scarlet fever, or glomerulonephritis, if pharyngitis is caused by streptococcal bacteria and does not receive adequate antibiotic treatment.     DIAGNOSIS & TREATMENT  General Measures  · Your health care provider will do an exam of the throat, ears, nose, neck, and lungs. Medical tests may include blood study and throat culture (from a swab of the throat), or rapid strep test find the type of infection.  January 16, 2025    Thomas Patino MD  [address]  [address]  [facility] [address]    Patient: Promise Perry   YOB: 1940   Date of Visit: 1/16/2025       Dear Dr. Thomas Patino MD:    Thank you for referring Promise Perry to me for evaluation. Below are the relevant portions of the exam, along with my assessment and plan of care.    Vision readings for this visit:  Visual Acuity (Snellen - Linear)         Right Left    Dist cc 20/25 20/30    Near cc J2 J2      Correction: Glasses          Tonometry (Goldmann Applanation, 1:49 PM)         Right Left    Pressure 12 12            Assessment/Plan:       Diabetes mellitus without ophthalmic manifestations~E11.9   No DR OU  Last A1c 6.6 (11/2024)   Encouraged good blood sugar and blood pressure control       Persistent mydriasis~H57.04   Stable   Pt is asymptomatic      Dermatochalasis of right upper eyelid~H02.831   Dermatochalasis of left upper eyelid~H02.834  Evaluated previously by Dr. Dee and is not indicated for sx          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~H35.3190   Mac OCT done today and shows stable area of GA OD   Monitor     1 year for DFE and mac OCT  If you have questions, please do not hesitate to call me. I look forward to following Promise along with you.         Sincerely,        Areli Menjivar MD        CC:   No Recipients       · Treatment will include self-care measures and antibiotic drugs for bacterial infections. Antibiotics will not help viral infections.  · To relieve the sore throat, gargle frequently with warm or cold double-strength tea or warm salt water (mix on-half teaspoon of salt in one cup of water).   · Wash hands often to help prevent the spread of germs to other family members. Avoid kissing or sharing cups or other utensils.     Medications  · For minor discomfort, you may use nonprescription drugs such as ibuprofen. Don't give aspirin to a child.   · Nonprescription throat lozenges (for patients over age 3) may help ease discomfort.  · Antibiotic drugs are usually prescribed for bacterial infection. Finish entire course of drugs even if symptoms improve.    Activity  Return to normal activities as symptoms improve. A person can no longer spread the germs if they have taken antibiotic drug for at least 24 hours.    Diet  Drink plenty of fluids. If swallowing solid food is painful, try a liquid or soft diet for a few days.    NOTIFY OUR OFFICE IF  · You or a family member has symptoms of pharyngitis  · The following occur during treatment  · Breathing/swallowing difficulty or chest pain.  · Fever worsens or severe headache develops.  · Thick mucous drainage from the nose.  · Cough that produces colored or bloody sputum.  · Skin rash.  · Dark urine.     Copyright © 2005 by [employer]. All rights reserved

## 2025-02-06 LAB
25(OH)D3+25(OH)D2 SERPL-MCNC: 46 NG/ML (ref 30–100)
ALBUMIN SERPL-MCNC: 4.2 G/DL (ref 3.6–5.1)
ALP SERPL-CCNC: 57 U/L (ref 37–153)
ALT SERPL-CCNC: 10 U/L (ref 6–29)
ANION GAP SERPL CALCULATED.4IONS-SCNC: 12 MMOL/L (CALC) (ref 7–17)
AST SERPL-CCNC: 13 U/L (ref 10–35)
BILIRUB SERPL-MCNC: 0.8 MG/DL (ref 0.2–1.2)
BUN SERPL-MCNC: 39 MG/DL (ref 7–25)
CALCIUM SERPL-MCNC: 9.9 MG/DL (ref 8.6–10.4)
CHLORIDE SERPL-SCNC: 98 MMOL/L (ref 98–110)
CHOLEST SERPL-MCNC: 146 MG/DL
CHOLEST/HDLC SERPL: 3.3 (CALC)
CO2 SERPL-SCNC: 26 MMOL/L (ref 20–32)
CREAT SERPL-MCNC: 1.67 MG/DL (ref 0.6–0.95)
EGFRCR SERPLBLD CKD-EPI 2021: 30 ML/MIN/1.73M2
ERYTHROCYTE [DISTWIDTH] IN BLOOD BY AUTOMATED COUNT: 12.8 % (ref 11–15)
EST. AVERAGE GLUCOSE BLD GHB EST-MCNC: 174 MG/DL
EST. AVERAGE GLUCOSE BLD GHB EST-SCNC: 9.7 MMOL/L
GLUCOSE SERPL-MCNC: 253 MG/DL (ref 65–99)
HBA1C MFR BLD: 7.7 % OF TOTAL HGB
HCT VFR BLD AUTO: 42 % (ref 35–45)
HDLC SERPL-MCNC: 44 MG/DL
HGB BLD-MCNC: 13.6 G/DL (ref 11.7–15.5)
INR PPP: 2.1
LDLC SERPL CALC-MCNC: 79 MG/DL (CALC)
MCH RBC QN AUTO: 29.9 PG (ref 27–33)
MCHC RBC AUTO-ENTMCNC: 32.4 G/DL (ref 32–36)
MCV RBC AUTO: 92.3 FL (ref 80–100)
NONHDLC SERPL-MCNC: 102 MG/DL (CALC)
PLATELET # BLD AUTO: 228 THOUSAND/UL (ref 140–400)
PMV BLD REES-ECKER: 9.8 FL (ref 7.5–12.5)
POTASSIUM SERPL-SCNC: 5.3 MMOL/L (ref 3.5–5.3)
PROT SERPL-MCNC: 6.6 G/DL (ref 6.1–8.1)
PROTHROMBIN TIME: 21.1 SEC (ref 9–11.5)
RBC # BLD AUTO: 4.55 MILLION/UL (ref 3.8–5.1)
SODIUM SERPL-SCNC: 136 MMOL/L (ref 135–146)
TRIGL SERPL-MCNC: 130 MG/DL
TSH SERPL-ACNC: 0.89 MIU/L (ref 0.4–4.5)
VIT B12 SERPL-MCNC: 952 PG/ML (ref 200–1100)
WBC # BLD AUTO: 7.1 THOUSAND/UL (ref 3.8–10.8)

## 2025-02-07 ENCOUNTER — APPOINTMENT (OUTPATIENT)
Facility: CLINIC | Age: 85
End: 2025-02-07
Payer: MEDICARE

## 2025-02-12 ENCOUNTER — APPOINTMENT (OUTPATIENT)
Dept: PRIMARY CARE | Facility: CLINIC | Age: 85
End: 2025-02-12
Payer: MEDICARE

## 2025-02-12 ENCOUNTER — HOSPITAL ENCOUNTER (OUTPATIENT)
Dept: RADIOLOGY | Facility: CLINIC | Age: 85
Discharge: HOME | End: 2025-02-12
Payer: MEDICARE

## 2025-02-12 VITALS
HEIGHT: 68 IN | HEART RATE: 97 BPM | SYSTOLIC BLOOD PRESSURE: 138 MMHG | DIASTOLIC BLOOD PRESSURE: 80 MMHG | BODY MASS INDEX: 38.19 KG/M2 | OXYGEN SATURATION: 98 % | RESPIRATION RATE: 14 BRPM | WEIGHT: 252 LBS

## 2025-02-12 DIAGNOSIS — I48.91 ATRIAL FIBRILLATION, UNSPECIFIED TYPE (MULTI): ICD-10-CM

## 2025-02-12 DIAGNOSIS — E55.9 MILD VITAMIN D DEFICIENCY: ICD-10-CM

## 2025-02-12 DIAGNOSIS — I50.33 ACUTE ON CHRONIC HEART FAILURE WITH PRESERVED EJECTION FRACTION: ICD-10-CM

## 2025-02-12 DIAGNOSIS — M10.9 GOUT, UNSPECIFIED CAUSE, UNSPECIFIED CHRONICITY, UNSPECIFIED SITE: Primary | ICD-10-CM

## 2025-02-12 DIAGNOSIS — E13.621 OTHER SPECIFIED DIABETES MELLITUS WITH FOOT ULCER (CODE): ICD-10-CM

## 2025-02-12 DIAGNOSIS — J44.89 OTHER SPECIFIED CHRONIC OBSTRUCTIVE PULMONARY DISEASE: ICD-10-CM

## 2025-02-12 DIAGNOSIS — N18.32 CHRONIC KIDNEY DISEASE, STAGE 3B (MULTI): ICD-10-CM

## 2025-02-12 DIAGNOSIS — E78.5 HYPERLIPIDEMIA, UNSPECIFIED HYPERLIPIDEMIA TYPE: ICD-10-CM

## 2025-02-12 DIAGNOSIS — R91.8 LUNG NODULES: ICD-10-CM

## 2025-02-12 PROCEDURE — 99214 OFFICE O/P EST MOD 30 MIN: CPT | Performed by: INTERNAL MEDICINE

## 2025-02-12 PROCEDURE — 3075F SYST BP GE 130 - 139MM HG: CPT | Performed by: INTERNAL MEDICINE

## 2025-02-12 PROCEDURE — 1123F ACP DISCUSS/DSCN MKR DOCD: CPT | Performed by: INTERNAL MEDICINE

## 2025-02-12 PROCEDURE — 1157F ADVNC CARE PLAN IN RCRD: CPT | Performed by: INTERNAL MEDICINE

## 2025-02-12 PROCEDURE — 3079F DIAST BP 80-89 MM HG: CPT | Performed by: INTERNAL MEDICINE

## 2025-02-12 PROCEDURE — 71250 CT THORAX DX C-: CPT

## 2025-02-12 NOTE — PROGRESS NOTES
"Subjective   Promise Perry is a 84 y.o. female who presents for  follow up   Med list will call or send my chart msg with info and question med     HPI   A1C WAS UP.  BUT IN RETROSPECT COULDN'T REFILL HER BYDUREON BECAUSE WAS IN THE [place]UT HOLE    HAS SINCE RESTARTED BYDUREON    SAW RHEUMATOLOGIST, DID STUDY ON HANDS, HAS SOME GOUT BUT ALSO EVIDENCE FOR PSORIATIC ARTHRITIS    MADE SOME CHANGES IN MEDS RECS    STARTED FEBUXOSTAT, AND NEW SATATIN LIPITOR, BUT HAS NOT STARTED ARAVA FOR PSORIATIC ARTHRITIS YET?      Review of Systems   Constitutional:  Negative for chills, diaphoresis and fever.   Respiratory:  Negative for cough.    Cardiovascular:  Negative for chest pain and leg swelling.   Gastrointestinal:  Negative for constipation, diarrhea, nausea and vomiting.   Musculoskeletal:  Negative for joint swelling and myalgias.       Objective   /80   Pulse 97   Resp 14   Ht 1.727 m (5' 8\")   Wt 114 kg (252 lb)   SpO2 98%   BMI 38.32 kg/m²     Physical Exam  Vitals reviewed.   Constitutional:       General: She is not in acute distress.     Appearance: She is obese. She is not ill-appearing.   Cardiovascular:      Rate and Rhythm: Normal rate and regular rhythm.      Pulses: Normal pulses.      Heart sounds:      No gallop.   Pulmonary:      Breath sounds: Normal breath sounds. No wheezing, rhonchi or rales.   Abdominal:      General: Abdomen is flat. Bowel sounds are normal.      Palpations: Abdomen is soft.      Tenderness: There is no guarding or rebound.   Musculoskeletal:      Right lower leg: No edema.      Left lower leg: No edema.   Neurological:      General: No focal deficit present.      Mental Status: She is oriented to person, place, and time.   Psychiatric:         Mood and Affect: Mood normal.         Behavior: Behavior normal.         Assessment/Plan   Problem List Items Addressed This Visit       Atrial fibrillation (Multi)     FOLLOWED BY CARDIOLOGY         Chronic obstructive " pulmonary disease (Multi)     STABLE ON PRESENT REGIMEN         Severe obesity (BMI >= 40) (Multi)     WEIGHT LOSS ENCOURAGED         Type 2 diabetes mellitus with chronic kidney disease, without long-term current use of insulin, unspecified CKD stage (Multi)     MONITORED BY ENDOCRINOLOGY         Acute on chronic heart failure with preserved ejection fraction     FOLLOWED BY CARDIOLOGY         Chronic kidney disease, stage 3b (Multi)     MONITOR WITH SERIAL LAB           Other Visit Diagnoses       Gout, unspecified cause, unspecified chronicity, unspecified site    -  Primary    Relevant Orders    Uric Acid    C-Reactive Protein    Sedimentation Rate    Hyperlipidemia, unspecified hyperlipidemia type        Relevant Orders    TSH with reflex to Free T4 if abnormal    Comprehensive Metabolic Panel    CBC    Mild vitamin D deficiency        Relevant Orders    Vitamin D 25-Hydroxy,Total (for eval of Vitamin D levels)          Patient Instructions    IF YOU RUN INTO TROUBLE GETTING YOUR MEDICATIONS FOR ANY REASON, PLEASE LET ME KNOW AND I CAN ASK OUR PHARMACY CONSULTANT TEAM TO TOUCH BASE WITH YOU TO TRY TO MAKE SURE YOU CAN GET YOUR MEDS    2.  THE TWO MEDS I THINK YOU HAVE STARTED FROM THE RHEUMATOLOGIST ARE FEBUXOSTAT FOR GOUT, AND ATORVASTATIN FOR CHOLESTEROL.    3.  THERE WAS MENTION OF STARTING ARAVA/LEFLUNOMIDE AS A MEDICATION TO HELP CONTROL ANY COMPONENT OF PSORIATIC ARTHRITIS THAT IS CONTRIBUTING TO YOUR PAIN.  PHARMACY CONSULTANTS CAN HELP ADDRESS YOUR CONCERNS ABOUT THIS MEDICATION IF YOU WISH    4.  NON-FASTING LABS ARE ORDERED FOR PRIOR TO NEXT VISIT IN 4-6 MONTHS

## 2025-02-12 NOTE — PATIENT INSTRUCTIONS
IF YOU RUN INTO TROUBLE GETTING YOUR MEDICATIONS FOR ANY REASON, PLEASE LET ME KNOW AND I CAN ASK OUR PHARMACY CONSULTANT TEAM TO TOUCH BASE WITH YOU TO TRY TO MAKE SURE YOU CAN GET YOUR MEDS    2.  THE TWO MEDS I THINK YOU HAVE STARTED FROM THE RHEUMATOLOGIST ARE FEBUXOSTAT FOR GOUT, AND ATORVASTATIN FOR CHOLESTEROL.    3.  THERE WAS MENTION OF STARTING ARAVA/LEFLUNOMIDE AS A MEDICATION TO HELP CONTROL ANY COMPONENT OF PSORIATIC ARTHRITIS THAT IS CONTRIBUTING TO YOUR PAIN.  PHARMACY CONSULTANTS CAN HELP ADDRESS YOUR CONCERNS ABOUT THIS MEDICATION IF YOU WISH    4.  NON-FASTING LABS ARE ORDERED FOR PRIOR TO NEXT VISIT IN 4-6 MONTHS

## 2025-02-16 PROBLEM — N18.32 CHRONIC KIDNEY DISEASE, STAGE 3B (MULTI): Status: ACTIVE | Noted: 2025-02-16

## 2025-02-16 PROBLEM — I50.33 ACUTE ON CHRONIC HEART FAILURE WITH PRESERVED EJECTION FRACTION: Status: ACTIVE | Noted: 2025-02-16

## 2025-02-16 PROBLEM — I87.332 CHRONIC VENOUS HYPERTENSION (IDIOPATHIC) WITH ULCER AND INFLAMMATION OF LEFT LOWER EXTREMITY (MULTI): Status: RESOLVED | Noted: 2024-11-12 | Resolved: 2025-02-16

## 2025-02-16 ASSESSMENT — ENCOUNTER SYMPTOMS
DIAPHORESIS: 0
FEVER: 0
COUGH: 0
JOINT SWELLING: 0
CHILLS: 0
MYALGIAS: 0
VOMITING: 0
NAUSEA: 0
CONSTIPATION: 0
DIARRHEA: 0

## 2025-02-27 DIAGNOSIS — M1A.9XX1 TOPHACEOUS GOUT: ICD-10-CM

## 2025-02-27 RX ORDER — COLCHICINE 0.6 MG/1
0.6 TABLET ORAL 3 TIMES WEEKLY
Qty: 30 TABLET | Refills: 1 | Status: SHIPPED | OUTPATIENT
Start: 2025-02-28

## 2025-03-12 ENCOUNTER — APPOINTMENT (OUTPATIENT)
Facility: CLINIC | Age: 85
End: 2025-03-12
Payer: MEDICARE

## 2025-03-12 VITALS
DIASTOLIC BLOOD PRESSURE: 82 MMHG | HEIGHT: 68 IN | HEART RATE: 77 BPM | WEIGHT: 231 LBS | TEMPERATURE: 97.2 F | SYSTOLIC BLOOD PRESSURE: 149 MMHG | BODY MASS INDEX: 35.01 KG/M2

## 2025-03-12 DIAGNOSIS — L40.50 PSORIATIC ARTHRITIS (MULTI): Primary | ICD-10-CM

## 2025-03-12 DIAGNOSIS — M1A.9XX1 TOPHACEOUS GOUT: ICD-10-CM

## 2025-03-12 RX ORDER — FEBUXOSTAT 40 MG/1
80 TABLET, FILM COATED ORAL DAILY
Qty: 60 TABLET | Refills: 5 | Status: SHIPPED | OUTPATIENT
Start: 2025-03-12 | End: 2025-09-08

## 2025-03-12 RX ORDER — PREDNISONE 10 MG/1
30 TABLET ORAL DAILY
Qty: 15 TABLET | Refills: 0 | Status: SHIPPED | OUTPATIENT
Start: 2025-03-12 | End: 2025-03-17

## 2025-03-12 NOTE — PROGRESS NOTES
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  For the last 4-6 months she has had difficulty making a fist with persistent swelling of the bilateral knuckles  Feels stiff throughout the day , and has pain in the knuckles  Difficulty grasping or holding objects  Has HFpEF , recent angio without CAD  On diuretics  Enjoys sea food occasionally  Does not consume sugary stuff anymore  Has early stage CKD  For her pains she was taking celebrex daily    Interval history  Virtual visit established for hand xrays showing concerning changes of peripheral SpA  Patient feels better on prednisone prescribed last visit  Stiffness and swelling is better  Denies any history of psoriasis ( reconfirmed from last visit)  No family hx of psoriasis as well  No axSpA complaints         Allergies   Allergen Reactions    Ceclor [Cefaclor] Unknown    Codeine Nausea Only    Lisinopril Other     severve hypotension    Losartan Unknown    Quinidine Unknown    Allopurinol Rash    Erythromycin Rash          Current Outpatient Medications:     acetaminophen (Tylenol) 325 mg tablet, Take 1,000 mg by mouth every 6 hours if needed., Disp: , Rfl:     AmaryL 1 mg tablet, Take 1 tablet (1 mg) by  mouth once daily., Disp: 90 tablet, Rfl: 3    atorvastatin (Lipitor) 10 mg tablet, Take 1 tablet (10 mg) by mouth once daily., Disp: 30 tablet, Rfl: 11    blood sugar diagnostic (OneTouch Verio test strips) strip, Inject 1 strip under the skin once daily., Disp: 100 strip, Rfl: 3    celecoxib (CeleBREX) 200 mg capsule, TAKE 1 CAPSULE BY MOUTH ONCE  DAILY, Disp: 90 capsule, Rfl: 3    cholecalciferol (Vitamin D-3) 50 MCG (2000 UT) tablet, Take 1 tablet (50 mcg) by mouth once daily., Disp: , Rfl:     citalopram (CeleXA) 20 mg tablet, TAKE 1 TABLET BY MOUTH ONCE  DAILY, Disp: 90 tablet, Rfl: 3    colchicine 0.6 mg tablet, Take 1 tablet (0.6 mg) by mouth 3 (three) times a week., Disp: 30 tablet, Rfl: 1    cyanocobalamin (Vitamin B-12) 500 mcg tablet, Take 1 tablet (500 mcg) by mouth once daily., Disp: , Rfl:     exenatide microspheres (ByGrupo Ase) 2 mg/0.85 mL auto-injector, Inject 2 mg under the skin 1 (one) time per week. Inject contents of (1) pen subcutaneously once a week as directed., Disp: 10.2 mL, Rfl: 3    febuxostat (Uloric) 40 mg tablet, TAKE 1 TABLET BY MOUTH ONCE  DAILY, Disp: 60 tablet, Rfl: 5    fluticasone (Flonase) 50 mcg/actuation nasal spray, Administer 2 sprays into each nostril once daily. Shake gently. Before first use, prime pump. After use, clean tip and replace cap., Disp: 16 g, Rfl: 2    hydrALAZINE (Apresoline) 50 mg tablet, TAKE 1 TABLET BY MOUTH TWICE  DAILY, Disp: 180 tablet, Rfl: 3    leflunomide (Arava) 10 mg tablet, Take 1 tablet (10 mg) by mouth once daily., Disp: 90 tablet, Rfl: 3    levothyroxine (Synthroid, Levoxyl) 150 mcg tablet, TAKE 1 TABLET BY MOUTH DAILY FOR 6 DAYS THEN ONE-HALF TABLET BY  MOUTH ON 7TH DAY, Disp: 84 tablet, Rfl: 3    metFORMIN  mg 24 hr tablet, Take 3 tablets (1,500 mg) by mouth once daily., Disp: , Rfl:     metoprolol succinate XL (Toprol-XL) 50 mg 24 hr tablet, TAKE 1 AND 1/2 TABLETS BY MOUTH  DAILY, Disp: 135 tablet, Rfl: 3    spironolactone  "(Aldactone) 25 mg tablet, Take 0.5 tablets (12.5 mg) by mouth once daily., Disp: 45 tablet, Rfl: 3    torsemide (Demadex) 20 mg tablet, Take 1 tablet (20 mg) by mouth every other day., Disp: 45 tablet, Rfl: 3    verapamil SR (Calan-SR) 240 mg ER tablet, Take 1 tablet (240 mg) by mouth once daily., Disp: 90 tablet, Rfl: 3    warfarin (Coumadin) 5 mg tablet, TAKE 1 TABLET BY MOUTH MONDAY  THROUGH FRIDAY AND ONE-HALF  TABLET BY MOUTH ON SATURDAYS, Disp: 84 tablet, Rfl: 2       Objective     Visit Vitals  /82   Pulse 77   Temp 36.2 °C (97.2 °F)          Physical Exam  Bilateral synovitis of 2nd and 3rd MCP  With tophaceous nodule and dactylitis appearance of the 3rd finger  Previous visit  Point of care ultrasound confirmed the two round tophi on the Right 3rd PIP  And double contour sign in the rt 2nd MCP, rt 3rd MCP   And left 2nd MCP       Today   Has synovial thickening 4th PIP and synovial thickening of the 3rd MCP b/l  Visible tophi over the PIP     Lab Results   Component Value Date    WBC 7.1 02/05/2025    HGB 13.6 02/05/2025    HCT 42.0 02/05/2025    MCV 92.3 02/05/2025     02/05/2025        Chemistry    Lab Results   Component Value Date/Time     02/05/2025 1438    K 5.3 02/05/2025 1438    CL 98 02/05/2025 1438    CO2 26 02/05/2025 1438    BUN 39 (H) 02/05/2025 1438    CREATININE 1.67 (H) 02/05/2025 1438    Lab Results   Component Value Date/Time    CALCIUM 9.9 02/05/2025 1438    ALKPHOS 57 02/05/2025 1438    AST 13 02/05/2025 1438    ALT 10 02/05/2025 1438    BILITOT 0.8 02/05/2025 1438           Lab Results   Component Value Date    CRP 0.21 11/07/2024      Lab Results   Component Value Date    BOO Negative 12/03/2024    RF <10 11/07/2024    SEDRATE 10 11/07/2024      No results found for: \"CKTOTAL\"  Lab Results   Component Value Date    NEUTROABS 6.94 (H) 08/17/2023      No results found for: \"FERRITIN\"   No results found for: \"HEPATOT\", \"HEPAIGM\", \"HEPBCIGM\", \"HEPBCAB\", \"HBEAG\", \"HEPCAB\" " "  Lab Results   Component Value Date    ALT 10 02/05/2025    AST 13 02/05/2025    ALKPHOS 57 02/05/2025    BILITOT 0.8 02/05/2025      No results found for: \"PPD\"   Lab Results   Component Value Date    URICACID 10.3 (H) 12/06/2024      Lab Results   Component Value Date    CALCIUM 9.9 02/05/2025    PHOS 3.6 11/07/2024      No results found for: \"SPEP\", \"UPEP\"   No results found for: \"ALBUR\", \"CIM35CYE\"   .last          CT chest wo IV contrast  Narrative: Interpreted By:  Reji Green,   STUDY:  CT CHEST WO IV CONTRAST;  2/12/2025 2:12 pm      INDICATION:  Signs/Symptoms:RIGHT LUNG NODULES ON PRIOR CT CHEST [id number].      ,R91.8 Other nonspecific abnormal finding of lung field      COMPARISON:  04/04/2024      ACCESSION NUMBER(S):  [id number]      ORDERING CLINICIAN:  FRANK CORRALES      TECHNIQUE:  Helical data acquisition of the chest was obtained without the use of  IV contrast. Images were reformatted in axial, coronal, and sagittal  planes.      FINDINGS:  POTENTIAL LIMITATIONS OF THE STUDY:   Lack of IV contrast      HEART AND VESSELS:      There are atherosclerotic calcifications of the aorta and its  branches. The aorta is unchanged in course and caliber. Aberrant  right subclavian artery noted. Prominence of the central pulmonary  arteries suggest pulmonary hypertension and is unchanged.      Heart is enlarged but unchanged in size.      No pericardial effusion is seen.      MEDIASTINUM AND DARRELL, LOWER NECK AND AXILLA:      No evidence of thoracic lymphadenopathy by CT criteria.      Esophagus appears within normal limits as seen.      LUNGS AND AIRWAYS:  The trachea and central airways are patent. No endobronchial lesion.  Minimal secretions or less likely aspiration layering along the  posterior aspect of the trachea.      No focal areas of consolidation are noted. No effusion or  pneumothorax is seen. There are mild scattered areas of  atelectasis/scarring in the lungs. There are scattered nodules in " the  lungs measuring up to 1.0 cm in the right upper lobe, image 102.  These are described on the previous study and are not significantly  changed in size. The previously described new nodules in the right  lower lobe have resolved and were likely infectious/inflammatory in  nature. Few calcified granulomas are noted. No new pulmonary nodules.      UPPER ABDOMEN:  The visualized subdiaphragmatic structures demonstrate no acute  abnormality.      CHEST WALL AND OSSEOUS STRUCTURES:  Degenerative changes. No acute process.      Impression: Stable scattered nodules in the lungs measuring up to 10 mm in the  right upper lobe. No new pulmonary nodule or mass. Interval  resolution of the previously described new nodules.      MACRO:  None.      Signed by: Reji Green 2/13/2025 5:25 PM  Dictation workstation:   [id number]     === 09/07/23 ===    KNEE    - Impression -  1. Left total knee arthroplasty without hardware complication.   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    Nonspecific dorsal subcutaneous soft tissue edema and enhancement  which could be cellulitis but is nonspecific.    Attention to the 5th digit demonstrates no specific abnormality.   === 11/13/23 ===    DEXA BONE DENSITY    - Impression -  DEXA:  According to World Health Organization criteria,  classification is normal.    Followup recommended in 2 years or sooner as clinically warranted.    All images and detailed analysis are available on the  Radiology  PACS.    MACRO:  None    Signed by: vYan Mcgrath 11/16/2023 8:31 AM  Dictation workstation:   [id number]       Assessment/Plan   Diagnoses and all orders for this visit:    Tophaceous gout  Now chronic gouty arthropathy with persistent synovitis  And confirmed tophi and double contour sign today on ultrasound  Patient did not tolerate allopurinol in the past had a CADR  UA has been high in the mid 8s and last  reading 6.5  Target for tophaceous gout is below 5mg/dl  I discussed with her the next best step would be to proceed with febuxostat  Although she has HFpEF, but has no CAD, therefore she is not at a higher risk for cardiovascular complications that has been a cause of concern with febuxostat  Just needs to be under closer surveillance, at least initially  The other option was discussed which is Pegloticase - for dramatic lowering of her uric acid  But it will be fraught with some challenges as she needs to be on some immunosuppression to prevent autoantibody formation against pegloticase which otherwise can cause severe rebound hyperuricemia  We will save this option as a last resort for now.  Her tophi are minimal and her last UA was around 6.5  I will also resume colchicine - the CARD was likely secondary to Allopurinol and not colchicine  I am changing her from simvastatin to a more hydrophilic statin in a low moderate dose to prevent any significant interaction with colchicine ( atorvastatin 10 mg daily)  She was strongly advised not to use celecoxib because of her heart condition and CKD and I have discontinued this medication for her    3/12/2025  Last UA is around 10 mg/dl  Increase Febuxostat to 80 mg daily  Stop colchicine - patient had allergic reaction previously   Prednisone PRN for a flare    #Peripheral SpA  With characteristic changes in the PIP with erosions and near pencil in cup deformity of the   3rd and 4th PIP with osteoproliferation ( US was not performed for the PIP joints)   Changes are highly suggestive of PsA  Does not have history of psoriasis, no family members with psoriasis  She cannot be on methotrexate given her CKD    Discussed leflunomide 10 mg is a low dose and she does not have any underlying lier problem  She afreed to take it and will see response on follow up  Advised to increase to 20mg daily if she tolerated     Check Surveillance labs and uric acid     If no response, will  proceed with IL17 vs IL12-23 agents  Given her hx of heart failure on diuretics, wont favor TNFi or Jakinibs    Follow up x 3mo     Naveed De Jesus MD    of Medicine  [facility] - Department of Rheumatology  [facility]   Plan, including risks and benefits, was discussed with the patient, informed on how to reach us.     To schedule an appointment, call  [phone] [place] or call [phone] for [facility]

## 2025-03-12 NOTE — PATIENT INSTRUCTIONS
Continue taking leflunomide 10 mg daily    increase Feboxustat 80 mg daily     Prednisone 30 mg for 5 days only if you have a gout flare    Stop colchicine     Follow upx 3 months

## 2025-03-19 LAB
ALBUMIN SERPL-MCNC: 4.3 G/DL (ref 3.6–5.1)
ALP SERPL-CCNC: 62 U/L (ref 37–153)
ALT SERPL-CCNC: 12 U/L (ref 6–29)
ANION GAP SERPL CALCULATED.4IONS-SCNC: 9 MMOL/L (CALC) (ref 7–17)
AST SERPL-CCNC: 13 U/L (ref 10–35)
BASOPHILS # BLD AUTO: 53 CELLS/UL (ref 0–200)
BASOPHILS NFR BLD AUTO: 0.6 %
BILIRUB SERPL-MCNC: 0.8 MG/DL (ref 0.2–1.2)
BUN SERPL-MCNC: 36 MG/DL (ref 7–25)
CALCIUM SERPL-MCNC: 9.6 MG/DL (ref 8.6–10.4)
CHLORIDE SERPL-SCNC: 101 MMOL/L (ref 98–110)
CO2 SERPL-SCNC: 27 MMOL/L (ref 20–32)
CREAT SERPL-MCNC: 1.82 MG/DL (ref 0.6–0.95)
EGFRCR SERPLBLD CKD-EPI 2021: 27 ML/MIN/1.73M2
EOSINOPHIL # BLD AUTO: 160 CELLS/UL (ref 15–500)
EOSINOPHIL NFR BLD AUTO: 1.8 %
ERYTHROCYTE [DISTWIDTH] IN BLOOD BY AUTOMATED COUNT: 12.8 % (ref 11–15)
GLUCOSE SERPL-MCNC: 178 MG/DL (ref 65–139)
HCT VFR BLD AUTO: 40.3 % (ref 35–45)
HGB BLD-MCNC: 13.3 G/DL (ref 11.7–15.5)
LYMPHOCYTES # BLD AUTO: 1282 CELLS/UL (ref 850–3900)
LYMPHOCYTES NFR BLD AUTO: 14.4 %
MCH RBC QN AUTO: 31.3 PG (ref 27–33)
MCHC RBC AUTO-ENTMCNC: 33 G/DL (ref 32–36)
MCV RBC AUTO: 94.8 FL (ref 80–100)
MONOCYTES # BLD AUTO: 703 CELLS/UL (ref 200–950)
MONOCYTES NFR BLD AUTO: 7.9 %
NEUTROPHILS # BLD AUTO: 6702 CELLS/UL (ref 1500–7800)
NEUTROPHILS NFR BLD AUTO: 75.3 %
PLATELET # BLD AUTO: 213 THOUSAND/UL (ref 140–400)
PMV BLD REES-ECKER: 9.3 FL (ref 7.5–12.5)
POTASSIUM SERPL-SCNC: 5.3 MMOL/L (ref 3.5–5.3)
PROT SERPL-MCNC: 6.8 G/DL (ref 6.1–8.1)
RBC # BLD AUTO: 4.25 MILLION/UL (ref 3.8–5.1)
SODIUM SERPL-SCNC: 137 MMOL/L (ref 135–146)
URATE SERPL-MCNC: 5.5 MG/DL (ref 2.5–7)
WBC # BLD AUTO: 8.9 THOUSAND/UL (ref 3.8–10.8)

## 2025-04-09 ENCOUNTER — OFFICE VISIT (OUTPATIENT)
Dept: CARDIOLOGY | Facility: CLINIC | Age: 85
End: 2025-04-09
Payer: MEDICARE

## 2025-04-09 VITALS
BODY MASS INDEX: 35.77 KG/M2 | DIASTOLIC BLOOD PRESSURE: 85 MMHG | WEIGHT: 236 LBS | HEIGHT: 68 IN | HEART RATE: 94 BPM | RESPIRATION RATE: 16 BRPM | SYSTOLIC BLOOD PRESSURE: 127 MMHG

## 2025-04-09 DIAGNOSIS — I87.393 CHRONIC VENOUS HYPERTENSION WITH COMPLICATION INVOLVING BOTH SIDES: Primary | ICD-10-CM

## 2025-04-09 PROCEDURE — 99214 OFFICE O/P EST MOD 30 MIN: CPT | Performed by: INTERNAL MEDICINE

## 2025-04-09 ASSESSMENT — COLUMBIA-SUICIDE SEVERITY RATING SCALE - C-SSRS
2. HAVE YOU ACTUALLY HAD ANY THOUGHTS OF KILLING YOURSELF?: NO
6. HAVE YOU EVER DONE ANYTHING, STARTED TO DO ANYTHING, OR PREPARED TO DO ANYTHING TO END YOUR LIFE?: NO
1. IN THE PAST MONTH, HAVE YOU WISHED YOU WERE DEAD OR WISHED YOU COULD GO TO SLEEP AND NOT WAKE UP?: NO

## 2025-04-09 ASSESSMENT — PATIENT HEALTH QUESTIONNAIRE - PHQ9
1. LITTLE INTEREST OR PLEASURE IN DOING THINGS: NOT AT ALL
2. FEELING DOWN, DEPRESSED OR HOPELESS: NOT AT ALL
SUM OF ALL RESPONSES TO PHQ9 QUESTIONS 1 AND 2: 0

## 2025-04-09 ASSESSMENT — PAIN SCALES - GENERAL: PAINLEVEL_OUTOF10: 0-NO PAIN

## 2025-04-09 NOTE — PATIENT INSTRUCTIONS
ASSESSMENT/PLAN:    here for follow up CVHTN - continue the skin care. Continue the compression. Elevate at night. Continue the weight loss. Increase walking as able. Follow up August.

## 2025-04-09 NOTE — PROGRESS NOTES
"OUTPATIENT FOLLOW-UP -  VASCULAR MEDICINE    DOS: 4/9/25  Last seen:    10/9/24    REQUESTING PHYSICIAN:  Dr. Thomas Patino     REASON FOR FOLLOW-UP:  here for follow up CVHTN    HISTORY OF PRESENT ILLNESS:     85 yo lady here for follow up CVHTN. Using the compression - 15-20 mmHg stocking. Reports doing better with the walking. No intervening sores or ulcers. Reports dx psoriatic arthritis. 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       REVIEW OF SYSTEMS:     weight is decreased 6#  No sores, ulcers, rashes, +skin lesions  No CP, chest pressure  No cough,  SOB  +HASKINS  No BRBPR, melena, hematuria  No bleeding  + edema, no calf pain    PHYSICAL EXAMINATION:   /85 (BP Location: Left arm, Patient Position: Sitting)   Pulse 94   Resp 16   Ht 1.727 m (5' 8\")   Wt 107 kg (236 lb)   BMI 35.88 kg/m²     Gen: Appears well, NAD  Ext: 1+ brawny edema at the gaiter area gerard, nontender  Skin: LDS and hemosiderin staining gerard  Pulses: WWP  Mood and affect appropriate    ADDITIONAL DATA:  15-20mmHg compression worn  right calf: 47.0cm  left calf: 45.0cm       ASSESSMENT/PLAN:    here for follow up CVHTN - continue the skin care. Continue the compression. Elevate at night. Continue the weight loss. Increase walking as able. Follow up August.   "

## 2025-04-20 DIAGNOSIS — I48.91 ATRIAL FIBRILLATION, UNSPECIFIED TYPE (MULTI): ICD-10-CM

## 2025-04-20 DIAGNOSIS — I50.32 CHRONIC HEART FAILURE WITH PRESERVED EJECTION FRACTION: ICD-10-CM

## 2025-04-21 RX ORDER — SPIRONOLACTONE 25 MG/1
12.5 TABLET ORAL DAILY
Qty: 15 TABLET | Refills: 0 | Status: SHIPPED | OUTPATIENT
Start: 2025-04-21

## 2025-04-28 DIAGNOSIS — I10 PRIMARY HYPERTENSION: ICD-10-CM

## 2025-04-28 DIAGNOSIS — M1A.9XX1 TOPHACEOUS GOUT: ICD-10-CM

## 2025-04-29 RX ORDER — PREDNISONE 10 MG/1
TABLET ORAL
Qty: 15 TABLET | Refills: 0 | Status: SHIPPED | OUTPATIENT
Start: 2025-04-29

## 2025-04-30 RX ORDER — HYDRALAZINE HYDROCHLORIDE 50 MG/1
50 TABLET, FILM COATED ORAL 2 TIMES DAILY
Qty: 180 TABLET | Refills: 2 | Status: SHIPPED | OUTPATIENT
Start: 2025-04-30

## 2025-05-04 DIAGNOSIS — E11.9 CONTROLLED TYPE 2 DIABETES MELLITUS WITHOUT COMPLICATION, WITHOUT LONG-TERM CURRENT USE OF INSULIN: ICD-10-CM

## 2025-05-05 RX ORDER — GLIMEPIRIDE 1 MG/1
1 TABLET ORAL DAILY
Qty: 90 TABLET | Refills: 3 | Status: SHIPPED | OUTPATIENT
Start: 2025-05-05

## 2025-05-13 LAB
ALBUMIN SERPL-MCNC: 4.5 G/DL (ref 3.6–5.1)
ALP SERPL-CCNC: 66 U/L (ref 37–153)
ALT SERPL-CCNC: 10 U/L (ref 6–29)
ANION GAP SERPL CALCULATED.4IONS-SCNC: 9 MMOL/L (CALC) (ref 7–17)
AST SERPL-CCNC: 12 U/L (ref 10–35)
BILIRUB SERPL-MCNC: 0.8 MG/DL (ref 0.2–1.2)
BUN SERPL-MCNC: 57 MG/DL (ref 7–25)
CALCIUM SERPL-MCNC: 10.4 MG/DL (ref 8.6–10.4)
CHLORIDE SERPL-SCNC: 100 MMOL/L (ref 98–110)
CO2 SERPL-SCNC: 28 MMOL/L (ref 20–32)
CREAT SERPL-MCNC: 2.25 MG/DL (ref 0.6–0.95)
CRP SERPL-MCNC: <3 MG/L
EGFRCR SERPLBLD CKD-EPI 2021: 21 ML/MIN/1.73M2
ERYTHROCYTE [DISTWIDTH] IN BLOOD BY AUTOMATED COUNT: 12.5 % (ref 11–15)
ERYTHROCYTE [SEDIMENTATION RATE] IN BLOOD BY WESTERGREN METHOD: 14 MM/H
GLUCOSE SERPL-MCNC: 141 MG/DL (ref 65–99)
HCT VFR BLD AUTO: 42 % (ref 35–45)
HGB BLD-MCNC: 13.4 G/DL (ref 11.7–15.5)
INR PPP: 2
MCH RBC QN AUTO: 30.6 PG (ref 27–33)
MCHC RBC AUTO-ENTMCNC: 31.9 G/DL (ref 32–36)
MCV RBC AUTO: 95.9 FL (ref 80–100)
PLATELET # BLD AUTO: 212 THOUSAND/UL (ref 140–400)
PMV BLD REES-ECKER: 9.1 FL (ref 7.5–12.5)
POTASSIUM SERPL-SCNC: 5.3 MMOL/L (ref 3.5–5.3)
PROT SERPL-MCNC: 7 G/DL (ref 6.1–8.1)
PROTHROMBIN TIME: 19.8 SEC (ref 9–11.5)
RBC # BLD AUTO: 4.38 MILLION/UL (ref 3.8–5.1)
SODIUM SERPL-SCNC: 137 MMOL/L (ref 135–146)
TSH SERPL-ACNC: 2.11 MIU/L (ref 0.4–4.5)
URATE SERPL-MCNC: 5.1 MG/DL (ref 2.5–7)
WBC # BLD AUTO: 6.9 THOUSAND/UL (ref 3.8–10.8)

## 2025-05-15 ENCOUNTER — APPOINTMENT (OUTPATIENT)
Dept: PRIMARY CARE | Facility: CLINIC | Age: 85
End: 2025-05-15
Payer: MEDICARE

## 2025-05-15 VITALS
RESPIRATION RATE: 14 BRPM | HEART RATE: 91 BPM | OXYGEN SATURATION: 96 % | SYSTOLIC BLOOD PRESSURE: 126 MMHG | HEIGHT: 68 IN | WEIGHT: 227 LBS | DIASTOLIC BLOOD PRESSURE: 80 MMHG | BODY MASS INDEX: 34.4 KG/M2

## 2025-05-15 DIAGNOSIS — I95.1 ORTHOSTASIS: Primary | ICD-10-CM

## 2025-05-15 DIAGNOSIS — I10 PRIMARY HYPERTENSION: ICD-10-CM

## 2025-05-15 DIAGNOSIS — N18.32 CHRONIC KIDNEY DISEASE, STAGE 3B (MULTI): ICD-10-CM

## 2025-05-15 DIAGNOSIS — E11.22 TYPE 2 DIABETES MELLITUS WITH CHRONIC KIDNEY DISEASE, WITHOUT LONG-TERM CURRENT USE OF INSULIN, UNSPECIFIED CKD STAGE (MULTI): ICD-10-CM

## 2025-05-15 LAB — POC HEMOGLOBIN A1C: 6.2 % (ref 4.2–6.5)

## 2025-05-15 RX ORDER — HYDRALAZINE HYDROCHLORIDE 25 MG/1
25 TABLET, FILM COATED ORAL 2 TIMES DAILY
Qty: 180 TABLET | Refills: 3 | Status: SHIPPED | OUTPATIENT
Start: 2025-05-15

## 2025-05-15 NOTE — PATIENT INSTRUCTIONS
I SUSPECT THE HYDRALAZINE IS THE CULPRIT MAKING BP DROP WHEN YOU STAND AS WELL AS A MILDLY WORSE KIDNEY FUNCTION FROM BEFORE.    2.  RECOMMEND DECREASE HYDRALAZINE TO 25 MG TWICE DAILY    3.  A1C = 6.2% = TOO WELL CONTROLLED DIABETES, ITS TOO LOW.  SO RECOMMEND STOP GLIMEPIRIDE 1 MG DAILY NOW    4.  REPEAT BLOOD DRAW FOR KIDNEY FUNCTION IN 2 WEEKS AFTER DECREASING HYDRALAZINE.  TRY TO STAY WELL HYDRATED FOR THE BLOOD TEST NON-FASTING    5.  PHARMACY TO CONTACT YOU ABOUT POSSIBILITY OF A MORE AFFORDABLE GLP-1 RATHER THAN BYDUREON B-CISE THAT YOU ARE TAKING    6.  I SUSPECT THAT REDUCED WEIGHT AND IMPROVED SLEEP APNEA WILL MAKE YOU FEEL LESS FATIGUED     7.  FOLLOW UP 3-4 MONTHS OR AS NEEDED   No formal goals stated

## 2025-05-15 NOTE — PROGRESS NOTES
"Subjective   Promise Perry is a 85 y.o. female who presents for  FOLLOW UP     HPI   NO PROBLEMS TO SPEAK OF    SEEM TO HAVE MORE DIZZINESS WHEN STANDING UP FROM RECLINER    BUT DON'T NOTICE IT WHEN GETTING UP FROM BED.    STAND THERE FOR A FEW SECONDS TO EQUILIBRATE. NOT ASSOCIATED WITH ANY PARTICULAR TIME OF DAY, BUT MORE TOWARD EVENING    NO CHANGE IN MEDS  Review of Systems   Constitutional:  Negative for chills, diaphoresis and fever.   Respiratory:  Negative for cough.    Cardiovascular:  Negative for chest pain and leg swelling.   Gastrointestinal:  Negative for constipation, diarrhea, nausea and vomiting.   Musculoskeletal:  Negative for joint swelling and myalgias.   Neurological:  Positive for dizziness.       Objective   /80   Pulse 91   Resp 14   Ht 1.727 m (5' 8\")   Wt 103 kg (227 lb)   SpO2 96%   BMI 34.52 kg/m²     Physical Exam  Vitals reviewed.   Constitutional:       General: She is not in acute distress.     Appearance: She is obese. She is not ill-appearing.   Cardiovascular:      Rate and Rhythm: Normal rate and regular rhythm.      Pulses: Normal pulses.      Heart sounds:      No gallop.   Pulmonary:      Breath sounds: Normal breath sounds. No wheezing, rhonchi or rales.   Abdominal:      General: Abdomen is flat. Bowel sounds are normal.      Palpations: Abdomen is soft.      Tenderness: There is no guarding or rebound.   Musculoskeletal:      Right lower leg: No edema.      Left lower leg: No edema.   Neurological:      General: No focal deficit present.      Mental Status: She is oriented to person, place, and time.   Psychiatric:         Mood and Affect: Mood normal.         Behavior: Behavior normal.         Assessment/Plan   Problem List Items Addressed This Visit       Hypertension    Relevant Medications    hydrALAZINE (Apresoline) 25 mg tablet    Type 2 diabetes mellitus with chronic kidney disease, without long-term current use of insulin, unspecified CKD stage (Multi) "    Relevant Orders    POCT glycosylated hemoglobin (Hb A1C) manually resulted    Referral to Clinical Pharmacy    Chronic kidney disease, stage 3b (Multi)    Relevant Orders    Basic Metabolic Panel    CBC    Comprehensive Metabolic Panel    Orthostasis - Primary     Patient Instructions    I SUSPECT THE HYDRALAZINE IS THE CULPRIT MAKING BP DROP WHEN YOU STAND AS WELL AS A MILDLY WORSE KIDNEY FUNCTION FROM BEFORE.    2.  RECOMMEND DECREASE HYDRALAZINE TO 25 MG TWICE DAILY    3.  A1C = 6.2% = TOO WELL CONTROLLED DIABETES, ITS TOO LOW.  SO RECOMMEND STOP GLIMEPIRIDE 1 MG DAILY NOW    4.  REPEAT BLOOD DRAW FOR KIDNEY FUNCTION IN 2 WEEKS AFTER DECREASING HYDRALAZINE.  TRY TO STAY WELL HYDRATED FOR THE BLOOD TEST NON-FASTING    5.  PHARMACY TO CONTACT YOU ABOUT POSSIBILITY OF A MORE AFFORDABLE GLP-1 RATHER THAN BYDUREON B-CISE THAT YOU ARE TAKING    6.  I SUSPECT THAT REDUCED WEIGHT AND IMPROVED SLEEP APNEA WILL MAKE YOU FEEL LESS FATIGUED     7.  FOLLOW UP 3-4 MONTHS OR AS NEEDED

## 2025-05-20 ENCOUNTER — OFFICE VISIT (OUTPATIENT)
Dept: CARDIOLOGY | Facility: HOSPITAL | Age: 85
End: 2025-05-20
Payer: MEDICARE

## 2025-05-20 VITALS
WEIGHT: 234.6 LBS | HEIGHT: 68 IN | SYSTOLIC BLOOD PRESSURE: 137 MMHG | BODY MASS INDEX: 35.55 KG/M2 | HEART RATE: 97 BPM | DIASTOLIC BLOOD PRESSURE: 90 MMHG | OXYGEN SATURATION: 96 %

## 2025-05-20 DIAGNOSIS — I50.30 HEART FAILURE WITH PRESERVED EJECTION FRACTION, UNSPECIFIED HF CHRONICITY: Primary | ICD-10-CM

## 2025-05-20 PROCEDURE — 99213 OFFICE O/P EST LOW 20 MIN: CPT

## 2025-05-20 PROCEDURE — 93005 ELECTROCARDIOGRAM TRACING: CPT | Performed by: INTERNAL MEDICINE

## 2025-05-20 ASSESSMENT — ENCOUNTER SYMPTOMS
DEPRESSION: 0
LOSS OF SENSATION IN FEET: 0
OCCASIONAL FEELINGS OF UNSTEADINESS: 0

## 2025-05-20 ASSESSMENT — PAIN SCALES - GENERAL: PAINLEVEL_OUTOF10: 0-NO PAIN

## 2025-05-20 NOTE — PROGRESS NOTES
"Chief Complaint:   No chief complaint on file.     History Of Present Illness:    Promise Perry is a 85 y.o. female here for follow up  she has a PMH significant for HFpEF, COPD , HTN , ANGI on CPAP , chronic Afib ( followed by Dr De La Vega )  along with venous insufficiency ( followed by Dr Rios ) .    Interval Hx:   Currently denies chest pain, shortness of breath. Patient has complaints of palpitations and dyspnea on exertion. Denies orthopnea, PND. Edema noted in BLE. Patient denies headaches or recent falls. Patient does have complaints of dizziness.     Hospitalizations: Denies     Underwent a right and left heart catheterization in August 2024 which did not show any significant CAD and showed normal filling pressures and normal CI     July 2024 ( LHC and RHC )    Diffuse mild calcific disease.   2. Mid LAD lesion that angiographically appears to be 50-60% stenotic, FFR negative.   3. RHC with normal filling pressures with normal CO and CI.    Echocardiogram ( 04/2024)    1. Left ventricular systolic function is normal with a 65% estimated ejection fraction.   2. There is low normal right ventricular systolic function.   3. The left atrium is severely dilated.   4. The right atrium is moderately to severely dilated.   5. Slightly elevated RVSP.   6. Compared with study from 11/8/2022, no significant change.   7. The patient is in atrial fibrillation which may influence the estimate of left ventricular function and transvalvular flows.     Last Recorded Vitals:  Vitals:    05/20/25 1113   BP: 137/90   BP Location: Left arm   Patient Position: Sitting   BP Cuff Size: Adult   Pulse: 97   SpO2: 96%   Weight: 106 kg (234 lb 9.6 oz)   Height: 1.727 m (5' 8\")         Past Medical History:  She has a past medical history of Arthritis, Asthma, CHF (congestive heart failure), Chronic kidney disease, unspecified (07/21/2013), Chronic obstructive pulmonary disease, unspecified (04/16/2018), Chronic obstructive pulmonary " disease, unspecified (06/18/2018), Chronic venous hypertension (idiopathic) with other complications of unspecified lower extremity (04/16/2018), Chronic venous hypertension (idiopathic) with other complications of unspecified lower extremity (06/18/2018), Diabetes mellitus (Multi), Diverticulosis of intestine, part unspecified, without perforation or abscess without bleeding (07/21/2013), Edema, unspecified (04/16/2018), Edema, unspecified (06/18/2018), Essential (primary) hypertension (12/17/2022), Essential (primary) hypertension (07/21/2013), Gastro-esophageal reflux disease without esophagitis (07/21/2013), Gastrointestinal hemorrhage, unspecified (07/21/2013), Hyperlipidemia, unspecified (07/21/2013), Hypothyroidism, unspecified (04/16/2018), Hypothyroidism, unspecified (06/18/2018), Malignant neoplasm of thyroid gland (Multi) (12/16/2022), Obesity, Obstructive sleep apnea (adult) (pediatric) (04/16/2018), Obstructive sleep apnea (adult) (pediatric) (06/18/2018), Other disorders of electrolyte and fluid balance, not elsewhere classified (04/16/2018), Other disorders of electrolyte and fluid balance, not elsewhere classified (06/18/2018), Other fatigue (04/16/2018), Other fatigue (06/18/2018), Other forms of dyspnea (04/16/2018), Other forms of dyspnea (06/18/2018), Pain in unspecified knee (06/18/2018), Personal history of other specified conditions (04/10/2017), Pneumonia, Unspecified abdominal hernia without obstruction or gangrene, Unspecified atrial fibrillation (Multi) (12/16/2022), Unspecified diastolic (congestive) heart failure (10/18/2022), Unspecified osteoarthritis, unspecified site (04/16/2018), and Unspecified osteoarthritis, unspecified site (06/18/2018).    Past Surgical History:  She has a past surgical history that includes Incisional breast biopsy (09/25/2013); Total thyroidectomy (09/25/2013); Esophagogastroduodenoscopy (08/28/2014); Colonoscopy (08/28/2014); Cataract extraction  (03/12/2018); CT angio coronary art with heartflow if score >30% (08/15/2017); Joint replacement; Cardiac catheterization (N/A, 7/26/2024); and Cardiac catheterization (N/A, 7/26/2024).      Social History:  She reports that she quit smoking about 59 years ago. Her smoking use included cigarettes. She started smoking about 68 years ago. She has a 5 pack-year smoking history. She has never used smokeless tobacco. She reports current alcohol use of about 2.0 standard drinks of alcohol per week. She reports that she does not currently use drugs.    Family History:  Family History   Problem Relation Name Age of Onset    Hypertension Mother Esther Perry     Heart disease Mother Esther Perry     Cancer Father Pankaj Perry         Allergies:  Ceclor [cefaclor], Codeine, Lisinopril, Losartan, Quinidine, Allopurinol, and Erythromycin    Outpatient Medications:  Current Outpatient Medications   Medication Instructions    acetaminophen (TYLENOL) 1,000 mg, Every 6 hours PRN    atorvastatin (LIPITOR) 10 mg, oral, Daily    blood sugar diagnostic (OneTouch Verio test strips) strip 1 strip, subcutaneous, Daily RT    Bydureon BCise 2 mg, subcutaneous, Once Weekly, Inject contents of (1) pen subcutaneously once a week as directed.    celecoxib (CELEBREX) 200 mg, oral, Daily    cholecalciferol (VITAMIN D-3) 50 mcg, Daily    citalopram (CELEXA) 20 mg, oral, Daily    cyanocobalamin (VITAMIN B-12) 500 mcg, Daily    febuxostat (ULORIC) 80 mg, oral, Daily    fluticasone (Flonase) 50 mcg/actuation nasal spray 2 sprays, Each Nostril, Daily, Shake gently. Before first use, prime pump. After use, clean tip and replace cap.    hydrALAZINE (APRESOLINE) 25 mg, oral, 2 times daily    leflunomide (ARAVA) 10 mg, oral, Daily    levothyroxine (Synthroid, Levoxyl) 150 mcg tablet TAKE 1 TABLET BY MOUTH DAILY FOR 6 DAYS THEN ONE-HALF TABLET BY  MOUTH ON 7TH DAY    metFORMIN XR (GLUCOPHAGE-XR) 1,500 mg, oral, Daily    metoprolol succinate XL  (TOPROL-XL) 75 mg, oral, Daily, Take 1 and 1/2 (one and one-half) tablets by mouth daily.    predniSONE (Deltasone) 10 mg tablet TAKE 3 TABLETS BY MOUTH ONCE  DAILY FOR 5 DAYS    spironolactone (ALDACTONE) 12.5 mg, oral, Daily    torsemide (DEMADEX) 20 mg, oral, Every other day    verapamil SR (CALAN-SR) 240 mg, oral, Daily    warfarin (Coumadin) 5 mg tablet TAKE 1 TABLET BY MOUTH MONDAY  THROUGH FRIDAY AND ONE-HALF  TABLET BY MOUTH ON SATURDAYS       Physical Exam:  GEN: NAD , AOX3  HEENT : JVP at the clavicle   Heart : irregular , no murmurs   Lungs : clear , resonant , normal air entry bilaterally   Abdomen : soft , non tender   Ext: + varicose veins and venous insufficiency skin changes along with swelling of the joints in the fingers bilaterally   Neuro : grossly intact    Skin: has non blanching pink lesions on her abdomen and her breasts      Last Labs:  CBC -  Lab Results   Component Value Date    WBC 6.9 05/12/2025    HGB 13.4 05/12/2025    HCT 42.0 05/12/2025    MCV 95.9 05/12/2025     05/12/2025       CMP -  Lab Results   Component Value Date    CALCIUM 10.4 05/12/2025    PHOS 3.6 11/07/2024    PROT 7.0 05/12/2025    ALBUMIN 4.5 05/12/2025    AST 12 05/12/2025    ALT 10 05/12/2025    ALKPHOS 66 05/12/2025    BILITOT 0.8 05/12/2025       LIPID PANEL -   Lab Results   Component Value Date    CHOL 146 02/05/2025    TRIG 130 02/05/2025    HDL 44 (L) 02/05/2025    CHHDL 3.3 02/05/2025    LDLF 65 05/30/2023    VLDL 23 05/30/2023    NHDL 102 02/05/2025       RENAL FUNCTION PANEL -   Lab Results   Component Value Date    GLUCOSE 141 (H) 05/12/2025     05/12/2025    K 5.3 05/12/2025     05/12/2025    CO2 28 05/12/2025    ANIONGAP 9 05/12/2025    BUN 57 (H) 05/12/2025    CREATININE 2.25 (H) 05/12/2025    CALCIUM 10.4 05/12/2025    PHOS 3.6 11/07/2024    ALBUMIN 4.5 05/12/2025        Lab Results   Component Value Date     (H) 07/01/2024    HGBA1C 6.2 05/15/2025           Assessment/Plan    Promise Perry is a 85 y.o. female here for follow up  she has a PMH significant for HFpEF, COPD , HTN , ANGI on CPAP , chronic Afib ( followed by Dr De La Vega )  along with venous insufficiency ( followed by Dr Rios ) .     #HFpEF   Most recent RHC showed normal filling pressures on current diuretic regimen , will maintain her on torsemide 20 mg daily   She was unsure what medications she is taking in terms of spironolactone and the frequency of her loop diuretics . Advised her to call us when she gets home and review the list   Will also repeat a renal function panel and BNP     #Atrial fibrillation   Followed by Dr De La Vega , on verapamil 240 mg daily and Toprol XL 75 mg daily   On coumadin for anticoagulation     #Joint discomfort   ? Rheumatoid arthritis   Referral to rheumatology   Obtain basic labs , ESR , CRP , BNP , BOO , CBC     #Venous insufficiency   Follows with Dr Rios     #CAD   Has Mid LAD 50-60% stenosis ,negative FFR   On simvastatin   LDL at goal     Follow up in 6 months

## 2025-05-20 NOTE — PATIENT INSTRUCTIONS
To reach Dr. Nielsen's office please call [phone] ([place]). Fax [phone]. Call [phone] to schedule an appointment. You may also contact the HF RNs at [email or web address]    Thank you for coming to your appointment today. If you have any questions or need cardiac medication refills, please call the Heart Failure Office at [phone] option [phone].     DECREASE Torsemide to 20mg twice a week (on Mondays and Fridays)  DECREASE Sprionolactone to 12.5mg twice a week (on Mondays and Fridays)   Follow up with Dr. Nielsen in 4-6 weeks. Please have labs drawn a few days before you see her.

## 2025-05-21 ENCOUNTER — APPOINTMENT (OUTPATIENT)
Dept: PRIMARY CARE | Facility: CLINIC | Age: 85
End: 2025-05-21
Payer: MEDICARE

## 2025-05-21 DIAGNOSIS — Z00.6 RESEARCH STUDY PATIENT: ICD-10-CM

## 2025-05-22 LAB
ATRIAL RATE: 375 BPM
Q ONSET: 225 MS
QRS COUNT: 17 BEATS
QRS DURATION: 92 MS
QT INTERVAL: 358 MS
QTC CALCULATION(BAZETT): 475 MS
QTC FREDERICIA: 432 MS
R AXIS: 95 DEGREES
T AXIS: -33 DEGREES
T OFFSET: 404 MS
VENTRICULAR RATE: 106 BPM

## 2025-06-01 PROBLEM — I95.1 ORTHOSTASIS: Status: ACTIVE | Noted: 2025-06-01

## 2025-06-01 ASSESSMENT — ENCOUNTER SYMPTOMS
FEVER: 0
VOMITING: 0
DIARRHEA: 0
NAUSEA: 0
DIAPHORESIS: 0
CONSTIPATION: 0
CHILLS: 0
JOINT SWELLING: 0
DIZZINESS: 1
COUGH: 0
MYALGIAS: 0

## 2025-06-10 ENCOUNTER — TELEPHONE (OUTPATIENT)
Dept: PRIMARY CARE | Facility: CLINIC | Age: 85
End: 2025-06-10
Payer: MEDICARE

## 2025-06-10 ENCOUNTER — LAB (OUTPATIENT)
Dept: LAB | Facility: HOSPITAL | Age: 85
End: 2025-06-10
Payer: MEDICARE

## 2025-06-10 DIAGNOSIS — Z00.6 ENCOUNTER FOR EXAMINATION FOR NORMAL COMPARISON AND CONTROL IN CLINICAL RESEARCH PROGRAM: Primary | ICD-10-CM

## 2025-06-10 LAB
CREAT SERPL-MCNC: 1.96 MG/DL (ref 0.5–1.05)
EGFRCR SERPLBLD CKD-EPI 2021: 25 ML/MIN/1.73M*2

## 2025-06-10 PROCEDURE — 84550 ASSAY OF BLOOD/URIC ACID: CPT

## 2025-06-10 PROCEDURE — 82565 ASSAY OF CREATININE: CPT

## 2025-06-10 NOTE — TELEPHONE ENCOUNTER
AARON.... Pt advised that she was at [facility] for a FBSE and they found a tick.  She wanted you to know that they have her on Doxycycline 100mg for 3 weeks.

## 2025-06-12 ENCOUNTER — APPOINTMENT (OUTPATIENT)
Dept: PHARMACY | Facility: HOSPITAL | Age: 85
End: 2025-06-12
Payer: MEDICARE

## 2025-06-12 DIAGNOSIS — E11.22 TYPE 2 DIABETES MELLITUS WITH CHRONIC KIDNEY DISEASE, WITHOUT LONG-TERM CURRENT USE OF INSULIN, UNSPECIFIED CKD STAGE (MULTI): ICD-10-CM

## 2025-06-12 RX ORDER — TIRZEPATIDE 5 MG/.5ML
5 INJECTION, SOLUTION SUBCUTANEOUS WEEKLY
Qty: 2 ML | Refills: 2 | Status: SHIPPED | OUTPATIENT
Start: 2025-06-12

## 2025-06-12 NOTE — ASSESSMENT & PLAN NOTE
Patient's A1c is 6.2% on 5/15/25. Goal A1c <7.5% reasonable due to age and comorbidities.     Rationale for plan: Patient interested in discussing benefits of alternative GLPs to current Bydureon and cost comparison. Recommended Mounjaro and discussed metabolic and cardiovascular benefits, as well as improved glycemic efficacy. Reviewed coverage and determined patient copays for all GLPs are the same. Patient states she would not qualify for cost assistance. She is agreeable to switch Bydureon to Mounjaro, will switch to approximately equivalent dose of 5mg. Patient declines further pharmacist involvement at this time. Strongly recommended discontinuing metformin due to eGFR<30 and following up with adjustment of Mounjaro for glucose control. Patient would prefer to discuss with PCP, notified.     Medication Changes:  START  Mounjaro 5mg once weekly  STOP  Bydureon  RECOMMEND DISCONTINUATION  Metformin

## 2025-06-12 NOTE — PROGRESS NOTES
Clinical Pharmacy Appointment    Patient ID: Promise Perry is a 85 y.o. female who presents for Diabetes.    Pt is here for First appointment.  Referring Provider: Thomas Patino, * - last visit: 5/15/25, next visit: 8/11/25  PCP: Thomas Patino MD     Subjective   HPI  PMH significant for T2DM, CKD, obesity, HFpEF, afib, HTN, PVD, COPD, hypothyroid, arthritis s/p left TKA, gout, ANGI.  Special needs/barriers to therapy: None identified    Medication Reconciliation:  No changes    Drug Interactions  No relevant drug interactions were noted.    Medication System Management  Adherence/Organization: No current concerns  Affordability/Accessibility: No current concerns  Patient's preferred pharmacy:     [facility] [address]  [address]  [address] [address]  Phone: [phone] Fax: [phone]    [id number] [id number] [facility] & [place] [id number] [facility] [address]  Phone: [phone] Fax: [phone]    [id number] [address] [facility] AT [facility] [address]  [address]  [address] [address], located within [facility] [address]  Phone: [phone] Fax: [phone]       DIABETES MELLITUS Type 2:    Follows with Endocrinology?: No    Pertinent PMH Review  Known diabetic complications:   Chronic Kidney Disease  Peripheral vascular disease  Obesity  Hx or FH Hx of MTC/MEN2?: No  Pancreatitis?: No  Gastroparesis?: No  UTI/Yeast Infections?: Yes, Hx recurrent UTIs    Pharmacological Therapy  Current Medications:   Bydureon Bcise 2mg weekly  Metformin XR 500mg 3 tabs daily  Previous Medications: Humalog, repaglinide, glimepiride    Clarifications to above regimen: None  Adverse Effects: None    Glucose Monitoring  Glucometer/CGM Type: Not reviewed    Previous home BG readings: N/A  Current home BG readings: Not  reviewed     Any episodes of hypoglycemia? No   Did patient treat episode of hypoglycemia appropriately? N/A  Does the patient have a prescription for ready-to-use Glucagon? Not Indicated    Lifestyle - Not reviewed    Risk-Reducing Medications  Statin? Yes  ACE-I/ARB? Not at this time    Preventative Care  Diabetic Eye Exam: Following with ophthalmology  Monofilament Foot Exam: Following with podiatry  uACR in past year? No, indicated for annual monitoring  Immunizations Needed: All up-to-date and documented  Tobacco Use: former smoker, quit 1965       Objective   Allergies[1]  Social History     Social History Narrative    Not on file      Medication Review  Current Outpatient Medications   Medication Instructions    acetaminophen (TYLENOL) 1,000 mg, Every 6 hours PRN    atorvastatin (LIPITOR) 10 mg, oral, Daily    blood sugar diagnostic (OneTouch Verio test strips) strip 1 strip, subcutaneous, Daily RT    Bydureon BCise 2 mg, subcutaneous, Once Weekly, Inject contents of (1) pen subcutaneously once a week as directed.    celecoxib (CELEBREX) 200 mg, oral, Daily    cholecalciferol (VITAMIN D-3) 50 mcg, Daily    citalopram (CELEXA) 20 mg, oral, Daily    cyanocobalamin (VITAMIN B-12) 500 mcg, Daily    febuxostat (ULORIC) 80 mg, oral, Daily    fluticasone (Flonase) 50 mcg/actuation nasal spray 2 sprays, Each Nostril, Daily, Shake gently. Before first use, prime pump. After use, clean tip and replace cap.    hydrALAZINE (APRESOLINE) 25 mg, oral, 2 times daily    leflunomide (ARAVA) 10 mg, oral, Daily    levothyroxine (Synthroid, Levoxyl) 150 mcg tablet TAKE 1 TABLET BY MOUTH DAILY FOR 6 DAYS THEN ONE-HALF TABLET BY  MOUTH ON 7TH DAY    metFORMIN XR (GLUCOPHAGE-XR) 1,500 mg, oral, Daily    metoprolol succinate XL (TOPROL-XL) 75 mg, oral, Daily, Take 1 and 1/2 (one and one-half) tablets by mouth daily.    predniSONE (Deltasone) 10 mg tablet TAKE 3 TABLETS BY MOUTH ONCE  DAILY FOR 5 DAYS    spironolactone (ALDACTONE)  "12.5 mg, oral, Daily    torsemide (DEMADEX) 20 mg, oral, Every other day    verapamil SR (CALAN-SR) 240 mg, oral, Daily    warfarin (Coumadin) 5 mg tablet TAKE 1 TABLET BY MOUTH MONDAY  THROUGH FRIDAY AND ONE-HALF  TABLET BY MOUTH ON SATURDAYS      Vitals  BP Readings from Last 2 Encounters:   05/20/25 137/90   05/15/25 126/80     Wt Readings from Last 3 Encounters:   05/20/25 106 kg (234 lb 9.6 oz)   05/15/25 103 kg (227 lb)   04/09/25 107 kg (236 lb)      There is no height or weight on file to calculate BMI.  Labs  A1C  Lab Results   Component Value Date    HGBA1C 6.2 05/15/2025    HGBA1C 7.7 (H) 02/05/2025    HGBA1C 6.6 (A) 11/12/2024     Metabolic Panel  Lab Results   Component Value Date    EGFR 25 (L) 06/10/2025    CREATININE 1.96 (H) 06/10/2025     05/12/2025    K 5.3 05/12/2025    CALCIUM 10.4 05/12/2025     05/12/2025    CO2 28 05/12/2025    BUN 57 (H) 05/12/2025     Liver function  Lab Results   Component Value Date    ALT 10 05/12/2025    AST 12 05/12/2025    ALKPHOS 66 05/12/2025    BILITOT 0.8 05/12/2025     Lipid Panel  Lab Results   Component Value Date    CHOL 146 02/05/2025    LDLCALC 79 02/05/2025    TRIG 130 02/05/2025    HDL 44 (L) 02/05/2025     Urine Microalbumin  No results found for: \"MICROALBCREA\"    Assessment/Plan   Problem List Items Addressed This Visit       Type 2 diabetes mellitus with chronic kidney disease, without long-term current use of insulin, unspecified CKD stage (Multi)    Patient's A1c is 6.2% on 5/15/25. Goal A1c <7.5% reasonable due to age and comorbidities.     Rationale for plan: Patient interested in discussing benefits of alternative GLPs to current Bydureon and cost comparison. Recommended Mounjaro and discussed metabolic and cardiovascular benefits, as well as improved glycemic efficacy. Reviewed coverage and determined patient copays for all GLPs are the same. Patient states she would not qualify for cost assistance. She is agreeable to switch Bydureon " to Mounjaro, will switch to approximately equivalent dose of 5mg. Patient declines further pharmacist involvement at this time. Strongly recommended discontinuing metformin due to eGFR<30 and following up with adjustment of Mounjaro for glucose control. Patient would prefer to discuss with PCP, notified.     Medication Changes:  START  Mounjaro 5mg once weekly  STOP  Bydureon  RECOMMEND DISCONTINUATION  Metformin           Patient Education:  Counseled patient on relevant medication mechanisms of action, expectations, side effects, duration of therapy, contraindications, administration, and monitoring parameters.  All questions and concerns addressed. Contact pharmacist with any further questions or concerns prior to next appointment.    Clinical Pharmacist follow-up: Patient declines    Thank you,  Laney Molina, [facility]  Clinical Pharmacy Specialist  [phone]    Continue all meds under the continuation of care with the referring provider and clinical pharmacy team.  Verbal consent to manage patient's drug therapy was obtained from the patient. They were informed they may decline to participate or withdraw from participation in pharmacy services at any time.         [1]   Allergies  Allergen Reactions    Ceclor [Cefaclor] Unknown    Codeine Nausea Only    Lisinopril Other     severve hypotension    Losartan Unknown    Quinidine Unknown    Allopurinol Rash    Erythromycin Rash

## 2025-06-12 NOTE — Clinical Note
Patient declined pharmacy follow-up, but agreed to switch Bydureon to Mounjaro. I also recommended discontinuing metformin due to eGFR<30, but patient would like to discuss with you first. Thank you!

## 2025-06-13 ENCOUNTER — APPOINTMENT (OUTPATIENT)
Facility: CLINIC | Age: 85
End: 2025-06-13
Payer: MEDICARE

## 2025-06-13 ENCOUNTER — TELEPHONE (OUTPATIENT)
Dept: NEPHROLOGY | Facility: HOSPITAL | Age: 85
End: 2025-06-13

## 2025-06-13 VITALS
TEMPERATURE: 96.6 F | HEART RATE: 87 BPM | BODY MASS INDEX: 35.01 KG/M2 | HEIGHT: 68 IN | SYSTOLIC BLOOD PRESSURE: 139 MMHG | DIASTOLIC BLOOD PRESSURE: 85 MMHG | WEIGHT: 231 LBS

## 2025-06-13 DIAGNOSIS — M1A.9XX1 TOPHACEOUS GOUT: Primary | ICD-10-CM

## 2025-06-13 LAB — URATE SERPL-MCNC: 4.8 MG/DL (ref 2.3–6.7)

## 2025-06-13 RX ORDER — DOXYCYCLINE 100 MG/1
100 TABLET ORAL 2 TIMES DAILY
COMMUNITY

## 2025-06-13 NOTE — PATIENT INSTRUCTIONS
I would recommend not taking  celebrex, take tylenol extra strength 500 mg up to 3 times a day    Continue Feboxustat 80 mg daily  And leflunomide 10 mg daily    Follow up x 3-4 months

## 2025-06-13 NOTE — PROGRESS NOTES
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    Currently  Febuxostat 80 mg daily  Leflunomide 10 mg daily ( prescribed but patient did not start)    Did not tolerate colchicine      Interval history 6.13.2025  Doing a lot better, no flares  Swelling has immproved a lot  But has issues with making a full fist from her right hand    She still takes celebrex on daily basis for generalized body ache      RECALL  HTN, DM, HFpEF, Afib   On anticoagulation  On oral hypoglycemics  On Diuretics  Has been having episodic swelling of her MCPs and PIPs in the last year or so  Previously had an attack of acute painful episode in the foot  Was put on colchicine and allopurinol by the PCP   She developed hives from it and it was stopped subsequently.  For the last 4-6 months she has had difficulty making a fist with persistent swelling of the bilateral knuckles  Feels stiff throughout the day , and has pain in the knuckles  Difficulty grasping or holding objects  Has HFpEF , recent angio without CAD  On diuretics  Enjoys sea food occasionally  Does not consume sugary stuff anymore  Has early stage CKD  For her pains she was taking celebrex daily    Interval history 12.2024  Virtual visit established for hand xrays showing concerning changes of peripheral SpA  Patient feels better on prednisone prescribed last visit  Stiffness and swelling is better  Denies any history of psoriasis ( reconfirmed from last visit)  No family hx of psoriasis as well  No axSpA complaints         Allergies   Allergen Reactions    Ceclor [Cefaclor] Unknown    Codeine Nausea Only    Lisinopril Other     severve hypotension    Losartan Unknown    Quinidine Unknown    Allopurinol Rash    Erythromycin Rash          Current Outpatient Medications:     acetaminophen  (Tylenol) 325 mg tablet, Take 1,000 mg by mouth every 6 hours if needed., Disp: , Rfl:     atorvastatin (Lipitor) 10 mg tablet, Take 1 tablet (10 mg) by mouth once daily., Disp: 30 tablet, Rfl: 11    blood sugar diagnostic (OneTouch Verio test strips) strip, Inject 1 strip under the skin once daily., Disp: 100 strip, Rfl: 3    celecoxib (CeleBREX) 200 mg capsule, TAKE 1 CAPSULE BY MOUTH ONCE  DAILY, Disp: 90 capsule, Rfl: 3    cholecalciferol (Vitamin D-3) 50 MCG (2000 UT) tablet, Take 1 tablet (50 mcg) by mouth once daily., Disp: , Rfl:     citalopram (CeleXA) 20 mg tablet, TAKE 1 TABLET BY MOUTH ONCE  DAILY, Disp: 90 tablet, Rfl: 3    cyanocobalamin (Vitamin B-12) 500 mcg tablet, Take 1 tablet (500 mcg) by mouth once daily., Disp: , Rfl:     febuxostat (Uloric) 40 mg tablet, Take 2 tablets (80 mg) by mouth once daily., Disp: 60 tablet, Rfl: 5    fluticasone (Flonase) 50 mcg/actuation nasal spray, Administer 2 sprays into each nostril once daily. Shake gently. Before first use, prime pump. After use, clean tip and replace cap., Disp: 16 g, Rfl: 2    hydrALAZINE (Apresoline) 25 mg tablet, Take 1 tablet (25 mg) by mouth 2 times a day., Disp: 180 tablet, Rfl: 3    leflunomide (Arava) 10 mg tablet, Take 1 tablet (10 mg) by mouth once daily., Disp: 90 tablet, Rfl: 3    levothyroxine (Synthroid, Levoxyl) 150 mcg tablet, TAKE 1 TABLET BY MOUTH DAILY FOR 6 DAYS THEN ONE-HALF TABLET BY  MOUTH ON 7TH DAY, Disp: 84 tablet, Rfl: 3    metFORMIN  mg 24 hr tablet, Take 3 tablets (1,500 mg) by mouth once daily., Disp: , Rfl:     metoprolol succinate XL (Toprol-XL) 50 mg 24 hr tablet, TAKE 1 AND 1/2 TABLETS BY MOUTH  DAILY, Disp: 135 tablet, Rfl: 3    predniSONE (Deltasone) 10 mg tablet, TAKE 3 TABLETS BY MOUTH ONCE  DAILY FOR 5 DAYS (Patient taking differently: PRN), Disp: 15 tablet, Rfl: 0    spironolactone (Aldactone) 25 mg tablet, TAKE ONE-HALF TABLET BY MOUTH  ONCE DAILY, Disp: 15 tablet, Rfl: 0    tirzepatide (Mounjaro)  5 mg/0.5 mL pen injector, Inject 5 mg under the skin 1 (one) time per week., Disp: 2 mL, Rfl: 2    torsemide (Demadex) 20 mg tablet, Take 1 tablet (20 mg) by mouth every other day., Disp: 45 tablet, Rfl: 3    verapamil SR (Calan-SR) 240 mg ER tablet, Take 1 tablet (240 mg) by mouth once daily., Disp: 90 tablet, Rfl: 3    warfarin (Coumadin) 5 mg tablet, TAKE 1 TABLET BY MOUTH MONDAY  THROUGH FRIDAY AND ONE-HALF  TABLET BY MOUTH ON SATURDAYS, Disp: 84 tablet, Rfl: 2       Objective     Visit Vitals  /85   Pulse 87   Temp 35.9 °C (96.6 °F)          Physical Exam  Bilateral synovitis of 2nd and 3rd MCP  With tophaceous nodule and dactylitis appearance of the 3rd finger  Previous visit  Point of care ultrasound confirmed the two round tophi on the Right 3rd PIP  And double contour sign in the rt 2nd MCP, rt 3rd MCP   And left 2nd MCP       3.2025  Has synovial thickening 4th PIP and synovial thickening of the 3rd MCP b/l  Visible tophi over the PIP    6.13.2025  Synovial thickening of left 4th PIP  with small tophus  Bony hypertrophy and synovial thickening of the 3+4 PIP bilaterally  Non tender small hand joints on exam today    Bilateral pitting edema       Lab Results   Component Value Date    WBC 6.9 05/12/2025    HGB 13.4 05/12/2025    HCT 42.0 05/12/2025    MCV 95.9 05/12/2025     05/12/2025        Chemistry    Lab Results   Component Value Date/Time     05/12/2025 1109    K 5.3 05/12/2025 1109     05/12/2025 1109    CO2 28 05/12/2025 1109    BUN 57 (H) 05/12/2025 1109    CREATININE 1.96 (H) 06/10/2025 1442    CREATININE 2.25 (H) 05/12/2025 1109    Lab Results   Component Value Date/Time    CALCIUM 10.4 05/12/2025 1109    ALKPHOS 66 05/12/2025 1109    AST 12 05/12/2025 1109    ALT 10 05/12/2025 1109    BILITOT 0.8 05/12/2025 1109           Lab Results   Component Value Date    CRP <3.0 05/12/2025      Lab Results   Component Value Date    BOO Negative 12/03/2024    RF <10 11/07/2024     "SEDRATE 14 05/12/2025      No results found for: \"CKTOTAL\"  Lab Results   Component Value Date    NEUTROABS 6.94 (H) 08/17/2023      No results found for: \"FERRITIN\"   No results found for: \"HEPATOT\", \"HEPAIGM\", \"HEPBCIGM\", \"HEPBCAB\", \"HBEAG\", \"HEPCAB\"   Lab Results   Component Value Date    ALT 10 05/12/2025    AST 12 05/12/2025    ALKPHOS 66 05/12/2025    BILITOT 0.8 05/12/2025      No results found for: \"PPD\"   Lab Results   Component Value Date    URICACID 5.1 05/12/2025      Lab Results   Component Value Date    CALCIUM 10.4 05/12/2025    PHOS 3.6 11/07/2024      No results found for: \"SPEP\", \"UPEP\"   No results found for: \"ALBUR\", \"[id number]\"   .last          ECG 12 Lead  Atrial fibrillation with rapid ventricular response  Rightward axis  Incomplete right bundle branch block  Anteroseptal infarct , age undetermined  Abnormal ECG  No previous ECGs available  Confirmed by Torey Michael ([id number]) on 6/5/2025 11:09:10 AM     === 09/07/23 ===    KNEE    - Impression -  1. Left total knee arthroplasty without hardware complication.   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    Nonspecific dorsal subcutaneous soft tissue edema and enhancement  which could be cellulitis but is nonspecific.    Attention to the 5th digit demonstrates no specific abnormality.   === 11/13/23 ===    DEXA BONE DENSITY    - Impression -  DEXA:  According to World Health Organization criteria,  classification is normal.    Followup recommended in 2 years or sooner as clinically warranted.    All images and detailed analysis are available on the  Radiology  PACS.    MACRO:  None    Signed by: Yvan Mcgrath 11/16/2023 8:31 AM  Dictation workstation:   [id number]       Assessment/Plan   Diagnoses and all orders for this visit:    Tophaceous gout  Now chronic gouty arthropathy with persistent synovitis  And confirmed tophi and double contour sign today on " ultrasound  Patient did not tolerate allopurinol in the past had a CADR  Although she has HFpEF, but has no CAD, therefore she is not at a higher risk for cardiovascular complications that has been a cause of concern with febuxostat; thus she was started on Uloric Dec  UA has been high in the mid 8s and last reading 6.5  Target for tophaceous gout is below 5mg/dl  UA level now   6/2025 4.8 mg/dl  5/25       5.1  3/2025   5.5  12/2024 10.3    She was strongly advised today as well not to use celecoxib because of her heart condition and CKD and I have discontinued this medication for her  And to use tylenol extra strength 2 tabs TID PRN    #Peripheral SpA  With characteristic changes in the PIP with erosions and near pencil in cup deformity of the   3rd and 4th PIP with osteoproliferation ( US was not performed for the PIP joints)   Changes are highly suggestive of PsA  Does not have history of psoriasis, no family members with psoriasis  She cannot be on methotrexate given her CKD    Discussed leflunomide 10 mg is a low dose and she does not have any underlying liver problem  She afreed to take it and will see response on follow up    Follow up x 3mo     Naveed De Jesus MD FACR   of Medicine  [facility] - Department of Rheumatology  [facility]   Plan, including risks and benefits, was discussed with the patient, informed on how to reach us.     To schedule an appointment, call  [phone] Shirin or call [phone] for [facility]

## 2025-06-14 NOTE — PROGRESS NOTES
Called yvette today to conduct her [facility] Phase 5 Study Visit 2 by telephone.  She had gone to [facility] on 06/10/25 to have a final S. Creatinine drawn per study protocol.  Informed ppt of results.  Updated ppt's contact and HCP information during the call.  CMEDS were reconciled.  MedHX and EVENTS questionnaires were administered.  Yvette had no ED visits or hospitalizations since her last [facility] visit.  She did undergo an outpt. cardiac catheterization in July 2024.  Ppt stated she has signed and mailed back the Authorization for Medical Release of Information form as well as the [facility] remittance form we had sent her.  I informed her that she will be receiving monetary compensation for having her blood drawn for the study.  A check from [facility] should arrive in a few weeks.

## 2025-06-15 DIAGNOSIS — N18.32 CHRONIC KIDNEY DISEASE, STAGE 3B (MULTI): ICD-10-CM

## 2025-06-18 ENCOUNTER — TELEPHONE (OUTPATIENT)
Dept: PRIMARY CARE | Facility: CLINIC | Age: 85
End: 2025-06-18
Payer: MEDICARE

## 2025-06-18 NOTE — TELEPHONE ENCOUNTER
Pt spoke with pharmacy and has agreed to start Mounjaro.  They advised her to stop the metformin.    She would like to know if you agree to this?

## 2025-06-23 DIAGNOSIS — I50.32 CHRONIC HEART FAILURE WITH PRESERVED EJECTION FRACTION: ICD-10-CM

## 2025-06-23 DIAGNOSIS — I48.91 ATRIAL FIBRILLATION, UNSPECIFIED TYPE (MULTI): ICD-10-CM

## 2025-06-23 RX ORDER — SPIRONOLACTONE 25 MG/1
12.5 TABLET ORAL DAILY
Qty: 15 TABLET | Refills: 11 | Status: SHIPPED | OUTPATIENT
Start: 2025-06-23

## 2025-06-29 DIAGNOSIS — I50.30 HEART FAILURE WITH PRESERVED EJECTION FRACTION, UNSPECIFIED HF CHRONICITY: ICD-10-CM

## 2025-06-29 DIAGNOSIS — I48.91 ATRIAL FIBRILLATION, UNSPECIFIED TYPE (MULTI): ICD-10-CM

## 2025-06-30 ENCOUNTER — APPOINTMENT (OUTPATIENT)
Dept: GASTROENTEROLOGY | Facility: CLINIC | Age: 85
End: 2025-06-30
Payer: MEDICARE

## 2025-06-30 VITALS
RESPIRATION RATE: 16 BRPM | WEIGHT: 225 LBS | DIASTOLIC BLOOD PRESSURE: 61 MMHG | OXYGEN SATURATION: 96 % | SYSTOLIC BLOOD PRESSURE: 94 MMHG | HEIGHT: 68 IN | HEART RATE: 80 BPM | BODY MASS INDEX: 34.1 KG/M2

## 2025-06-30 DIAGNOSIS — K59.00 CONSTIPATION, UNSPECIFIED CONSTIPATION TYPE: ICD-10-CM

## 2025-06-30 DIAGNOSIS — K64.8 INTERNAL HEMORRHOIDS: Primary | ICD-10-CM

## 2025-06-30 DIAGNOSIS — R19.8 STRAINING DURING BOWEL MOVEMENTS: ICD-10-CM

## 2025-06-30 RX ORDER — VERAPAMIL HCL 240 MG
240 TABLET, EXTENDED RELEASE ORAL DAILY
Qty: 90 TABLET | Refills: 3 | Status: SHIPPED | OUTPATIENT
Start: 2025-06-30

## 2025-06-30 RX ORDER — HYDROCORTISONE 25 MG/G
CREAM TOPICAL 2 TIMES DAILY
Qty: 28 G | Refills: 0 | Status: SHIPPED | OUTPATIENT
Start: 2025-06-30

## 2025-06-30 RX ORDER — WARFARIN SODIUM 5 MG/1
TABLET ORAL
Qty: 71 TABLET | Refills: 3 | Status: SHIPPED | OUTPATIENT
Start: 2025-06-30

## 2025-06-30 NOTE — PATIENT INSTRUCTIONS
Daily bowel regimen:  -MiraLAX 1 capful daily in 8 ounces of any liquid.  You can increase or decrease the dose as needed the goal is to have a daily BM and feel fully evacuated.  -Metamucil 1 teaspoon daily in 8 ounces of water  -Footstool or squatty potty during bowel movements  -Adequate water throughout the day  -Walk 15 to 30 minutes daily to help promote GI motility

## 2025-06-30 NOTE — PROGRESS NOTES
"Subjective   Patient ID: Promise Perry \"Yaneli\" is a 85 y.o. female who presents for Hemorrhoids (Believes she has both internal and external bleeding hemorrhoids. At her last appointment, she was experiencing diarrhea but Metamucil helped her. She states now she is experiencing some constipation. She states she has been straining. She did have an episode where she had to use her fingers to get stool out. She stopped taking Metamucil thinking this was the problem but nothing has changed. She c/o rectal pressure. Using witch hazel. Last colonoscopy in 2018.).  HPI  LOV with Sofía Cruzmic NP 5/2022:  83 yo female with a history of diabetes, morbid obesity, atrial fibrillation, COPD, heart failure. Here for follow-up after a recent CT scan and hydrogen breath test which were done for abdominal pain, bloating and alternating diarrhea and constipation. The CT ABD - negative. The HBT - negative.     Patient just started using Sugar - Free Metamucil recently. She moves her bowels every 2 to 3 days. She reports blood in the stool and in the toilet a few times a week. She takes NSAIDs - Celebrex and is on chronic anticoagulation -Warfarin.     On exam there are large non-thrombosed external hemorrhoids and possible prolapsed rectal tissue, hard stool palpated in the rectum.   > Colonoscopy- 2018 - Dr Espino -sigmoid diverticulosis, 1 polyp removed in the descending colon, hyperplastic, nonbleeding internal hemorrhoids.     > Advised the patient to continue sugar-free Metamucil daily, increase her dietary water intake if possible   > Hemorrhoids/Chronic anticoagulation/ rectal bleeding - Will refer to colorectal surgery for possible further evaluation and to discuss possible treatment options  > Follow-up in the GI clinic in 4 months       [facility] follow-up 6/30/2025:  Patient here for complaints of constipation, straining and rectal pressure.  She has been noticing bright red blood on her stool.  She is needing to disimpact " herself.  She stopped using Metamucil due to worsening constipation.  She denies abdominal pain.  No melena.  No family history of CRC or IBD.  She reports decreased appetite and weight loss over the past 1 year.  Her weight in February was 252, today is 225.  She has not started Mounjaro, yet.  No GERD or dysphagia.  No smoking, alcohol or NSAIDs.    Review of Systems   All other systems reviewed and are negative.      Objective   Physical Exam  Vitals reviewed.   Constitutional:       General: She is awake. She is not in acute distress.     Appearance: Normal appearance. She is obese. She is not ill-appearing, toxic-appearing or diaphoretic.   HENT:      Head: Normocephalic and atraumatic.   Eyes:      General: No scleral icterus.  Cardiovascular:      Rate and Rhythm: Normal rate and regular rhythm.      Heart sounds: Normal heart sounds. No murmur heard.  Pulmonary:      Effort: Pulmonary effort is normal. No respiratory distress.      Breath sounds: Normal breath sounds.   Abdominal:      General: Abdomen is protuberant. Bowel sounds are normal.      Palpations: Abdomen is soft. There is no hepatomegaly.      Tenderness: There is no abdominal tenderness. There is no guarding or rebound.   Musculoskeletal:         General: Normal range of motion.      Cervical back: Normal range of motion.      Right lower leg: No edema.      Left lower leg: No edema.   Skin:     General: Skin is warm and dry.      Coloration: Skin is not jaundiced or pale.   Neurological:      General: No focal deficit present.      Mental Status: She is alert and oriented to person, place, and time.      Motor: No weakness.      Gait: Gait normal.   Psychiatric:         Mood and Affect: Mood normal.         Behavior: Behavior is cooperative.         Thought Content: Thought content normal.         Judgment: Judgment normal.         Assessment/Plan   Diagnoses and all orders for this visit:  Internal hemorrhoids  -     hydrocortisone  (Anusol-HC) 2.5 % rectal cream; Insert into the rectum 2 times a day.  Constipation, unspecified constipation type  Straining during bowel movements  85-year-old female here with worsening constipation and straining.  She does have history of internal and external hemorrhoids, now with rectal bleeding and pressure.  Her last colonoscopy was in 2018, detailed in the HPI.  She has concern for weight loss and decreased appetite.  I did offer her endoscopy, but she would like to proceed conservatively at this time.  Will have the patient start a daily bowel regimen, precise instruction provided to her.  She will follow-up with me in 6 to 8 weeks to assess response.         Shayy Justice, MARYLIN-CNP 06/30/25 2:51 PM

## 2025-07-02 DIAGNOSIS — I48.91 ATRIAL FIBRILLATION, UNSPECIFIED TYPE (MULTI): ICD-10-CM

## 2025-07-03 RX ORDER — METOPROLOL SUCCINATE 50 MG/1
75 TABLET, EXTENDED RELEASE ORAL DAILY
Qty: 135 TABLET | Refills: 3 | Status: SHIPPED | OUTPATIENT
Start: 2025-07-03

## 2025-07-06 LAB
ALBUMIN SERPL-MCNC: 4 G/DL (ref 3.6–5.1)
BNP SERPL-MCNC: NORMAL PG/ML
BUN SERPL-MCNC: 40 MG/DL (ref 7–25)
BUN/CREAT SERPL: 21 (CALC) (ref 6–22)
CALCIUM SERPL-MCNC: 9.9 MG/DL (ref 8.6–10.4)
CHLORIDE SERPL-SCNC: 99 MMOL/L (ref 98–110)
CO2 SERPL-SCNC: 28 MMOL/L (ref 20–32)
CREAT SERPL-MCNC: 1.88 MG/DL (ref 0.6–0.95)
EGFRCR SERPLBLD CKD-EPI 2021: 26 ML/MIN/1.73M2
GLUCOSE SERPL-MCNC: 140 MG/DL (ref 65–99)
PHOSPHATE SERPL-MCNC: 4.1 MG/DL (ref 2.1–4.3)
POTASSIUM SERPL-SCNC: 4.8 MMOL/L (ref 3.5–5.3)
SODIUM SERPL-SCNC: 136 MMOL/L (ref 135–146)

## 2025-07-07 LAB
ALBUMIN SERPL-MCNC: 4 G/DL (ref 3.6–5.1)
BNP SERPL-MCNC: 426 PG/ML
BUN SERPL-MCNC: 40 MG/DL (ref 7–25)
BUN/CREAT SERPL: 21 (CALC) (ref 6–22)
CALCIUM SERPL-MCNC: 9.9 MG/DL (ref 8.6–10.4)
CHLORIDE SERPL-SCNC: 99 MMOL/L (ref 98–110)
CO2 SERPL-SCNC: 28 MMOL/L (ref 20–32)
CREAT SERPL-MCNC: 1.88 MG/DL (ref 0.6–0.95)
EGFRCR SERPLBLD CKD-EPI 2021: 26 ML/MIN/1.73M2
GLUCOSE SERPL-MCNC: 140 MG/DL (ref 65–99)
PHOSPHATE SERPL-MCNC: 4.1 MG/DL (ref 2.1–4.3)
POTASSIUM SERPL-SCNC: 4.8 MMOL/L (ref 3.5–5.3)
SODIUM SERPL-SCNC: 136 MMOL/L (ref 135–146)

## 2025-07-08 ENCOUNTER — OFFICE VISIT (OUTPATIENT)
Dept: CARDIOLOGY | Facility: HOSPITAL | Age: 85
End: 2025-07-08
Payer: MEDICARE

## 2025-07-08 VITALS
HEIGHT: 68 IN | SYSTOLIC BLOOD PRESSURE: 117 MMHG | WEIGHT: 233.4 LBS | BODY MASS INDEX: 35.37 KG/M2 | HEART RATE: 85 BPM | OXYGEN SATURATION: 96 % | DIASTOLIC BLOOD PRESSURE: 74 MMHG

## 2025-07-08 DIAGNOSIS — I50.32 CHRONIC HEART FAILURE WITH PRESERVED EJECTION FRACTION: Primary | ICD-10-CM

## 2025-07-08 PROCEDURE — 99212 OFFICE O/P EST SF 10 MIN: CPT

## 2025-07-08 ASSESSMENT — ENCOUNTER SYMPTOMS
LOSS OF SENSATION IN FEET: 0
OCCASIONAL FEELINGS OF UNSTEADINESS: 0
DEPRESSION: 0

## 2025-07-08 ASSESSMENT — PAIN SCALES - GENERAL: PAINLEVEL_OUTOF10: 0-NO PAIN

## 2025-07-08 NOTE — PROGRESS NOTES
Chief Complaint:   Follow-up     History Of Present Illness:    Yaneli Perry is a 85 y.o. female here for follow up  she has a PMH significant for HFpEF, COPD , HTN , ANGI on CPAP , chronic Afib ( followed by Dr De La Vega )  along with venous insufficiency ( followed by Dr Rios ) .Underwent a right and left heart catheterization in August 2024 which did not show any significant CAD and showed normal filling pressures and normal CI .  Last time she was seen , she was complaining of dizziness /HR had increased and worsening of her renal function - appeared to be volume depleted hence we reduced her diuretic regimen and repeated a renal function , she returns today for a follow up visit   Reports doing well , denies having any chest pain/pressure , no palpitations , feels overall improved compared to the last time she was seen and reports increased energy   No dizziness , leg edema is stable and is at baseline for her   No SOB , no orthopnea and no PND        Hospitalizations: Denies     Last Recorded Vitals:  Vitals:    07/08/25 0956   BP: 117/74   Pulse: 85   SpO2: 96%         Past Medical History:  She has a past medical history of Arthritis, Asthma, CHF (congestive heart failure), Chronic kidney disease, unspecified (07/21/2013), Chronic obstructive pulmonary disease, unspecified (04/16/2018), Chronic obstructive pulmonary disease, unspecified (06/18/2018), Chronic venous hypertension (idiopathic) with other complications of unspecified lower extremity (04/16/2018), Chronic venous hypertension (idiopathic) with other complications of unspecified lower extremity (06/18/2018), Diabetes mellitus (Multi), Diverticulosis of intestine, part unspecified, without perforation or abscess without bleeding (07/21/2013), Edema, unspecified (04/16/2018), Edema, unspecified (06/18/2018), Essential (primary) hypertension (12/17/2022), Essential (primary) hypertension (07/21/2013), Gastro-esophageal reflux disease without  esophagitis (07/21/2013), Gastrointestinal hemorrhage, unspecified (07/21/2013), Hyperlipidemia, unspecified (07/21/2013), Hypothyroidism, unspecified (04/16/2018), Hypothyroidism, unspecified (06/18/2018), Malignant neoplasm of thyroid gland (Multi) (12/16/2022), Obesity, Obstructive sleep apnea (adult) (pediatric) (04/16/2018), Obstructive sleep apnea (adult) (pediatric) (06/18/2018), Other disorders of electrolyte and fluid balance, not elsewhere classified (04/16/2018), Other disorders of electrolyte and fluid balance, not elsewhere classified (06/18/2018), Other fatigue (04/16/2018), Other fatigue (06/18/2018), Other forms of dyspnea (04/16/2018), Other forms of dyspnea (06/18/2018), Pain in unspecified knee (06/18/2018), Personal history of other specified conditions (04/10/2017), Pneumonia, Unspecified abdominal hernia without obstruction or gangrene, Unspecified atrial fibrillation (Multi) (12/16/2022), Unspecified diastolic (congestive) heart failure (10/18/2022), Unspecified osteoarthritis, unspecified site (04/16/2018), and Unspecified osteoarthritis, unspecified site (06/18/2018).    Past Surgical History:  She has a past surgical history that includes Incisional breast biopsy (09/25/2013); Total thyroidectomy (09/25/2013); Esophagogastroduodenoscopy (08/28/2014); Colonoscopy (08/28/2014); Cataract extraction (03/12/2018); CT angio coronary art with heartflow if score >30% (08/15/2017); Joint replacement; Cardiac catheterization (N/A, 7/26/2024); and Cardiac catheterization (N/A, 7/26/2024).      Social History:  She reports that she quit smoking about 60 years ago. Her smoking use included cigarettes. She started smoking about 68 years ago. She has a 5 pack-year smoking history. She has never used smokeless tobacco. She reports current alcohol use of about 2.0 standard drinks of alcohol per week. She reports that she does not currently use drugs.    Family History:  Family History[1]      Allergies:  Ceclor [cefaclor], Codeine, Lisinopril, Losartan, Quinidine, Allopurinol, and Erythromycin    Outpatient Medications:  Current Outpatient Medications   Medication Instructions    acetaminophen (TYLENOL) 1,000 mg, Every 6 hours PRN    atorvastatin (LIPITOR) 10 mg, oral, Daily    blood sugar diagnostic (OneTouch Verio test strips) strip 1 strip, subcutaneous, Daily RT    celecoxib (CELEBREX) 200 mg, oral, Daily    cholecalciferol (VITAMIN D-3) 50 mcg, Daily    citalopram (CELEXA) 20 mg, oral, Daily    cyanocobalamin (VITAMIN B-12) 500 mcg, Daily    doxycycline (ADOXA) 100 mg, 2 times daily    febuxostat (ULORIC) 80 mg, oral, Daily    fluticasone (Flonase) 50 mcg/actuation nasal spray 2 sprays, Each Nostril, Daily, Shake gently. Before first use, prime pump. After use, clean tip and replace cap.    hydrALAZINE (APRESOLINE) 25 mg, oral, 2 times daily    hydrocortisone (Anusol-HC) 2.5 % rectal cream rectal, 2 times daily    leflunomide (ARAVA) 10 mg, oral, Daily    levothyroxine (Synthroid, Levoxyl) 150 mcg tablet TAKE 1 TABLET BY MOUTH DAILY FOR 6 DAYS THEN ONE-HALF TABLET BY  MOUTH ON 7TH DAY    metFORMIN XR (GLUCOPHAGE-XR) 1,500 mg, oral, Daily    metoprolol succinate XL (TOPROL-XL) 75 mg, oral, Daily, Take 1 and 1/2 (one and one-half) tablets by mouth daily.    Mounjaro 5 mg, subcutaneous, Weekly    predniSONE (Deltasone) 10 mg tablet TAKE 3 TABLETS BY MOUTH ONCE  DAILY FOR 5 DAYS    psyllium (Metamucil) 3.4 gram packet 1 packet, 2 times daily    spironolactone (ALDACTONE) 12.5 mg, oral, Daily    torsemide (DEMADEX) 20 mg, oral, Every other day    verapamil SR (CALAN-SR) 240 mg, oral, Daily    warfarin (Coumadin) 5 mg tablet TAKE 1 TABLET BY MOUTH MONDAY  THROUGH FRIDAY AND ONE-HALF  TABLET BY MOUTH ON SATURDAYS       Physical Exam:  GEN: NAD , AOX3  HEENT : JVP at the clavicle   Heart : regular rhythm , normal S1 and S2 , no murmurs   Lungs : clear , resonant , normal air entry bilaterally   Abdomen :  soft , non tender   Ext: warm , well perfused , trace edema   Neuro : grossly intact       Last Labs:  CBC -  Lab Results   Component Value Date    WBC 6.9 05/12/2025    HGB 13.4 05/12/2025    HCT 42.0 05/12/2025    MCV 95.9 05/12/2025     05/12/2025       CMP -  Lab Results   Component Value Date    CALCIUM 9.9 07/05/2025    PHOS 4.1 07/05/2025    PROT 7.0 05/12/2025    ALBUMIN 4.0 07/05/2025    AST 12 05/12/2025    ALT 10 05/12/2025    ALKPHOS 66 05/12/2025    BILITOT 0.8 05/12/2025       LIPID PANEL -   Lab Results   Component Value Date    CHOL 146 02/05/2025    TRIG 130 02/05/2025    HDL 44 (L) 02/05/2025    CHHDL 3.3 02/05/2025    LDLF 65 05/30/2023    VLDL 23 05/30/2023    NHDL 102 02/05/2025       RENAL FUNCTION PANEL -   Lab Results   Component Value Date    GLUCOSE 140 (H) 07/05/2025     07/05/2025    K 4.8 07/05/2025    CL 99 07/05/2025    CO2 28 07/05/2025    ANIONGAP 9 05/12/2025    BUN 40 (H) 07/05/2025    CREATININE 1.88 (H) 07/05/2025    CALCIUM 9.9 07/05/2025    PHOS 4.1 07/05/2025    ALBUMIN 4.0 07/05/2025        Lab Results   Component Value Date     (H) 07/05/2025    HGBA1C 6.2 05/15/2025     Most recent CV tests   July 2024 ( LHC and RHC )    Diffuse mild calcific disease.   2. Mid LAD lesion that angiographically appears to be 50-60% stenotic, FFR negative.   3. RHC with normal filling pressures with normal CO and CI.     Echocardiogram ( 04/2024)    1. Left ventricular systolic function is normal with a 65% estimated ejection fraction.   2. There is low normal right ventricular systolic function.   3. The left atrium is severely dilated.   4. The right atrium is moderately to severely dilated.   5. Slightly elevated RVSP.   6. Compared with study from 11/8/2022, no significant change.   7. The patient is in atrial fibrillation which may influence the estimate of left ventricular function and transvalvular flows.       Assessment/Plan     Promise Perry is a 85 y.o. female  here for follow up  she has a PMH significant for HFpEF, COPD , HTN , ANGI on CPAP , chronic Afib ( followed by Dr De La Vega )  along with venous insufficiency ( followed by Dr Rios ) ,was seen in May and her diuretics reduced in the setting of symptoms of hypovolemia and ELLIE   Is here for a follow up visit   Improved volume status and symptoms      #HFpEF   Continue with the reduced dose of torsemide 20 mg Twice a week ( on Monday and Friday) and spironolactone twice a week as well      #Atrial fibrillation   Followed by Dr De La Vega , on verapamil 240 mg daily and Toprol XL 75 mg daily   On coumadin for anticoagulation      #Venous insufficiency   Follows with Dr Rios      #CAD   Has Mid LAD 50-60% stenosis ,negative FFR   On simvastatin   LDL at goal     Repeat an echocardiogram and follow up in 6 months   Will also repeat a renal function panel then          [1]   Family History  Problem Relation Name Age of Onset    Hypertension Mother Esther Perry     Heart disease Mother Esther Perry     Cancer Father Pankaj Orlando

## 2025-07-08 NOTE — PATIENT INSTRUCTIONS
Thank you for coming to see us today! To reach Dr. Nielsen's office please call [phone].  Fax [phone]. Call [phone] to schedule an appointment. You may also contact the HF RNs at [email or web address]  (Please include your name and date of birth)  For MEDICATION REFILLS, please call [phone] option [phone] then option 1.    No medication changes today.  Please obtain an Echocardiogram. Call [phone] to schedule in 4-5 months.  Please get blood work done (RFP BNP) just before your next visit with Dr. Michael Yun. You can go to any  or [facility] lab. They do accept walk-ins, but it is recommended you schedule an appointment ([phone]).  Follow up with Dr. Michael Yun in 6 months.

## 2025-08-08 ENCOUNTER — TELEPHONE (OUTPATIENT)
Dept: GASTROENTEROLOGY | Facility: CLINIC | Age: 85
End: 2025-08-08
Payer: MEDICARE

## 2025-08-08 NOTE — TELEPHONE ENCOUNTER
Spoke with patient via telephone.  Reminded them of their upcoming appointment with us scheduled for Monday, 11 August at 2:30pm with Shayy Justice CNP.  Patient plans on attending this appointment.

## 2025-08-09 LAB
ALBUMIN SERPL-MCNC: 4.1 G/DL (ref 3.6–5.1)
ALP SERPL-CCNC: 83 U/L (ref 37–153)
ALT SERPL-CCNC: 15 U/L (ref 6–29)
ANION GAP SERPL CALCULATED.4IONS-SCNC: 7 MMOL/L (CALC) (ref 7–17)
AST SERPL-CCNC: 13 U/L (ref 10–35)
BILIRUB SERPL-MCNC: 0.8 MG/DL (ref 0.2–1.2)
BUN SERPL-MCNC: 35 MG/DL (ref 7–25)
CALCIUM SERPL-MCNC: 9.9 MG/DL (ref 8.6–10.4)
CHLORIDE SERPL-SCNC: 97 MMOL/L (ref 98–110)
CO2 SERPL-SCNC: 30 MMOL/L (ref 20–32)
CREAT SERPL-MCNC: 1.76 MG/DL (ref 0.6–0.95)
EGFRCR SERPLBLD CKD-EPI 2021: 28 ML/MIN/1.73M2
ERYTHROCYTE [DISTWIDTH] IN BLOOD BY AUTOMATED COUNT: 12 % (ref 11–15)
GLUCOSE SERPL-MCNC: 352 MG/DL (ref 65–99)
HCT VFR BLD AUTO: 41.9 % (ref 35–45)
HGB BLD-MCNC: 13.4 G/DL (ref 11.7–15.5)
INR PPP: 2.2
MCH RBC QN AUTO: 30.9 PG (ref 27–33)
MCHC RBC AUTO-ENTMCNC: 32 G/DL (ref 32–36)
MCV RBC AUTO: 96.8 FL (ref 80–100)
PLATELET # BLD AUTO: 217 THOUSAND/UL (ref 140–400)
PMV BLD REES-ECKER: 9.3 FL (ref 7.5–12.5)
POTASSIUM SERPL-SCNC: 5.1 MMOL/L (ref 3.5–5.3)
PROT SERPL-MCNC: 6.5 G/DL (ref 6.1–8.1)
PROTHROMBIN TIME: 21.6 SEC (ref 9–11.5)
RBC # BLD AUTO: 4.33 MILLION/UL (ref 3.8–5.1)
SODIUM SERPL-SCNC: 134 MMOL/L (ref 135–146)
WBC # BLD AUTO: 7.3 THOUSAND/UL (ref 3.8–10.8)

## 2025-08-11 ENCOUNTER — OFFICE VISIT (OUTPATIENT)
Dept: GASTROENTEROLOGY | Facility: CLINIC | Age: 85
End: 2025-08-11
Payer: MEDICARE

## 2025-08-11 ENCOUNTER — APPOINTMENT (OUTPATIENT)
Dept: PRIMARY CARE | Facility: CLINIC | Age: 85
End: 2025-08-11
Payer: MEDICARE

## 2025-08-11 VITALS
RESPIRATION RATE: 16 BRPM | DIASTOLIC BLOOD PRESSURE: 84 MMHG | HEART RATE: 96 BPM | HEIGHT: 68 IN | WEIGHT: 233 LBS | OXYGEN SATURATION: 92 % | SYSTOLIC BLOOD PRESSURE: 133 MMHG | BODY MASS INDEX: 35.31 KG/M2

## 2025-08-11 VITALS
DIASTOLIC BLOOD PRESSURE: 90 MMHG | HEIGHT: 68 IN | BODY MASS INDEX: 35.31 KG/M2 | SYSTOLIC BLOOD PRESSURE: 140 MMHG | WEIGHT: 233 LBS | HEART RATE: 84 BPM | RESPIRATION RATE: 14 BRPM | OXYGEN SATURATION: 96 %

## 2025-08-11 DIAGNOSIS — K59.00 CONSTIPATION, UNSPECIFIED CONSTIPATION TYPE: Primary | ICD-10-CM

## 2025-08-11 DIAGNOSIS — I48.91 ATRIAL FIBRILLATION, UNSPECIFIED TYPE (MULTI): ICD-10-CM

## 2025-08-11 DIAGNOSIS — N18.4 STAGE 4 CHRONIC KIDNEY DISEASE (MULTI): Primary | ICD-10-CM

## 2025-08-11 DIAGNOSIS — I50.30 HEART FAILURE WITH PRESERVED EJECTION FRACTION, UNSPECIFIED HF CHRONICITY: ICD-10-CM

## 2025-08-11 PROBLEM — E78.5 HYPERLIPIDEMIA: Status: ACTIVE | Noted: 2023-08-18

## 2025-08-11 PROBLEM — K92.2 GASTROINTESTINAL HEMORRHAGE: Status: ACTIVE | Noted: 2025-08-11

## 2025-08-11 PROCEDURE — 3080F DIAST BP >= 90 MM HG: CPT | Performed by: INTERNAL MEDICINE

## 2025-08-11 PROCEDURE — 99213 OFFICE O/P EST LOW 20 MIN: CPT | Performed by: NURSE PRACTITIONER

## 2025-08-11 PROCEDURE — 3075F SYST BP GE 130 - 139MM HG: CPT | Performed by: NURSE PRACTITIONER

## 2025-08-11 PROCEDURE — 3079F DIAST BP 80-89 MM HG: CPT | Performed by: NURSE PRACTITIONER

## 2025-08-11 PROCEDURE — 3077F SYST BP >= 140 MM HG: CPT | Performed by: INTERNAL MEDICINE

## 2025-08-11 PROCEDURE — 1159F MED LIST DOCD IN RCRD: CPT | Performed by: NURSE PRACTITIONER

## 2025-08-11 PROCEDURE — 99213 OFFICE O/P EST LOW 20 MIN: CPT | Performed by: INTERNAL MEDICINE

## 2025-08-11 RX ORDER — WARFARIN SODIUM 5 MG/1
TABLET ORAL
Qty: 71 TABLET | Refills: 3 | Status: SHIPPED | OUTPATIENT
Start: 2025-08-11

## 2025-08-11 RX ORDER — POLYETHYLENE GLYCOL 3350 17 G/17G
17 POWDER, FOR SOLUTION ORAL DAILY
COMMUNITY

## 2025-08-11 RX ORDER — VERAPAMIL HCL 240 MG
240 TABLET, EXTENDED RELEASE ORAL NIGHTLY
Qty: 14 TABLET | Refills: 2 | Status: SHIPPED | OUTPATIENT
Start: 2025-08-11 | End: 2026-02-07

## 2025-08-12 PROBLEM — N18.32 CHRONIC KIDNEY DISEASE, STAGE 3B (MULTI): Status: RESOLVED | Noted: 2025-02-16 | Resolved: 2025-08-12

## 2025-08-12 PROBLEM — N18.4 STAGE 4 CHRONIC KIDNEY DISEASE (MULTI): Status: ACTIVE | Noted: 2025-08-12

## 2025-08-12 ASSESSMENT — ENCOUNTER SYMPTOMS
CHILLS: 0
FEVER: 0
MYALGIAS: 0
JOINT SWELLING: 0
VOMITING: 0
CONSTIPATION: 0
DIAPHORESIS: 0
DIARRHEA: 0
COUGH: 0
NAUSEA: 0

## 2025-08-15 DIAGNOSIS — N18.32 CHRONIC KIDNEY DISEASE, STAGE 3B (MULTI): ICD-10-CM

## 2025-09-10 ENCOUNTER — APPOINTMENT (OUTPATIENT)
Dept: CARDIOLOGY | Facility: CLINIC | Age: 85
End: 2025-09-10
Payer: MEDICARE

## 2025-09-19 ENCOUNTER — APPOINTMENT (OUTPATIENT)
Facility: CLINIC | Age: 85
End: 2025-09-19
Payer: MEDICARE

## 2025-11-12 ENCOUNTER — APPOINTMENT (OUTPATIENT)
Dept: PRIMARY CARE | Facility: CLINIC | Age: 85
End: 2025-11-12
Payer: MEDICARE

## 2025-11-18 ENCOUNTER — APPOINTMENT (OUTPATIENT)
Dept: PRIMARY CARE | Facility: CLINIC | Age: 85
End: 2025-11-18
Payer: MEDICARE

## 2025-12-19 ENCOUNTER — APPOINTMENT (OUTPATIENT)
Facility: CLINIC | Age: 85
End: 2025-12-19
Payer: MEDICARE

## 2026-01-19 ENCOUNTER — APPOINTMENT (OUTPATIENT)
Dept: OPHTHALMOLOGY | Facility: CLINIC | Age: 86
End: 2026-01-19
Payer: MEDICARE

## (undated) DEVICE — CATHETER, DIAGNOSTIC, 4 FR-JL 4

## (undated) DEVICE — CATHETER, GUIDING LAUNCHER 5FR EBU3.5 LEFT

## (undated) DEVICE — SHEATH, PINNACLE, 10 CM,  6FR INTRODUCER, 6FR DIA, 2.5 CM DIALATOR

## (undated) DEVICE — VALVE, CONTROL BLEEDBACK

## (undated) DEVICE — CATHETER, WEDGE PRESSURE, BALLOON, DOUBLE LUMEN, 5 FR, 110 CM

## (undated) DEVICE — CATHETER, THERMODILUTION, SWAN GANZ, HI-SHORE, COATED, W/GUIDEWIRE, 7 FR, 110 CM

## (undated) DEVICE — GUIDEWIRE, PRESSURE WIRE X , 175CM WIRELESS, AGILE TIP

## (undated) DEVICE — SHEATH, GLIDESHEATH, SLENDER, 6FR 10CM

## (undated) DEVICE — CATHETER, DIAGNOSTIC, 4 FR-JR 4

## (undated) DEVICE — DEVICE, CLOSURE, PERCLOSE, PROSTYLE

## (undated) DEVICE — SHEATH, BRITE TIP 6 X 23

## (undated) DEVICE — PAD, ELECTRODE DEFIB PADPRO ADULT STRL W/ADAPTER

## (undated) DEVICE — ACCESS KIT, S-MAK MINI, 4FR 10CM 0.018IN 40CM, NT/PT, ECHO ENHANCE NEEDLE

## (undated) DEVICE — CATHETER, ANGIO, IMPULSE, FL3.5, 5 FR X 100 CM

## (undated) DEVICE — GUIDEWIRE, INQUIRE, J TIP, .035 X 210CM, FIXED CORE, DIAGNOSTIC

## (undated) DEVICE — CATHETER, GUIDING LAUNCHER 6FR EBU 3.75 LEFT

## (undated) DEVICE — GUIDE WIRE, 035/190CM, HI-TORGUE SUPRA CORE

## (undated) DEVICE — SHEATH, PINNACLE, 10 CM,  7FR INTRODUCER, 7FR DIA, 2.5 CM DIALATOR

## (undated) DEVICE — TR BAND, RADIAL COMPRESSION, LONG, 29CM